# Patient Record
Sex: MALE | Race: BLACK OR AFRICAN AMERICAN | Employment: PART TIME | URBAN - METROPOLITAN AREA
[De-identification: names, ages, dates, MRNs, and addresses within clinical notes are randomized per-mention and may not be internally consistent; named-entity substitution may affect disease eponyms.]

---

## 2023-06-26 ENCOUNTER — OFFICE VISIT (OUTPATIENT)
Dept: FAMILY MEDICINE CLINIC | Age: 68
End: 2023-06-26
Payer: MEDICARE

## 2023-06-26 VITALS
DIASTOLIC BLOOD PRESSURE: 118 MMHG | TEMPERATURE: 97.6 F | WEIGHT: 183 LBS | SYSTOLIC BLOOD PRESSURE: 180 MMHG | HEIGHT: 70 IN | OXYGEN SATURATION: 97 % | BODY MASS INDEX: 26.2 KG/M2 | HEART RATE: 86 BPM

## 2023-06-26 DIAGNOSIS — R79.9 ABNORMAL FINDING OF BLOOD CHEMISTRY, UNSPECIFIED: ICD-10-CM

## 2023-06-26 DIAGNOSIS — N40.1 BENIGN PROSTATIC HYPERPLASIA WITH MIXED URINARY INCONTINENCE: Primary | ICD-10-CM

## 2023-06-26 DIAGNOSIS — Z12.11 COLON CANCER SCREENING: ICD-10-CM

## 2023-06-26 DIAGNOSIS — R60.0 BILATERAL LEG EDEMA: ICD-10-CM

## 2023-06-26 DIAGNOSIS — Z12.5 SCREENING PSA (PROSTATE SPECIFIC ANTIGEN): ICD-10-CM

## 2023-06-26 DIAGNOSIS — R03.0 ELEVATED BLOOD-PRESSURE READING, WITHOUT DIAGNOSIS OF HYPERTENSION: ICD-10-CM

## 2023-06-26 DIAGNOSIS — R91.1 LUNG NODULE SEEN ON IMAGING STUDY: Primary | ICD-10-CM

## 2023-06-26 DIAGNOSIS — D51.3 OTHER DIETARY VITAMIN B12 DEFICIENCY ANEMIA: Primary | ICD-10-CM

## 2023-06-26 DIAGNOSIS — D64.89 ANEMIA DUE TO OTHER CAUSE, NOT CLASSIFIED: ICD-10-CM

## 2023-06-26 DIAGNOSIS — R03.0 ELEVATED BLOOD-PRESSURE READING, WITHOUT DIAGNOSIS OF HYPERTENSION: Primary | ICD-10-CM

## 2023-06-26 DIAGNOSIS — N39.46 BENIGN PROSTATIC HYPERPLASIA WITH MIXED URINARY INCONTINENCE: Primary | ICD-10-CM

## 2023-06-26 DIAGNOSIS — N39.3 STRESS INCONTINENCE OF URINE: ICD-10-CM

## 2023-06-26 DIAGNOSIS — Z91.81 AT HIGH RISK FOR FALLS: ICD-10-CM

## 2023-06-26 LAB
ALBUMIN SERPL-MCNC: 4.4 G/DL (ref 3.5–4.6)
ALP SERPL-CCNC: 94 U/L (ref 35–104)
ALT SERPL-CCNC: 14 U/L (ref 0–41)
ANION GAP SERPL CALCULATED.3IONS-SCNC: 13 MEQ/L (ref 9–15)
AST SERPL-CCNC: 20 U/L (ref 0–40)
BACTERIA URNS QL MICRO: NEGATIVE /HPF
BASOPHILS # BLD: 0 K/UL (ref 0–0.2)
BASOPHILS NFR BLD: 0.3 %
BILIRUB SERPL-MCNC: 1 MG/DL (ref 0.2–0.7)
BILIRUB UR QL STRIP: NEGATIVE
BUN SERPL-MCNC: 18 MG/DL (ref 8–23)
CALCIUM SERPL-MCNC: 9.5 MG/DL (ref 8.5–9.9)
CHLORIDE SERPL-SCNC: 103 MEQ/L (ref 95–107)
CHOLEST SERPL-MCNC: 186 MG/DL (ref 0–199)
CLARITY UR: ABNORMAL
CO2 SERPL-SCNC: 27 MEQ/L (ref 20–31)
COLOR UR: YELLOW
CREAT SERPL-MCNC: 1.09 MG/DL (ref 0.7–1.2)
EOSINOPHIL # BLD: 0.1 K/UL (ref 0–0.7)
EOSINOPHIL NFR BLD: 1.3 %
EPI CELLS #/AREA URNS AUTO: NORMAL /HPF (ref 0–5)
ERYTHROCYTE [DISTWIDTH] IN BLOOD BY AUTOMATED COUNT: 13.1 % (ref 11.5–14.5)
GLOBULIN SER CALC-MCNC: 3.2 G/DL (ref 2.3–3.5)
GLUCOSE SERPL-MCNC: 90 MG/DL (ref 70–99)
GLUCOSE UR STRIP-MCNC: NEGATIVE MG/DL
HCT VFR BLD AUTO: 41.8 % (ref 42–52)
HDLC SERPL-MCNC: 64 MG/DL (ref 40–59)
HGB BLD-MCNC: 13.9 G/DL (ref 14–18)
HGB UR QL STRIP: NEGATIVE
HYALINE CASTS #/AREA URNS AUTO: NORMAL /HPF (ref 0–5)
KETONES UR STRIP-MCNC: NEGATIVE MG/DL
LDL CHOLESTEROL CALCULATED: 110 MG/DL (ref 0–129)
LEUKOCYTE ESTERASE UR QL STRIP: NEGATIVE
LYMPHOCYTES # BLD: 1 K/UL (ref 1–4.8)
LYMPHOCYTES NFR BLD: 19.4 %
MCH RBC QN AUTO: 31.5 PG (ref 27–31.3)
MCHC RBC AUTO-ENTMCNC: 33.2 % (ref 33–37)
MCV RBC AUTO: 94.6 FL (ref 79–92.2)
MONOCYTES # BLD: 0.4 K/UL (ref 0.2–0.8)
MONOCYTES NFR BLD: 7.4 %
NEUTROPHILS # BLD: 3.8 K/UL (ref 1.4–6.5)
NEUTS SEG NFR BLD: 71.6 %
NITRITE UR QL STRIP: NEGATIVE
PH UR STRIP: 8 [PH] (ref 5–9)
PLATELET # BLD AUTO: 198 K/UL (ref 130–400)
POTASSIUM SERPL-SCNC: 4.1 MEQ/L (ref 3.4–4.9)
PROT SERPL-MCNC: 7.6 G/DL (ref 6.3–8)
PROT UR STRIP-MCNC: 30 MG/DL
PSA SERPL-MCNC: 17.25 NG/ML (ref 0–4)
RBC # BLD AUTO: 4.41 M/UL (ref 4.7–6.1)
RBC #/AREA URNS AUTO: NORMAL /HPF (ref 0–5)
SODIUM SERPL-SCNC: 143 MEQ/L (ref 135–144)
SP GR UR STRIP: 1.02 (ref 1–1.03)
TRIGLYCERIDE, FASTING: 61 MG/DL (ref 0–150)
URINE REFLEX TO CULTURE: ABNORMAL
UROBILINOGEN UR STRIP-ACNC: 1 E.U./DL
WBC # BLD AUTO: 5.3 K/UL (ref 4.8–10.8)
WBC #/AREA URNS AUTO: NORMAL /HPF (ref 0–5)

## 2023-06-26 PROCEDURE — G8419 CALC BMI OUT NRM PARAM NOF/U: HCPCS | Performed by: PHYSICIAN ASSISTANT

## 2023-06-26 PROCEDURE — 1036F TOBACCO NON-USER: CPT | Performed by: PHYSICIAN ASSISTANT

## 2023-06-26 PROCEDURE — 3017F COLORECTAL CA SCREEN DOC REV: CPT | Performed by: PHYSICIAN ASSISTANT

## 2023-06-26 PROCEDURE — 1123F ACP DISCUSS/DSCN MKR DOCD: CPT | Performed by: PHYSICIAN ASSISTANT

## 2023-06-26 PROCEDURE — G8427 DOCREV CUR MEDS BY ELIG CLIN: HCPCS | Performed by: PHYSICIAN ASSISTANT

## 2023-06-26 PROCEDURE — 99203 OFFICE O/P NEW LOW 30 MIN: CPT | Performed by: PHYSICIAN ASSISTANT

## 2023-06-26 RX ORDER — FUROSEMIDE 20 MG/1
20 TABLET ORAL DAILY
Qty: 10 TABLET | Refills: 0 | Status: SHIPPED | OUTPATIENT
Start: 2023-06-26

## 2023-06-26 RX ORDER — POTASSIUM CHLORIDE 750 MG/1
10 TABLET, EXTENDED RELEASE ORAL DAILY
Qty: 10 TABLET | Refills: 0 | Status: SHIPPED | OUTPATIENT
Start: 2023-06-26

## 2023-06-26 SDOH — ECONOMIC STABILITY: FOOD INSECURITY: WITHIN THE PAST 12 MONTHS, THE FOOD YOU BOUGHT JUST DIDN'T LAST AND YOU DIDN'T HAVE MONEY TO GET MORE.: NEVER TRUE

## 2023-06-26 SDOH — ECONOMIC STABILITY: HOUSING INSECURITY
IN THE LAST 12 MONTHS, WAS THERE A TIME WHEN YOU DID NOT HAVE A STEADY PLACE TO SLEEP OR SLEPT IN A SHELTER (INCLUDING NOW)?: NO

## 2023-06-26 SDOH — ECONOMIC STABILITY: INCOME INSECURITY: HOW HARD IS IT FOR YOU TO PAY FOR THE VERY BASICS LIKE FOOD, HOUSING, MEDICAL CARE, AND HEATING?: NOT HARD AT ALL

## 2023-06-26 SDOH — ECONOMIC STABILITY: FOOD INSECURITY: WITHIN THE PAST 12 MONTHS, YOU WORRIED THAT YOUR FOOD WOULD RUN OUT BEFORE YOU GOT MONEY TO BUY MORE.: NEVER TRUE

## 2023-06-26 ASSESSMENT — PATIENT HEALTH QUESTIONNAIRE - PHQ9
2. FEELING DOWN, DEPRESSED OR HOPELESS: 0
SUM OF ALL RESPONSES TO PHQ QUESTIONS 1-9: 0
SUM OF ALL RESPONSES TO PHQ9 QUESTIONS 1 & 2: 0
SUM OF ALL RESPONSES TO PHQ QUESTIONS 1-9: 0
SUM OF ALL RESPONSES TO PHQ QUESTIONS 1-9: 0
1. LITTLE INTEREST OR PLEASURE IN DOING THINGS: 0
SUM OF ALL RESPONSES TO PHQ QUESTIONS 1-9: 0

## 2023-06-27 ENCOUNTER — PREP FOR PROCEDURE (OUTPATIENT)
Dept: GASTROENTEROLOGY | Age: 68
End: 2023-06-27

## 2023-06-27 PROBLEM — Z12.11 SPECIAL SCREENING FOR MALIGNANT NEOPLASMS, COLON: Status: ACTIVE | Noted: 2023-06-27

## 2023-06-28 DIAGNOSIS — R79.9 ABNORMAL FINDING OF BLOOD CHEMISTRY, UNSPECIFIED: ICD-10-CM

## 2023-06-28 DIAGNOSIS — D64.89 ANEMIA DUE TO OTHER CAUSE, NOT CLASSIFIED: ICD-10-CM

## 2023-06-29 DIAGNOSIS — R35.0 BENIGN PROSTATIC HYPERPLASIA WITH URINARY FREQUENCY: Primary | ICD-10-CM

## 2023-06-29 DIAGNOSIS — N40.1 BENIGN PROSTATIC HYPERPLASIA WITH URINARY FREQUENCY: Primary | ICD-10-CM

## 2023-06-29 LAB
FOLATE: 12.9 NG/ML
IRON SATURATION: 27 % (ref 20–55)
IRON: 85 UG/DL (ref 59–158)
TOTAL IRON BINDING CAPACITY: 319 UG/DL (ref 250–450)
UNSATURATED IRON BINDING CAPACITY: 234 UG/DL (ref 112–347)
VITAMIN B-12: 486 PG/ML (ref 232–1245)

## 2023-06-29 RX ORDER — TERAZOSIN 1 MG/1
1 CAPSULE ORAL NIGHTLY
Qty: 30 CAPSULE | Refills: 3 | Status: SHIPPED | OUTPATIENT
Start: 2023-06-29 | End: 2023-08-23 | Stop reason: SDUPTHER

## 2023-07-06 ENCOUNTER — HOSPITAL ENCOUNTER (OUTPATIENT)
Dept: CT IMAGING | Age: 68
Discharge: HOME | End: 2023-07-08
Payer: MEDICARE

## 2023-07-06 DIAGNOSIS — R91.1 LUNG NODULE SEEN ON IMAGING STUDY: ICD-10-CM

## 2023-07-06 PROCEDURE — 71250 CT THORAX DX C-: CPT

## 2023-07-19 ENCOUNTER — HOSPITAL ENCOUNTER (EMERGENCY)
Age: 68
Discharge: HOME | End: 2023-07-19
Attending: EMERGENCY MEDICINE
Payer: MEDICARE

## 2023-07-19 ENCOUNTER — OFFICE VISIT (OUTPATIENT)
Dept: FAMILY MEDICINE CLINIC | Age: 68
End: 2023-07-19
Payer: MEDICARE

## 2023-07-19 VITALS
SYSTOLIC BLOOD PRESSURE: 200 MMHG | WEIGHT: 177 LBS | HEIGHT: 70 IN | TEMPERATURE: 97.6 F | OXYGEN SATURATION: 96 % | DIASTOLIC BLOOD PRESSURE: 120 MMHG | BODY MASS INDEX: 25.34 KG/M2 | HEART RATE: 94 BPM

## 2023-07-19 VITALS
HEART RATE: 82 BPM | BODY MASS INDEX: 26.22 KG/M2 | TEMPERATURE: 98.4 F | OXYGEN SATURATION: 96 % | RESPIRATION RATE: 20 BRPM | HEIGHT: 69 IN | DIASTOLIC BLOOD PRESSURE: 96 MMHG | SYSTOLIC BLOOD PRESSURE: 170 MMHG | WEIGHT: 177 LBS

## 2023-07-19 DIAGNOSIS — I49.9 IRREGULAR HEART BEAT: ICD-10-CM

## 2023-07-19 DIAGNOSIS — R60.0 BILATERAL LEG EDEMA: ICD-10-CM

## 2023-07-19 DIAGNOSIS — I16.9 HYPERTENSIVE CRISIS, UNSPECIFIED: Primary | ICD-10-CM

## 2023-07-19 DIAGNOSIS — N39.42 BENIGN PROSTATIC HYPERPLASIA WITH URINARY INCONTINENCE WITHOUT SENSORY AWARENESS: ICD-10-CM

## 2023-07-19 DIAGNOSIS — R94.31 ABNORMAL EKG: ICD-10-CM

## 2023-07-19 DIAGNOSIS — N40.1 BENIGN PROSTATIC HYPERPLASIA WITH URINARY INCONTINENCE WITHOUT SENSORY AWARENESS: ICD-10-CM

## 2023-07-19 DIAGNOSIS — I10 PRIMARY HYPERTENSION: ICD-10-CM

## 2023-07-19 DIAGNOSIS — I16.0 HYPERTENSIVE URGENCY: Primary | ICD-10-CM

## 2023-07-19 DIAGNOSIS — Z00.00 WELCOME TO MEDICARE PREVENTIVE VISIT: Primary | ICD-10-CM

## 2023-07-19 LAB
ANION GAP SERPL CALCULATED.3IONS-SCNC: 10 MEQ/L (ref 9–15)
BASOPHILS # BLD: 0 K/UL (ref 0–0.2)
BASOPHILS NFR BLD: 0.3 %
BUN SERPL-MCNC: 20 MG/DL (ref 8–23)
CALCIUM SERPL-MCNC: 9.2 MG/DL (ref 8.5–9.9)
CHLORIDE SERPL-SCNC: 104 MEQ/L (ref 95–107)
CO2 SERPL-SCNC: 28 MEQ/L (ref 20–31)
CREAT SERPL-MCNC: 0.98 MG/DL (ref 0.7–1.2)
EOSINOPHIL # BLD: 0 K/UL (ref 0–0.7)
EOSINOPHIL NFR BLD: 0.5 %
ERYTHROCYTE [DISTWIDTH] IN BLOOD BY AUTOMATED COUNT: 13.3 % (ref 11.5–14.5)
GLUCOSE SERPL-MCNC: 90 MG/DL (ref 70–99)
HCT VFR BLD AUTO: 39.8 % (ref 42–52)
HGB BLD-MCNC: 13.3 G/DL (ref 14–18)
LYMPHOCYTES # BLD: 0.9 K/UL (ref 1–4.8)
LYMPHOCYTES NFR BLD: 15.5 %
MCH RBC QN AUTO: 31.8 PG (ref 27–31.3)
MCHC RBC AUTO-ENTMCNC: 33.5 % (ref 33–37)
MCV RBC AUTO: 95.1 FL (ref 79–92.2)
MONOCYTES # BLD: 0.5 K/UL (ref 0.2–0.8)
MONOCYTES NFR BLD: 7.8 %
NEUTROPHILS # BLD: 4.5 K/UL (ref 1.4–6.5)
NEUTS SEG NFR BLD: 75.9 %
PLATELET # BLD AUTO: 192 K/UL (ref 130–400)
POTASSIUM SERPL-SCNC: 3.8 MEQ/L (ref 3.4–4.9)
RBC # BLD AUTO: 4.19 M/UL (ref 4.7–6.1)
SODIUM SERPL-SCNC: 142 MEQ/L (ref 135–144)
WBC # BLD AUTO: 5.9 K/UL (ref 4.8–10.8)

## 2023-07-19 PROCEDURE — 3080F DIAST BP >= 90 MM HG: CPT | Performed by: PHYSICIAN ASSISTANT

## 2023-07-19 PROCEDURE — 3017F COLORECTAL CA SCREEN DOC REV: CPT | Performed by: PHYSICIAN ASSISTANT

## 2023-07-19 PROCEDURE — 1036F TOBACCO NON-USER: CPT | Performed by: PHYSICIAN ASSISTANT

## 2023-07-19 PROCEDURE — G0402 INITIAL PREVENTIVE EXAM: HCPCS | Performed by: PHYSICIAN ASSISTANT

## 2023-07-19 PROCEDURE — 3077F SYST BP >= 140 MM HG: CPT | Performed by: PHYSICIAN ASSISTANT

## 2023-07-19 PROCEDURE — 99215 OFFICE O/P EST HI 40 MIN: CPT | Performed by: PHYSICIAN ASSISTANT

## 2023-07-19 PROCEDURE — 1123F ACP DISCUSS/DSCN MKR DOCD: CPT | Performed by: PHYSICIAN ASSISTANT

## 2023-07-19 PROCEDURE — 36415 COLL VENOUS BLD VENIPUNCTURE: CPT

## 2023-07-19 PROCEDURE — G8427 DOCREV CUR MEDS BY ELIG CLIN: HCPCS | Performed by: PHYSICIAN ASSISTANT

## 2023-07-19 PROCEDURE — G8419 CALC BMI OUT NRM PARAM NOF/U: HCPCS | Performed by: PHYSICIAN ASSISTANT

## 2023-07-19 PROCEDURE — 80048 BASIC METABOLIC PNL TOTAL CA: CPT

## 2023-07-19 PROCEDURE — 85025 COMPLETE CBC W/AUTO DIFF WBC: CPT

## 2023-07-19 PROCEDURE — 6370000000 HC RX 637 (ALT 250 FOR IP): Performed by: EMERGENCY MEDICINE

## 2023-07-19 PROCEDURE — 93005 ELECTROCARDIOGRAM TRACING: CPT | Performed by: EMERGENCY MEDICINE

## 2023-07-19 PROCEDURE — 99284 EMERGENCY DEPT VISIT MOD MDM: CPT

## 2023-07-19 PROCEDURE — 93000 ELECTROCARDIOGRAM COMPLETE: CPT | Performed by: PHYSICIAN ASSISTANT

## 2023-07-19 RX ORDER — AMLODIPINE BESYLATE 10 MG/1
10 TABLET ORAL DAILY
Qty: 30 TABLET | Refills: 0 | Status: SHIPPED | OUTPATIENT
Start: 2023-07-19

## 2023-07-19 RX ORDER — TERAZOSIN 5 MG/1
5 CAPSULE ORAL NIGHTLY
Qty: 30 CAPSULE | Refills: 3 | Status: CANCELLED | OUTPATIENT
Start: 2023-07-19

## 2023-07-19 RX ORDER — AMLODIPINE BESYLATE 5 MG/1
10 TABLET ORAL ONCE
Status: COMPLETED | OUTPATIENT
Start: 2023-07-19 | End: 2023-07-19

## 2023-07-19 RX ORDER — CLONIDINE HYDROCHLORIDE 0.1 MG/1
0.1 TABLET ORAL ONCE
Status: COMPLETED | OUTPATIENT
Start: 2023-07-19 | End: 2023-07-19

## 2023-07-19 RX ADMIN — AMLODIPINE BESYLATE 10 MG: 5 TABLET ORAL at 12:49

## 2023-07-19 RX ADMIN — CLONIDINE HYDROCHLORIDE 0.1 MG: 0.1 TABLET ORAL at 12:49

## 2023-07-19 ASSESSMENT — ENCOUNTER SYMPTOMS
BACK PAIN: 0
ABDOMINAL PAIN: 0
VOMITING: 0
BLOOD IN STOOL: 0
CHEST TIGHTNESS: 0
DIARRHEA: 0
COUGH: 0
SORE THROAT: 0
SHORTNESS OF BREATH: 0

## 2023-07-19 ASSESSMENT — PATIENT HEALTH QUESTIONNAIRE - PHQ9
SUM OF ALL RESPONSES TO PHQ QUESTIONS 1-9: 0
SUM OF ALL RESPONSES TO PHQ QUESTIONS 1-9: 0
2. FEELING DOWN, DEPRESSED OR HOPELESS: 0
SUM OF ALL RESPONSES TO PHQ QUESTIONS 1-9: 0
SUM OF ALL RESPONSES TO PHQ QUESTIONS 1-9: 0
SUM OF ALL RESPONSES TO PHQ9 QUESTIONS 1 & 2: 0
1. LITTLE INTEREST OR PLEASURE IN DOING THINGS: 0

## 2023-07-19 ASSESSMENT — LIFESTYLE VARIABLES
HOW OFTEN DO YOU HAVE A DRINK CONTAINING ALCOHOL: MONTHLY OR LESS
HOW MANY STANDARD DRINKS CONTAINING ALCOHOL DO YOU HAVE ON A TYPICAL DAY: PATIENT DOES NOT DRINK
HOW MANY STANDARD DRINKS CONTAINING ALCOHOL DO YOU HAVE ON A TYPICAL DAY: 1 OR 2
HOW OFTEN DO YOU HAVE A DRINK CONTAINING ALCOHOL: NEVER

## 2023-07-19 ASSESSMENT — PAIN - FUNCTIONAL ASSESSMENT: PAIN_FUNCTIONAL_ASSESSMENT: NONE - DENIES PAIN

## 2023-07-19 NOTE — ED PROVIDER NOTES
[facility] ED  EMERGENCY DEPARTMENT ENCOUNTER      Pt Name: Julius Scott  MRN: [id number]  [address] [facility] [place] 1955  Date of evaluation: 7/19/2023  Provider: Sara Mckeon MD    CHIEF COMPLAINT       Chief Complaint   Patient presents with    Hypertension         HISTORY OF PRESENT ILLNESS   (Location/Symptom, Timing/Onset, Context/Setting, Quality, Duration, Modifying Factors, Severity)  Note limiting factors. Julius Scott is a 75 y.o. male who presents to the emergency department      Notes that he was seen by his primary care provider and told his blood pressure was high and he needed to come to the emergency department for further evaluation. The patient denies any strokelike symptoms or chest pain or difficulty breathing. He denies any signs of endorgan damage on review of systems. He notes that he has not been followed by a doctor regularly so is unsure of how long he is out of blood pressure but at least over the past few months he has noticed that is been high but does not been put on any medication for it. Nursing Notes were reviewed. REVIEW OF SYSTEMS    (2-9 systems for level 4, 10 or more for level 5)     Review of Systems   Constitutional:  Negative for chills and fever. HENT:  Negative for ear pain and sore throat. Respiratory:  Negative for cough, chest tightness and shortness of breath. Cardiovascular:  Negative for chest pain and leg swelling. Gastrointestinal:  Negative for abdominal pain, blood in stool, diarrhea and vomiting. Genitourinary:  Negative for flank pain. Musculoskeletal:  Negative for back pain. Neurological:  Negative for dizziness, syncope and numbness. Psychiatric/Behavioral:  Negative for self-injury and suicidal ideas. All other systems reviewed and are negative. Except as noted above the remainder of the review of systems was reviewed and negative. PAST MEDICAL HISTORY   History reviewed.  No pertinent past medical 82   Resp:       Temp:       TempSrc:       SpO2: 98% 99% (!) 57% 96%   Weight:       Height:               Medical Decision Making  There is no literature to support emergent lowering of the blood pressure with asymptomatic hypertension. I will however give him a dose of what I will prescribe for him Norvasc. I will also give him a dose of clonidine while I check his kidney function. He does have 1+ pitting edema bilaterally but denies any shortness of breath and I do not suspect it is any heart failure. He has no chest pain or shortness of breath to require a ischemic work-up. Amount and/or Complexity of Data Reviewed  Labs: ordered. ECG/medicine tests: ordered. Risk  Prescription drug management. REASSESSMENT      Significant proved. The patient remains asymptomatic. No indication for any further work-up at this time. Patient feels well and understands return precautions. We will follow-up closely with his primary care doctor. I will start him on Norvasc 10 mg daily. CONSULTS:  None    PROCEDURES:  Unless otherwise noted below, none     Procedures        FINAL IMPRESSION      1. Hypertensive urgency          DISPOSITION/PLAN   DISPOSITION Decision To Discharge 07/19/2023 01:34:56 PM      PATIENT REFERRED TO:  GABINO Briceño [id number]  [phone]            DISCHARGE MEDICATIONS:  New Prescriptions    AMLODIPINE (NORVASC) 10 MG TABLET    Take 1 tablet by mouth daily     Controlled Substances Monitoring:     No flowsheet data found.     (Please note that portions of this note were completed with a voice recognition program.  Efforts were made to edit the dictations but occasionally words are mis-transcribed.)    Marichuy Schuster MD (electronically signed)  Attending Emergency Physician           Marichuy Schuster MD  07/19/23 [id number]

## 2023-07-19 NOTE — PROGRESS NOTES
throughout  Abdominal:      General: Bowel sounds are normal. There is no distension. Palpations: Abdomen is soft. There is no mass. Tenderness: There is no abdominal tenderness. There is no guarding. Musculoskeletal:         General: Normal range of motion. Cervical back: Normal range of motion and neck supple. Right lower leg: Edema present. Left lower leg: Edema present. Comments: +3 pitting edema bilat le   Lymphadenopathy:      Cervical: No cervical adenopathy. Skin:     General: Skin is warm and dry. Coloration: Skin is not jaundiced or pale. Neurological:      Mental Status: He is alert and oriented to person, place, and time. Motor: No weakness. Coordination: Coordination normal.      Gait: Gait abnormal.   Psychiatric:         Mood and Affect: Mood normal.         Thought Content: Thought content normal.         Judgment: Judgment normal.            Assessment & Plan    Diagnosis Orders   1. Hypertensive crisis, unspecified      refer to ER for bp control - pt will be accompanied by his daughter -will fu after discharge      2. Primary hypertension        3. Irregular heart beat  EKG 12 lead      4. Bilateral leg edema        5. Benign prostatic hyperplasia with urinary incontinence without sensory awareness        6. Abnormal EKG      consider cardiology consult             Orders Placed This Encounter   Procedures    EKG 12 lead     Order Specific Question:   Reason for Exam?     Answer:   Irregular heart rate     No orders of the defined types were placed in this encounter. There are no discontinued medications. No follow-ups on file.   F/u after discharge  Tari Hampton PA-C

## 2023-07-19 NOTE — PROGRESS NOTES
Medicare Annual Wellness Visit    Doris Knapp is here for Medicare AW    Assessment & Plan     Recommendations for Preventive Services Due: see orders and patient instructions/AVS.  Recommended screening schedule for the next 5-10 years is provided to the patient in written form: see Patient Instructions/AVS.     No follow-ups on file. Subjective       Patient's complete Health Risk Assessment and screening values have been reviewed and are found in Flowsheets. The following problems were reviewed today and where indicated follow up appointments were made and/or referrals ordered. Positive Risk Factor Screenings with Interventions:    Fall Risk:        Interventions:    Patient declines any further evaluation or treatment    Cognitive: Interventions:  Patient advised to follow-up in this office for further evaluation and treatment           General HRA Questions:       Pain Interventions:  Patient advised to follow up in the office for further evaluation and treatment        Dentist Screen:       Intervention:  Advised to schedule with their dentist     Vision Screen:        No results found. Interventions:   Patient encouraged to make appointment with their eye specialist     ADL's:   Patient reports needing help with: Interventions:      Advanced Directives: Intervention:  has NO advanced directive - not interested in additional information                       Objective   There were no vitals filed for this visit. There is no height or weight on file to calculate BMI. No Known Allergies  Prior to Visit Medications    Medication Sig Taking?  Authorizing Provider   terazosin (HYTRIN) 1 MG capsule Take 1 capsule by mouth nightly  Tari Hampton PA-C   furosemide (LASIX) 20 MG tablet Take 1 tablet by mouth daily  Tari Hampton PA-C   potassium chloride (KLOR-CON M) 10 MEQ extended release tablet Take 1 tablet by mouth daily  Tari

## 2023-07-19 NOTE — ED TRIAGE NOTES
Pt was sent to er via PCP for HTN. Pt states that he has no complaints at this time. Per family pt has been having increased b/p readings over the last month.

## 2023-07-20 LAB
EKG ATRIAL RATE: 86 BPM
EKG P AXIS: 76 DEGREES
EKG P-R INTERVAL: 202 MS
EKG Q-T INTERVAL: 386 MS
EKG QRS DURATION: 92 MS
EKG QTC CALCULATION (BAZETT): 461 MS
EKG R AXIS: -21 DEGREES
EKG T AXIS: 62 DEGREES
EKG VENTRICULAR RATE: 86 BPM

## 2023-07-27 PROBLEM — Z12.11 SPECIAL SCREENING FOR MALIGNANT NEOPLASMS, COLON: Status: RESOLVED | Noted: 2023-06-27 | Resolved: 2023-07-27

## 2023-08-14 RX ORDER — SODIUM CHLORIDE 9 MG/ML
INJECTION, SOLUTION INTRAVENOUS CONTINUOUS
Status: CANCELLED | OUTPATIENT
Start: 2023-08-14

## 2023-08-14 RX ORDER — SODIUM CHLORIDE 9 MG/ML
INJECTION, SOLUTION INTRAVENOUS PRN
Status: CANCELLED | OUTPATIENT
Start: 2023-08-14

## 2023-08-14 RX ORDER — SODIUM CHLORIDE 0.9 % (FLUSH) 0.9 %
5-40 SYRINGE (ML) INJECTION EVERY 12 HOURS SCHEDULED
Status: CANCELLED | OUTPATIENT
Start: 2023-08-14

## 2023-08-21 DIAGNOSIS — R35.0 BENIGN PROSTATIC HYPERPLASIA WITH URINARY FREQUENCY: ICD-10-CM

## 2023-08-21 DIAGNOSIS — N40.1 BENIGN PROSTATIC HYPERPLASIA WITH URINARY FREQUENCY: ICD-10-CM

## 2023-08-21 NOTE — TELEPHONE ENCOUNTER
Patient requesting medication refill.  Please approve or deny this request.    Rx requested:  Requested Prescriptions     Pending Prescriptions Disp Refills    amLODIPine (NORVASC) 10 MG tablet 30 tablet 0     Sig: Take 1 tablet by mouth daily    terazosin (HYTRIN) 1 MG capsule 30 capsule 3     Sig: Take 1 capsule by mouth nightly    furosemide (LASIX) 20 MG tablet 10 tablet 0     Sig: Take 1 tablet by mouth daily    potassium chloride (KLOR-CON M) 10 MEQ extended release tablet 10 tablet 0     Sig: Take 1 tablet by mouth daily         Last Office Visit:   7/19/2023      Next Visit Date:  Future Appointments   Date Time Provider [address] [place] [address]   11/3/2023 10:30 AM DREA Ledbetter [address] [place] [address]

## 2023-08-23 RX ORDER — TERAZOSIN 1 MG/1
1 CAPSULE ORAL NIGHTLY
Qty: 30 CAPSULE | Refills: 5 | Status: SHIPPED | OUTPATIENT
Start: 2023-08-23 | End: 2023-10-16 | Stop reason: SDUPTHER

## 2023-08-23 RX ORDER — AMLODIPINE BESYLATE 10 MG/1
10 TABLET ORAL DAILY
Qty: 30 TABLET | Refills: 5 | Status: SHIPPED | OUTPATIENT
Start: 2023-08-23 | End: 2023-10-16 | Stop reason: SDUPTHER

## 2023-08-23 RX ORDER — FUROSEMIDE 20 MG/1
20 TABLET ORAL DAILY
Qty: 10 TABLET | Refills: 1 | Status: SHIPPED | OUTPATIENT
Start: 2023-08-23 | End: 2023-10-16 | Stop reason: SDUPTHER

## 2023-08-23 RX ORDER — POTASSIUM CHLORIDE 750 MG/1
10 TABLET, EXTENDED RELEASE ORAL DAILY
Qty: 10 TABLET | Refills: 1 | Status: SHIPPED | OUTPATIENT
Start: 2023-08-23 | End: 2023-10-16 | Stop reason: SDUPTHER

## 2023-10-16 ENCOUNTER — OFFICE VISIT (OUTPATIENT)
Dept: FAMILY MEDICINE CLINIC | Age: 68
End: 2023-10-16
Payer: MEDICARE

## 2023-10-16 VITALS
WEIGHT: 169 LBS | BODY MASS INDEX: 25.03 KG/M2 | OXYGEN SATURATION: 98 % | HEART RATE: 68 BPM | TEMPERATURE: 98 F | SYSTOLIC BLOOD PRESSURE: 176 MMHG | DIASTOLIC BLOOD PRESSURE: 92 MMHG | HEIGHT: 69 IN

## 2023-10-16 DIAGNOSIS — Z12.11 SCREENING FOR COLON CANCER: ICD-10-CM

## 2023-10-16 DIAGNOSIS — I10 PRIMARY HYPERTENSION: Primary | ICD-10-CM

## 2023-10-16 DIAGNOSIS — R35.0 BENIGN PROSTATIC HYPERPLASIA WITH URINARY FREQUENCY: ICD-10-CM

## 2023-10-16 DIAGNOSIS — N40.1 BENIGN PROSTATIC HYPERPLASIA WITH URINARY FREQUENCY: ICD-10-CM

## 2023-10-16 DIAGNOSIS — Z23 IMMUNIZATION DUE: ICD-10-CM

## 2023-10-16 DIAGNOSIS — S81.802A: ICD-10-CM

## 2023-10-16 PROCEDURE — 1036F TOBACCO NON-USER: CPT | Performed by: NURSE PRACTITIONER

## 2023-10-16 PROCEDURE — G8484 FLU IMMUNIZE NO ADMIN: HCPCS | Performed by: NURSE PRACTITIONER

## 2023-10-16 PROCEDURE — 3017F COLORECTAL CA SCREEN DOC REV: CPT | Performed by: NURSE PRACTITIONER

## 2023-10-16 PROCEDURE — 3077F SYST BP >= 140 MM HG: CPT | Performed by: NURSE PRACTITIONER

## 2023-10-16 PROCEDURE — G8427 DOCREV CUR MEDS BY ELIG CLIN: HCPCS | Performed by: NURSE PRACTITIONER

## 2023-10-16 PROCEDURE — G0008 ADMIN INFLUENZA VIRUS VAC: HCPCS | Performed by: NURSE PRACTITIONER

## 2023-10-16 PROCEDURE — 1123F ACP DISCUSS/DSCN MKR DOCD: CPT | Performed by: NURSE PRACTITIONER

## 2023-10-16 PROCEDURE — 3080F DIAST BP >= 90 MM HG: CPT | Performed by: NURSE PRACTITIONER

## 2023-10-16 PROCEDURE — G0009 ADMIN PNEUMOCOCCAL VACCINE: HCPCS | Performed by: NURSE PRACTITIONER

## 2023-10-16 PROCEDURE — 99214 OFFICE O/P EST MOD 30 MIN: CPT | Performed by: NURSE PRACTITIONER

## 2023-10-16 PROCEDURE — 90694 VACC AIIV4 NO PRSRV 0.5ML IM: CPT | Performed by: NURSE PRACTITIONER

## 2023-10-16 PROCEDURE — G8420 CALC BMI NORM PARAMETERS: HCPCS | Performed by: NURSE PRACTITIONER

## 2023-10-16 PROCEDURE — 90677 PCV20 VACCINE IM: CPT | Performed by: NURSE PRACTITIONER

## 2023-10-16 RX ORDER — POTASSIUM CHLORIDE 750 MG/1
10 TABLET, EXTENDED RELEASE ORAL DAILY
Qty: 30 TABLET | Refills: 2 | Status: SHIPPED | OUTPATIENT
Start: 2023-10-16

## 2023-10-16 RX ORDER — FUROSEMIDE 20 MG/1
20 TABLET ORAL DAILY
Qty: 30 TABLET | Refills: 2 | Status: SHIPPED | OUTPATIENT
Start: 2023-10-16

## 2023-10-16 RX ORDER — ADHESIVE BANDAGE 3/4"
1 BANDAGE TOPICAL DAILY
Qty: 1 EACH | Refills: 0 | Status: SHIPPED | OUTPATIENT
Start: 2023-10-16

## 2023-10-16 RX ORDER — TERAZOSIN 1 MG/1
1 CAPSULE ORAL NIGHTLY
Qty: 30 CAPSULE | Refills: 5 | Status: SHIPPED | OUTPATIENT
Start: 2023-10-16

## 2023-10-16 RX ORDER — AMLODIPINE BESYLATE 10 MG/1
10 TABLET ORAL DAILY
Qty: 30 TABLET | Refills: 2 | Status: SHIPPED | OUTPATIENT
Start: 2023-10-16

## 2023-10-16 ASSESSMENT — ENCOUNTER SYMPTOMS
SHORTNESS OF BREATH: 0
CHEST TIGHTNESS: 0

## 2023-10-16 NOTE — PROGRESS NOTES
Vaccine Information Sheet, \"Influenza - Inactivated\"  given to Huan Youssef, or parent/legal guardian of  Huan Youssef and verbalized understanding. Patient responses:    Have you ever had a reaction to a flu vaccine? No  Are you able to eat eggs without adverse effects? Yes  Do you have any current illness? No  Have you ever had Guillian [place] Syndrome? No    Flu vaccine given per order. Please see immunization tab.

## 2023-10-16 NOTE — PROGRESS NOTES
[address] [facility]  [address] [facility] [address]  Dept: [phone]  Dept Fax: [phone]: [phone]     YOHAN Mendoza (: 1955) is a 75 y.o. male, Established patient, here for evaluation of the following chief complaint(s):  Sore (On left leg. Denies any leg. Not seeping at the moment. Scabbing over.), Medication Check (Lasix and Potassium was sent in for 10 day supply ), and Hypertension (Does not check BP at home, does not have BP cup.)      PCP:  Tari Hampton PA-C      Skin Problem  This is a new problem. The affected locations include the left lower leg. Rash characteristics: scabing. Associated with: hot water from water heater. Pertinent negatives include no fatigue, fever or shortness of breath. Past treatments include nothing. Patient is here for follow up on hypertension. How often are you checking your blood pressure? -  What are your average readings?  -  Are you compliant with your medications? No-  has been out of medication  Do you have any of the following symptoms? Chest Pain? No  Palpitations? No  MICHAELS/SOB? No  Headache? No  Peripheral Edema? Yes- out of lasix  Light Headedness?  No    Last labs:  Lab Results   Component Value Date/Time     2023 12:44 PM    K 3.8 2023 12:44 PM     2023 12:44 PM    CO2 28 2023 12:44 PM    BUN 20 2023 12:44 PM    CREATININE 0.98 2023 12:44 PM    GLUCOSE 90 2023 12:44 PM    CALCIUM 9.2 2023 12:44 PM       No results found for: \"CHOL\"  No results found for: \"TRIG\"  Lab Results   Component Value Date    HDL 64 (H) 2023     Lab Results   Component Value Date    LDLCALC 110 2023     No results found for: \"LABVLDL\", \"VLDL\"  No results found for: \"CHOLHDLRATIO\"         Review of Systems   Constitutional:  Negative for activity change,

## 2023-10-30 ENCOUNTER — OFFICE VISIT (OUTPATIENT)
Dept: FAMILY MEDICINE CLINIC | Age: 68
End: 2023-10-30
Payer: MEDICARE

## 2023-10-30 VITALS
WEIGHT: 167.4 LBS | HEIGHT: 69 IN | SYSTOLIC BLOOD PRESSURE: 160 MMHG | OXYGEN SATURATION: 98 % | DIASTOLIC BLOOD PRESSURE: 82 MMHG | TEMPERATURE: 98 F | BODY MASS INDEX: 24.79 KG/M2 | HEART RATE: 98 BPM

## 2023-10-30 DIAGNOSIS — I1A.0 RESISTANT HYPERTENSION: Primary | ICD-10-CM

## 2023-10-30 DIAGNOSIS — S81.802A: ICD-10-CM

## 2023-10-30 PROCEDURE — 1123F ACP DISCUSS/DSCN MKR DOCD: CPT | Performed by: FAMILY MEDICINE

## 2023-10-30 PROCEDURE — 99214 OFFICE O/P EST MOD 30 MIN: CPT | Performed by: FAMILY MEDICINE

## 2023-10-30 PROCEDURE — 3079F DIAST BP 80-89 MM HG: CPT | Performed by: FAMILY MEDICINE

## 2023-10-30 PROCEDURE — 3077F SYST BP >= 140 MM HG: CPT | Performed by: FAMILY MEDICINE

## 2023-10-30 RX ORDER — LISINOPRIL AND HYDROCHLOROTHIAZIDE 12.5; 1 MG/1; MG/1
1 TABLET ORAL DAILY
Qty: 90 TABLET | Refills: 1 | Status: SHIPPED | OUTPATIENT
Start: 2023-10-30

## 2023-10-30 NOTE — PROGRESS NOTES
Concepción Knapp 1955 is a 75 y.o. male who presents today with:  Chief Complaint   Patient presents with    Hypertension     2 week follow up. He has not been monitoring his blood pressures at home. BP today in the office was 202/104. Denies chest pains, palpitations, SOB, dizziness, lightheadedness, headaches or edema. Reports he has not taken his medication yet today. Hypertension      I have reviewed HPI and agree with above. Review of Systems    History reviewed. No pertinent past medical history. History reviewed. No pertinent surgical history. Social History     Socioeconomic History    Marital status:      Spouse name: Not on file    Number of children: Not on file    Years of education: Not on file    Highest education level: Not on file   Occupational History    Not on file   Tobacco Use    Smoking status: Never     Passive exposure: Never    Smokeless tobacco: Never   Vaping Use    Vaping Use: Never used   Substance and Sexual Activity    Alcohol use: Yes     Alcohol/week: 5.0 standard drinks of alcohol     Types: 5 Cans of beer per week    Drug use: Never    Sexual activity: Not on file   Other Topics Concern    Not on file   Social History Narrative    Not on file     Social Determinants of Health     Financial Resource Strain: Low Risk  (6/26/2023)    Overall Financial Resource Strain (CARDIA)     Difficulty of Paying Living Expenses: Not hard at all   Food Insecurity: No Food Insecurity (6/26/2023)    Hunger Vital Sign     Worried About Running Out of Food in the Last Year: Never true     [address] in the Last Year: Never true   Transportation Needs: Unknown (6/26/2023)    PRAPARE - Transportation     Lack of Transportation (Medical): Not on file     Lack of Transportation (Non-Medical):  No   Physical Activity: Insufficiently Active (7/19/2023)    Exercise Vital Sign     Days of Exercise per Week: 1 day     Minutes of Exercise per Session: 10 min

## 2023-11-03 ENCOUNTER — OFFICE VISIT (OUTPATIENT)
Dept: UROLOGY | Age: 68
End: 2023-11-03

## 2023-11-03 VITALS
HEIGHT: 69 IN | WEIGHT: 167 LBS | BODY MASS INDEX: 24.73 KG/M2 | SYSTOLIC BLOOD PRESSURE: 138 MMHG | HEART RATE: 85 BPM | DIASTOLIC BLOOD PRESSURE: 82 MMHG

## 2023-11-03 DIAGNOSIS — R35.0 BENIGN PROSTATIC HYPERPLASIA WITH URINARY FREQUENCY: ICD-10-CM

## 2023-11-03 DIAGNOSIS — Z12.5 SCREENING PSA (PROSTATE SPECIFIC ANTIGEN): ICD-10-CM

## 2023-11-03 DIAGNOSIS — N40.1 BENIGN PROSTATIC HYPERPLASIA WITH URINARY FREQUENCY: ICD-10-CM

## 2023-11-03 DIAGNOSIS — R32 URINARY INCONTINENCE, UNSPECIFIED TYPE: Primary | ICD-10-CM

## 2023-11-03 LAB
BILIRUBIN, POC: NORMAL
BLOOD URINE, POC: NORMAL
CLARITY, POC: CLEAR
COLOR, POC: YELLOW
GLUCOSE URINE, POC: NORMAL
KETONES, POC: NORMAL
LEUKOCYTE EST, POC: NORMAL
NITRITE, POC: NORMAL
PH, POC: 7
PROTEIN, POC: NORMAL
SPECIFIC GRAVITY, POC: 1.02
UROBILINOGEN, POC: 1

## 2023-11-03 ASSESSMENT — ENCOUNTER SYMPTOMS: APNEA: 0

## 2023-11-03 NOTE — PROGRESS NOTES
Subjective:      Patient ID: Miriam Mcgovern is a 75 y.o. male    HPI 75year old male who presents with complaints of urinary frequency and urgency. He states that he get up at approximately 3-4 times. He denies gross hematuria and dysuria. The patient had a PSA level drawn on 6/26/23 which was 17.25. max flow 6 ml/s, average flow 3 ml/s, PVR of 38    History reviewed. No pertinent past medical history. History reviewed. No pertinent surgical history. Social History     Socioeconomic History    Marital status:      Spouse name: None    Number of children: None    Years of education: None    Highest education level: None   Tobacco Use    Smoking status: Never     Passive exposure: Never    Smokeless tobacco: Never   Vaping Use    Vaping Use: Never used   Substance and Sexual Activity    Alcohol use: Yes     Alcohol/week: 5.0 standard drinks of alcohol     Types: 5 Cans of beer per week    Drug use: Never     Social Determinants of Health     Financial Resource Strain: Low Risk  (6/26/2023)    Overall Financial Resource Strain (CARDIA)     Difficulty of Paying Living Expenses: Not hard at all   Food Insecurity: No Food Insecurity (6/26/2023)    Hunger Vital Sign     Worried About Running Out of Food in the Last Year: Never true     Ran Out of Food in the Last Year: Never true   Transportation Needs: Unknown (6/26/2023)    PRAPARE - Transportation     Lack of Transportation (Non-Medical): No   Physical Activity: Insufficiently Active (7/19/2023)    Exercise Vital Sign     Days of Exercise per Week: 1 day     Minutes of Exercise per Session: 10 min   Housing Stability: Unknown (6/26/2023)    Housing Stability Vital Sign     Unstable Housing in the Last Year: No     History reviewed. No pertinent family history.   Current Outpatient Medications   Medication Sig Dispense Refill    lisinopril-hydroCHLOROthiazide (PRINZIDE;ZESTORETIC) 10-12.5 MG per tablet Take 1 tablet by mouth daily 90 tablet 1    amLODIPine

## 2023-12-02 DIAGNOSIS — Z12.5 SCREENING PSA (PROSTATE SPECIFIC ANTIGEN): ICD-10-CM

## 2023-12-02 LAB — PSA SERPL-MCNC: 21.77 NG/ML (ref 0–4)

## 2023-12-12 ENCOUNTER — PREP FOR PROCEDURE (OUTPATIENT)
Dept: UROLOGY | Age: 68
End: 2023-12-12

## 2023-12-12 ENCOUNTER — OFFICE VISIT (OUTPATIENT)
Dept: UROLOGY | Age: 68
End: 2023-12-12
Payer: MEDICARE

## 2023-12-12 VITALS
SYSTOLIC BLOOD PRESSURE: 138 MMHG | DIASTOLIC BLOOD PRESSURE: 84 MMHG | HEIGHT: 69 IN | BODY MASS INDEX: 25.18 KG/M2 | HEART RATE: 93 BPM | WEIGHT: 170 LBS

## 2023-12-12 DIAGNOSIS — N13.8 BPH WITH OBSTRUCTION/LOWER URINARY TRACT SYMPTOMS: ICD-10-CM

## 2023-12-12 DIAGNOSIS — R32 URINARY INCONTINENCE, UNSPECIFIED TYPE: Primary | ICD-10-CM

## 2023-12-12 DIAGNOSIS — N40.1 BPH WITH OBSTRUCTION/LOWER URINARY TRACT SYMPTOMS: ICD-10-CM

## 2023-12-12 DIAGNOSIS — R97.20 ELEVATED PSA: ICD-10-CM

## 2023-12-12 LAB
BILIRUBIN, POC: ABNORMAL
BLOOD URINE, POC: ABNORMAL
CLARITY, POC: CLEAR
COLOR, POC: YELLOW
GLUCOSE URINE, POC: ABNORMAL
KETONES, POC: ABNORMAL
LEUKOCYTE EST, POC: ABNORMAL
NITRITE, POC: ABNORMAL
PH, POC: 6.5
PROTEIN, POC: ABNORMAL
SPECIFIC GRAVITY, POC: 1.02
UROBILINOGEN, POC: 1

## 2023-12-12 PROCEDURE — 1036F TOBACCO NON-USER: CPT | Performed by: UROLOGY

## 2023-12-12 PROCEDURE — 1123F ACP DISCUSS/DSCN MKR DOCD: CPT | Performed by: UROLOGY

## 2023-12-12 PROCEDURE — 99214 OFFICE O/P EST MOD 30 MIN: CPT | Performed by: UROLOGY

## 2023-12-12 PROCEDURE — 81003 URINALYSIS AUTO W/O SCOPE: CPT | Performed by: PHYSICIAN ASSISTANT

## 2023-12-12 PROCEDURE — G8427 DOCREV CUR MEDS BY ELIG CLIN: HCPCS | Performed by: UROLOGY

## 2023-12-12 PROCEDURE — 3075F SYST BP GE 130 - 139MM HG: CPT | Performed by: UROLOGY

## 2023-12-12 PROCEDURE — 3017F COLORECTAL CA SCREEN DOC REV: CPT | Performed by: UROLOGY

## 2023-12-12 PROCEDURE — G8419 CALC BMI OUT NRM PARAM NOF/U: HCPCS | Performed by: UROLOGY

## 2023-12-12 PROCEDURE — G8484 FLU IMMUNIZE NO ADMIN: HCPCS | Performed by: UROLOGY

## 2023-12-12 PROCEDURE — 3079F DIAST BP 80-89 MM HG: CPT | Performed by: UROLOGY

## 2023-12-12 PROCEDURE — 51798 US URINE CAPACITY MEASURE: CPT | Performed by: UROLOGY

## 2023-12-12 RX ORDER — SODIUM CHLORIDE 0.9 % (FLUSH) 0.9 %
5-40 SYRINGE (ML) INJECTION PRN
Status: CANCELLED | OUTPATIENT
Start: 2023-12-12

## 2023-12-12 RX ORDER — CIPROFLOXACIN 500 MG/1
500 TABLET, FILM COATED ORAL 2 TIMES DAILY
Qty: 4 TABLET | Refills: 0 | Status: SHIPPED | OUTPATIENT
Start: 2023-12-12

## 2023-12-12 RX ORDER — SODIUM CHLORIDE 9 MG/ML
INJECTION, SOLUTION INTRAVENOUS PRN
Status: CANCELLED | OUTPATIENT
Start: 2023-12-12

## 2023-12-12 RX ORDER — SODIUM CHLORIDE 0.9 % (FLUSH) 0.9 %
5-40 SYRINGE (ML) INJECTION EVERY 12 HOURS SCHEDULED
Status: CANCELLED | OUTPATIENT
Start: 2023-12-12

## 2023-12-12 RX ORDER — TAMSULOSIN HYDROCHLORIDE 0.4 MG/1
0.4 CAPSULE ORAL DAILY
Qty: 90 CAPSULE | Refills: 3 | Status: SHIPPED | OUTPATIENT
Start: 2023-12-12

## 2023-12-12 ASSESSMENT — ENCOUNTER SYMPTOMS
ABDOMINAL DISTENTION: 0
SHORTNESS OF BREATH: 0
ABDOMINAL PAIN: 0

## 2023-12-12 NOTE — PROGRESS NOTES
Subjective:      Patient ID: Heidi Gtz is a 75 y.o. male    HPI This is a 74 yo male with lower ext OA uses a cane, HTN and back for continued evaluation of frequency, urgency and UI and elevated PSA. SInce last seen by Destini Benito, he still has some urgency and frequency and wears a depends for about 1 yr. He is here with his daughter. He has no hematuria or dysuria or pain. He has no flank pain. He has a fair flow and some PVD. He is bothered by his voiding. I reviewed the interval PSA. He has no DM, COPD or CP. He zuniga snot smoke ciggs. His daughter reports many of his family have cancer. He has not taken any antibiotics in the last 6 months. No past medical history on file. No past surgical history on file. Social History     Socioeconomic History    Marital status:    Tobacco Use    Smoking status: Never     Passive exposure: Never    Smokeless tobacco: Never   Vaping Use    Vaping Use: Never used   Substance and Sexual Activity    Alcohol use: Yes     Alcohol/week: 5.0 standard drinks of alcohol     Types: 5 Cans of beer per week    Drug use: Never     Social Determinants of Health     Financial Resource Strain: Low Risk  (6/26/2023)    Overall Financial Resource Strain (CARDIA)     Difficulty of Paying Living Expenses: Not hard at all   Transportation Needs: Unknown (6/26/2023)    PRAPARE - Transportation     Lack of Transportation (Non-Medical): No   Physical Activity: Insufficiently Active (7/19/2023)    Exercise Vital Sign     Days of Exercise per Week: 1 day     Minutes of Exercise per Session: 10 min   Housing Stability: Unknown (6/26/2023)    Housing Stability Vital Sign     Unstable Housing in the Last Year: No     No family history on file.   Current Outpatient Medications   Medication Sig Dispense Refill    ciprofloxacin (CIPRO) 500 MG tablet Take 1 tablet by mouth 2 times daily Start day prior to planned procedure and resume day after procedure 4 tablet 0

## 2024-01-04 ENCOUNTER — HOSPITAL ENCOUNTER (OUTPATIENT)
Dept: PREADMISSION TESTING | Age: 69
Discharge: HOME | End: 2024-01-08
Payer: MEDICARE

## 2024-01-04 VITALS
RESPIRATION RATE: 14 BRPM | BODY MASS INDEX: 24.82 KG/M2 | TEMPERATURE: 98.8 F | DIASTOLIC BLOOD PRESSURE: 96 MMHG | HEART RATE: 97 BPM | HEIGHT: 69 IN | WEIGHT: 167.6 LBS | OXYGEN SATURATION: 98 % | SYSTOLIC BLOOD PRESSURE: 152 MMHG

## 2024-01-04 LAB
ERYTHROCYTE [DISTWIDTH] IN BLOOD BY AUTOMATED COUNT: 12 % (ref 11.5–14.5)
HCT VFR BLD AUTO: 40.4 % (ref 42–52)
HGB BLD-MCNC: 13.3 G/DL (ref 14–18)
MCH RBC QN AUTO: 31.3 PG (ref 27–31.3)
MCHC RBC AUTO-ENTMCNC: 32.9 % (ref 33–37)
MCV RBC AUTO: 95.1 FL (ref 79–92.2)
PLATELET # BLD AUTO: 166 K/UL (ref 130–400)
RBC # BLD AUTO: 4.25 M/UL (ref 4.7–6.1)
WBC # BLD AUTO: 6 K/UL (ref 4.8–10.8)

## 2024-01-04 PROCEDURE — 85027 COMPLETE CBC AUTOMATED: CPT

## 2024-01-04 ASSESSMENT — ENCOUNTER SYMPTOMS
SINUS PRESSURE: 0
TROUBLE SWALLOWING: 0
COUGH: 0
SINUS PAIN: 0
BACK PAIN: 0
SORE THROAT: 0
SHORTNESS OF BREATH: 0
VOMITING: 0
FACIAL SWELLING: 0
ABDOMINAL PAIN: 0
PHOTOPHOBIA: 0
EYE REDNESS: 0
CONSTIPATION: 0
DIARRHEA: 0
ABDOMINAL DISTENTION: 0
EYE ITCHING: 0
CHOKING: 0
EYE DISCHARGE: 0
CHEST TIGHTNESS: 0
APNEA: 0
WHEEZING: 0
RHINORRHEA: 0
EYE PAIN: 0
NAUSEA: 0

## 2024-01-04 NOTE — H&P
PRE ADMISSION TESTING    HISTORY AND PHYSICAL EXAM    PATIENT NAME:  Alfred Leblanc    YOB: 1955  MRN:  [id number]  SERVICE DATE:  1/4/2024     Primary Care Physician: Tari Hampton PA-C   Surgeon: Dr. Stephan Gould    SUBJECTIVE  CHIEF COMPLAINT:  Benign prostatic hyperplasia with urinary frequency     The patient is a 68 y.o. male with significant past medical history of resistant HTN who presents for a preoperative consultation at the request of surgeon, Dr. Stephan Gould, who plans on performing TRANSRECTAL ULTRASOUND GUIDED PROSTATE BIOPSY on 1/8/24 at [facility].     The current problem began in 6/2023 when he had an elevated PSA level.  He was referred to Dr. Gould for further management.  He saw DREA Sanchez on 11/3/23 for a consultation.  He reviewed his PSA level and ordered a repeat PSA and follow up in 1 month.  His PSA level patrick from 17.25 to 21.77 therefore it was recommended that he have a biopsy.  Patient reports some frequency and urgency but denies dysuria, pain or hematuria.  He has been wearing depends for approximately 1 year.      Patient recently diagnosed with resistant HTN after an ER visit for hypertension crisis and multiple follow up appointments.  He has had some BP medication changes to help with his BP.  His last several office visits report BP readings of 160-170/80-90.  His PCP gave him instructions on when to seek emergent care and to monitor his BP at home regularly.    Known Anesthesia Problems: PATIENT HAS NO PRIOR ANESTHESIA EXPOSURE  Patient Objection to Receiving Blood Products: No  Personal or FH of DVT/PE: No    Medical/Cardiac Clearance Needed: Not Required    ALLERGIES: Patient has no known allergies.    PAST MEDICAL HISTORY:    Past Medical History:   Diagnosis Date    Arthritis     Hypertension      PAST SURGICAL HISTORY:  History reviewed. No pertinent surgical history.  FAMILY HISTORY:    Family History   Problem Relation Age of

## 2024-01-09 ENCOUNTER — PREP FOR PROCEDURE (OUTPATIENT)
Dept: UROLOGY | Age: 69
End: 2024-01-09

## 2024-01-09 DIAGNOSIS — R35.0 URINARY FREQUENCY: ICD-10-CM

## 2024-01-09 RX ORDER — LEVOFLOXACIN 5 MG/ML
500 INJECTION, SOLUTION INTRAVENOUS
Status: CANCELLED | OUTPATIENT
Start: 2024-01-15 | End: 2024-01-15

## 2024-01-09 RX ORDER — SODIUM CHLORIDE 9 MG/ML
INJECTION, SOLUTION INTRAVENOUS PRN
Status: CANCELLED | OUTPATIENT
Start: 2024-01-15

## 2024-01-09 RX ORDER — SODIUM CHLORIDE 0.9 % (FLUSH) 0.9 %
5-40 SYRINGE (ML) INJECTION PRN
Status: CANCELLED | OUTPATIENT
Start: 2024-01-15

## 2024-01-09 RX ORDER — SODIUM CHLORIDE 0.9 % (FLUSH) 0.9 %
5-40 SYRINGE (ML) INJECTION EVERY 12 HOURS SCHEDULED
Status: CANCELLED | OUTPATIENT
Start: 2024-01-15

## 2024-01-14 ENCOUNTER — ANESTHESIA EVENT (OUTPATIENT)
Dept: OPERATING ROOM | Age: 69
End: 2024-01-14
Payer: MEDICARE

## 2024-01-14 NOTE — ANESTHESIA PRE PROCEDURE
Department of Anesthesiology  Preprocedure Note       Name:  Alfred Leblanc   Age:  68 y.o.  :  1955                                          MRN:  [id number]         Date:  1/15/2024      Surgeon: Surgeon(s):  Stephan Gould MD    Procedure: Procedure(s):  TRUS TRANSRECTAL ULTRASOUND PROSTATE BIOPSY / MAC / PAT DONE 24 / PT WILL TAKE CIPRO 500MG  THE DAY BEFORE PROCEDURE AND THE DAY AFTER THE PROCEDURE    Medications prior to admission:   Prior to Admission medications    Medication Sig Start Date End Date Taking? Authorizing Provider   ciprofloxacin (CIPRO) 500 MG tablet Take 1 tablet by mouth 2 times daily Start day prior to planned procedure and resume day after procedure 23   Stephan Gould MD   tamsulosin (FLOMAX) 0.4 MG capsule Take 1 capsule by mouth daily 23   Stephan Gould MD   lisinopril-hydroCHLOROthiazide (PRINZIDE;ZESTORETIC) 10-12.5 MG per tablet Take 1 tablet by mouth daily 10/30/23   Nadeem Kern,    amLODIPine (NORVASC) 10 MG tablet Take 1 tablet by mouth daily 10/16/23   Yen James APRN - CNP   potassium chloride (KLOR-CON M) 10 MEQ extended release tablet Take 1 tablet by mouth daily 10/16/23   Yen James APRN - CNP   terazosin (HYTRIN) 1 MG capsule Take 1 capsule by mouth nightly 10/16/23   Yen James APRN - CNP   Blood Pressure Monitoring (BLOOD PRESSURE CUFF) MISC 1 each by Does not apply route daily 10/16/23   Yen James APRN - CNP       Current medications:    Current Facility-Administered Medications   Medication Dose Route Frequency Provider Last Rate Last Admin   • sodium chloride flush 0.9 % injection 5-40 mL  5-40 mL IntraVENous 2 times per day Stephan Gould MD       • sodium chloride flush 0.9 % injection 5-40 mL  5-40 mL IntraVENous PRN Stephan Gould MD       • 0.9 % sodium chloride infusion   IntraVENous PRN Stephan Gould  mL/hr at 01/15/24 0734 New Bag at 01/15/24 0734   •

## 2024-01-15 ENCOUNTER — ANESTHESIA (OUTPATIENT)
Dept: OPERATING ROOM | Age: 69
End: 2024-01-15
Payer: MEDICARE

## 2024-01-15 ENCOUNTER — HOSPITAL ENCOUNTER (OUTPATIENT)
Age: 69
Setting detail: OUTPATIENT SURGERY
Discharge: HOME | End: 2024-01-15
Attending: UROLOGY | Admitting: UROLOGY
Payer: MEDICARE

## 2024-01-15 VITALS
RESPIRATION RATE: 16 BRPM | BODY MASS INDEX: 22.62 KG/M2 | TEMPERATURE: 98 F | HEIGHT: 72 IN | WEIGHT: 167 LBS | DIASTOLIC BLOOD PRESSURE: 98 MMHG | SYSTOLIC BLOOD PRESSURE: 162 MMHG | OXYGEN SATURATION: 99 % | HEART RATE: 90 BPM

## 2024-01-15 DIAGNOSIS — N40.1 BENIGN PROSTATIC HYPERPLASIA WITH URINARY FREQUENCY: ICD-10-CM

## 2024-01-15 DIAGNOSIS — R35.0 URINARY FREQUENCY: ICD-10-CM

## 2024-01-15 DIAGNOSIS — R35.0 BENIGN PROSTATIC HYPERPLASIA WITH URINARY FREQUENCY: ICD-10-CM

## 2024-01-15 PROBLEM — R97.20 ELEVATED PSA: Status: ACTIVE | Noted: 2024-01-15

## 2024-01-15 PROCEDURE — 3700000000 HC ANESTHESIA ATTENDED CARE: Performed by: UROLOGY

## 2024-01-15 PROCEDURE — 2709999900 HC NON-CHARGEABLE SUPPLY: Performed by: UROLOGY

## 2024-01-15 PROCEDURE — 7100000010 HC PHASE II RECOVERY - FIRST 15 MIN: Performed by: UROLOGY

## 2024-01-15 PROCEDURE — 88307 TISSUE EXAM BY PATHOLOGIST: CPT

## 2024-01-15 PROCEDURE — 7100000011 HC PHASE II RECOVERY - ADDTL 15 MIN: Performed by: UROLOGY

## 2024-01-15 PROCEDURE — 6360000002 HC RX W HCPCS: Performed by: NURSE ANESTHETIST, CERTIFIED REGISTERED

## 2024-01-15 PROCEDURE — 88342 IMHCHEM/IMCYTCHM 1ST ANTB: CPT

## 2024-01-15 PROCEDURE — 3700000001 HC ADD 15 MINUTES (ANESTHESIA): Performed by: UROLOGY

## 2024-01-15 PROCEDURE — 3600000012 HC SURGERY LEVEL 2 ADDTL 15MIN: Performed by: UROLOGY

## 2024-01-15 PROCEDURE — 2500000003 HC RX 250 WO HCPCS: Performed by: NURSE ANESTHETIST, CERTIFIED REGISTERED

## 2024-01-15 PROCEDURE — 2580000003 HC RX 258: Performed by: UROLOGY

## 2024-01-15 PROCEDURE — 2500000003 HC RX 250 WO HCPCS: Performed by: UROLOGY

## 2024-01-15 PROCEDURE — 6360000002 HC RX W HCPCS: Performed by: UROLOGY

## 2024-01-15 PROCEDURE — 3600000002 HC SURGERY LEVEL 2 BASE: Performed by: UROLOGY

## 2024-01-15 RX ORDER — MEPERIDINE HYDROCHLORIDE 25 MG/ML
12.5 INJECTION INTRAMUSCULAR; INTRAVENOUS; SUBCUTANEOUS
Status: DISCONTINUED | OUTPATIENT
Start: 2024-01-15 | End: 2024-01-15 | Stop reason: HOSPADM

## 2024-01-15 RX ORDER — SODIUM CHLORIDE 9 MG/ML
INJECTION, SOLUTION INTRAVENOUS PRN
Status: DISCONTINUED | OUTPATIENT
Start: 2024-01-15 | End: 2024-01-15 | Stop reason: HOSPADM

## 2024-01-15 RX ORDER — METOCLOPRAMIDE HYDROCHLORIDE 5 MG/ML
10 INJECTION INTRAMUSCULAR; INTRAVENOUS
Status: DISCONTINUED | OUTPATIENT
Start: 2024-01-15 | End: 2024-01-15 | Stop reason: HOSPADM

## 2024-01-15 RX ORDER — SODIUM CHLORIDE 0.9 % (FLUSH) 0.9 %
5-40 SYRINGE (ML) INJECTION EVERY 12 HOURS SCHEDULED
Status: DISCONTINUED | OUTPATIENT
Start: 2024-01-15 | End: 2024-01-15 | Stop reason: HOSPADM

## 2024-01-15 RX ORDER — LEVOFLOXACIN 5 MG/ML
500 INJECTION, SOLUTION INTRAVENOUS
Status: COMPLETED | OUTPATIENT
Start: 2024-01-15 | End: 2024-01-15

## 2024-01-15 RX ORDER — SODIUM CHLORIDE 0.9 % (FLUSH) 0.9 %
5-40 SYRINGE (ML) INJECTION PRN
Status: DISCONTINUED | OUTPATIENT
Start: 2024-01-15 | End: 2024-01-15 | Stop reason: HOSPADM

## 2024-01-15 RX ORDER — PROPOFOL 10 MG/ML
INJECTION, EMULSION INTRAVENOUS PRN
Status: DISCONTINUED | OUTPATIENT
Start: 2024-01-15 | End: 2024-01-15 | Stop reason: SDUPTHER

## 2024-01-15 RX ORDER — ONDANSETRON 2 MG/ML
4 INJECTION INTRAMUSCULAR; INTRAVENOUS
Status: DISCONTINUED | OUTPATIENT
Start: 2024-01-15 | End: 2024-01-15 | Stop reason: HOSPADM

## 2024-01-15 RX ORDER — DIPHENHYDRAMINE HYDROCHLORIDE 50 MG/ML
12.5 INJECTION INTRAMUSCULAR; INTRAVENOUS
Status: DISCONTINUED | OUTPATIENT
Start: 2024-01-15 | End: 2024-01-15 | Stop reason: HOSPADM

## 2024-01-15 RX ORDER — OXYCODONE HYDROCHLORIDE 5 MG/1
5 TABLET ORAL
Status: DISCONTINUED | OUTPATIENT
Start: 2024-01-15 | End: 2024-01-15 | Stop reason: HOSPADM

## 2024-01-15 RX ORDER — LIDOCAINE HYDROCHLORIDE 20 MG/ML
INJECTION, SOLUTION EPIDURAL; INFILTRATION; INTRACAUDAL; PERINEURAL PRN
Status: DISCONTINUED | OUTPATIENT
Start: 2024-01-15 | End: 2024-01-15 | Stop reason: ALTCHOICE

## 2024-01-15 RX ORDER — FENTANYL CITRATE 0.05 MG/ML
50 INJECTION, SOLUTION INTRAMUSCULAR; INTRAVENOUS EVERY 10 MIN PRN
Status: DISCONTINUED | OUTPATIENT
Start: 2024-01-15 | End: 2024-01-15 | Stop reason: HOSPADM

## 2024-01-15 RX ORDER — LIDOCAINE HYDROCHLORIDE 10 MG/ML
1 INJECTION, SOLUTION EPIDURAL; INFILTRATION; INTRACAUDAL; PERINEURAL
Status: DISCONTINUED | OUTPATIENT
Start: 2024-01-15 | End: 2024-01-15 | Stop reason: HOSPADM

## 2024-01-15 RX ORDER — LIDOCAINE HYDROCHLORIDE 10 MG/ML
INJECTION, SOLUTION EPIDURAL; INFILTRATION; INTRACAUDAL; PERINEURAL PRN
Status: DISCONTINUED | OUTPATIENT
Start: 2024-01-15 | End: 2024-01-15 | Stop reason: SDUPTHER

## 2024-01-15 RX ADMIN — PROPOFOL 50 MG: 10 INJECTION, EMULSION INTRAVENOUS at 08:13

## 2024-01-15 RX ADMIN — PROPOFOL 50 MG: 10 INJECTION, EMULSION INTRAVENOUS at 08:24

## 2024-01-15 RX ADMIN — PROPOFOL 100 MG: 10 INJECTION, EMULSION INTRAVENOUS at 08:09

## 2024-01-15 RX ADMIN — SODIUM CHLORIDE: 9 INJECTION, SOLUTION INTRAVENOUS at 07:34

## 2024-01-15 RX ADMIN — LEVOFLOXACIN 500 MG: 5 INJECTION, SOLUTION INTRAVENOUS at 08:06

## 2024-01-15 RX ADMIN — PROPOFOL 50 MG: 10 INJECTION, EMULSION INTRAVENOUS at 08:18

## 2024-01-15 RX ADMIN — LIDOCAINE HYDROCHLORIDE 25 MG: 10 INJECTION, SOLUTION EPIDURAL; INFILTRATION; INTRACAUDAL; PERINEURAL at 08:09

## 2024-01-15 RX ADMIN — PROPOFOL 50 MG: 10 INJECTION, EMULSION INTRAVENOUS at 08:11

## 2024-01-15 ASSESSMENT — PAIN SCALES - GENERAL: PAINLEVEL_OUTOF10: 0

## 2024-01-15 NOTE — OP NOTE
Seminal vesicles appeared unremarkable.  Prostatic borders appeared  smooth.  There was diffuse enlargement of the transition zone of the  prostate.  There are no hyper or hypoechoic areas identified in the  peripheral zones of the prostate.  The prostate volume measured 56.39 mL  for predicted PSA of 622 ng/mL.  At occlusion of ultrasound imaging, a  2% lidocaine periprostatic block was applied through a long spinal  needle, 10 mL were utilized.  After assuring adequate anesthesia, an  18-gauge spring-loaded biopsy gun was utilized to obtain biopsies from  the mid base and apex of the prostate.  Two biopsies from each area were  sent separately for pathologic diagnosis.  At occlusion of this, the  ultrasound probe was removed out of the rectal vault.  There was noted  to be no significant bleeding.  He was awoken from anesthesia and taken  to recovery room in stable condition.  He will follow up in my office  two weeks' time to review the pathologic diagnosis.        AJIT SALDANA MD    D: 01/15/2024 8:41:19       T: 01/15/2024 8:44:57     [id number]  Job#: [id number]     Doc#: [id number]    CC:

## 2024-01-15 NOTE — ANESTHESIA POSTPROCEDURE EVALUATION
Department of Anesthesiology  Postprocedure Note    Patient: Alfred Leblanc  MRN: [id number]  YOB: 1955  Date of evaluation: 1/15/2024    Procedure Summary       Date: 01/15/24 Room / Location: [address]    Anesthesia Start: 0806 Anesthesia Stop: 0831    Procedure: TRUS TRANSRECTAL ULTRASOUND PROSTATE BIOPSY Diagnosis:       Benign prostatic hyperplasia with urinary frequency      Urinary frequency      (Benign prostatic hyperplasia with urinary frequency [N40.1, R35.0])      (Urinary frequency [R35.0])    Surgeons: Stephan Gould MD Responsible Provider: Mikel Estevez DO    Anesthesia Type: MAC ASA Status: 1            Anesthesia Type: No value filed.    Chio Phase I: Chio Score: 10    Chio Phase II:      Anesthesia Post Evaluation    Patient location during evaluation: bedside  Patient participation: complete - patient participated  Level of consciousness: awake  Airway patency: patent  Nausea & Vomiting: no nausea and no vomiting  Cardiovascular status: blood pressure returned to baseline  Respiratory status: acceptable  Hydration status: euvolemic  Pain management: adequate        No notable events documented.

## 2024-01-15 NOTE — BRIEF OP NOTE
Brief Postoperative Note      Patient: Alfred Leblanc  YOB: 1955  MRN: [id number]    Date of Procedure: 1/15/2024    Pre-op Diagnosis: elevated PSA    Post-Op Diagnosis: Same       Procedure(s):  TRUS TRANSRECTAL ULTRASOUND PROSTATE BIOPSY / MAC / PAT DONE 1-4-24 / PT WILL TAKE CIPRO 500MG  THE DAY BEFORE PROCEDURE AND THE DAY AFTER THE PROCEDURE    Surgeon(s):  Stephan Gould MD    Anesthesia: Monitor Anesthesia Care/local    Estimated Blood Loss (mL): Minimal    Complications: None    Specimens:   ID Type Source Tests Collected by Time Destination   A : A. RT BASE PROSTATE Tissue Prostate SURGICAL PATHOLOGY Stephan Gould MD 1/15/2024 0721    B : B. RT LAT BASE PROSTATE Tissue Prostate SURGICAL PATHOLOGY Stephan Gould MD 1/15/2024 0721    C : C. RT MEDIAL PROSTATE Tissue Prostate SURGICAL PATHOLOGY Stephan Gould MD 1/15/2024 0721    D : D. RT LAT MID PROSTATE Tissue Prostate SURGICAL PATHOLOGY Stephan Gould MD 1/15/2024 0721    E : E. RT APEX PROSTATE Tissue Prostate SURGICAL PATHOLOGY Stephan Gould MD 1/15/2024 0721    F : F. RT APEX LAT PROSTATE Tissue Prostate SURGICAL PATHOLOGY Stephan Gould MD 1/15/2024 0721    G : G. LT BASE PROSTATE Tissue Prostate SURGICAL PATHOLOGY Stephan Gould MD 1/15/2024 0721    H : H. LT LAT BASE PROSTATE Tissue Prostate SURGICAL PATHOLOGY Stephan Gould MD 1/15/2024 0721    I : I. LT MEDIAL PROSTATE Tissue Prostate SURGICAL PATHOLOGY Stephan Gould MD 1/15/2024 0721    J : J. LT LAT MID PROSTATE Tissue Prostate SURGICAL PATHOLOGY Stephan Gould MD 1/15/2024 0721    K : K. LT APEX PROSTATE Tissue Prostate SURGICAL PATHOLOGY Stephan Gould MD 1/15/2024 0721    L : L. LT LAT APEX PROSTATE Tissue Prostate SURGICAL PATHOLOGY Stephan Gould MD 1/15/2024 0721        Implants: None      Drains: None    Findings: PV 56.9 cc and 12 biopsies obtained      Electronically signed by Stephan CHACON

## 2024-01-15 NOTE — DISCHARGE INSTRUCTIONS
DC to home per PACU protocol. Drink plenty of liquids and no heavy activity. No ASA, NSAIDs, Vits or herbals until F/U. F/U as scheduled to review pathology. Resume Cipro at home tomorrow as prescribe

## 2024-01-24 ENCOUNTER — OFFICE VISIT (OUTPATIENT)
Dept: UROLOGY | Age: 69
End: 2024-01-24
Payer: MEDICARE

## 2024-01-24 VITALS
WEIGHT: 167 LBS | OXYGEN SATURATION: 97 % | HEART RATE: 86 BPM | BODY MASS INDEX: 22.62 KG/M2 | SYSTOLIC BLOOD PRESSURE: 178 MMHG | HEIGHT: 72 IN | DIASTOLIC BLOOD PRESSURE: 98 MMHG

## 2024-01-24 DIAGNOSIS — C61 PROSTATE CANCER (HCC): Primary | ICD-10-CM

## 2024-01-24 PROCEDURE — 1036F TOBACCO NON-USER: CPT | Performed by: UROLOGY

## 2024-01-24 PROCEDURE — 3017F COLORECTAL CA SCREEN DOC REV: CPT | Performed by: UROLOGY

## 2024-01-24 PROCEDURE — 1123F ACP DISCUSS/DSCN MKR DOCD: CPT | Performed by: UROLOGY

## 2024-01-24 PROCEDURE — G8427 DOCREV CUR MEDS BY ELIG CLIN: HCPCS | Performed by: UROLOGY

## 2024-01-24 PROCEDURE — 3077F SYST BP >= 140 MM HG: CPT | Performed by: UROLOGY

## 2024-01-24 PROCEDURE — G8484 FLU IMMUNIZE NO ADMIN: HCPCS | Performed by: UROLOGY

## 2024-01-24 PROCEDURE — 99214 OFFICE O/P EST MOD 30 MIN: CPT | Performed by: UROLOGY

## 2024-01-24 PROCEDURE — G8420 CALC BMI NORM PARAMETERS: HCPCS | Performed by: UROLOGY

## 2024-01-24 PROCEDURE — 3080F DIAST BP >= 90 MM HG: CPT | Performed by: UROLOGY

## 2024-01-24 ASSESSMENT — ENCOUNTER SYMPTOMS: ABDOMINAL PAIN: 0

## 2024-01-24 NOTE — PROGRESS NOTES
Subjective:      Patient ID: Alfred Leblanc is a 68 y.o. male    HPI  This is a 68 yo male with lower ext OA uses a cane, HTN and back after prostate biopsy for elevated PSA. Since the procedure on 1/15/24, he has been doing well. He had some mild bleeding for 1-2 days and ha snow resolved. He feels the Flomax has helped his flow but is still getting incontinence. I reviewed the pathology results in detail with the patient and his daughter and there is 2/6 pos for [place] 6 adenocarcinoma of prostate on Rt and 3/6 positive for [place] 6 adenocarcinoma of prostate on Lt.       PV: 56.9 cc    Past Medical History:   Diagnosis Date    Arthritis     Hypertension      Past Surgical History:   Procedure Laterality Date    PROSTATE SURGERY N/A 1/15/2024    TRUS TRANSRECTAL ULTRASOUND PROSTATE BIOPSY performed by Stephan Gould MD at [facility] OR     Social History     Socioeconomic History    Marital status:    Tobacco Use    Smoking status: Never     Passive exposure: Never    Smokeless tobacco: Never   Vaping Use    Vaping Use: Never used   Substance and Sexual Activity    Alcohol use: Yes     Alcohol/week: 5.0 standard drinks of alcohol     Types: 5 Cans of beer per week    Drug use: Never     Social Determinants of Health     Financial Resource Strain: Low Risk  (6/26/2023)    Overall Financial Resource Strain (CARDIA)     Difficulty of Paying Living Expenses: Not hard at all   Transportation Needs: Unknown (6/26/2023)    PRAPARE - Transportation     Lack of Transportation (Non-Medical): No   Physical Activity: Insufficiently Active (7/19/2023)    Exercise Vital Sign     Days of Exercise per Week: 1 day     Minutes of Exercise per Session: 10 min   Housing Stability: Unknown (6/26/2023)    Housing Stability Vital Sign     Unstable Housing in the Last Year: No     Family History   Problem Relation Age of Onset    Unknown Mother     Cancer Father      Current Outpatient Medications   Medication Sig Dispense

## 2024-02-02 DIAGNOSIS — I10 PRIMARY HYPERTENSION: ICD-10-CM

## 2024-02-02 RX ORDER — POTASSIUM CHLORIDE 750 MG/1
10 TABLET, EXTENDED RELEASE ORAL DAILY
Qty: 30 TABLET | Refills: 3 | Status: SHIPPED | OUTPATIENT
Start: 2024-02-02

## 2024-02-02 NOTE — TELEPHONE ENCOUNTER
Future Appointments    Encounter Information   Provider Department Appt Notes   2/15/2024 [place] [facility] ROOM 2 [facility] CT Scan Epic.pt mook gil ofc  Epic.pt mook ram   2/15/2024 [place] NUC MED INJECTION ROOM 1 [facility] Nuclear Medicine [facility].pt mook ram   2/15/2024 [place] NUCLEAR MEDICINE ROOM 3 [facility] Nuclear Medicine [facility].pt mook ram   2/26/2024 Stephan Gould MD [facility] Urology CT and Bone Scan Results   4/16/2024 Tari Hampton PA-C [facility] 6 m

## 2024-02-15 ENCOUNTER — HOSPITAL ENCOUNTER (OUTPATIENT)
Dept: NUCLEAR MEDICINE | Age: 69
Discharge: HOME | End: 2024-02-17
Attending: UROLOGY
Payer: MEDICARE

## 2024-02-15 ENCOUNTER — HOSPITAL ENCOUNTER (OUTPATIENT)
Dept: CT IMAGING | Age: 69
Discharge: HOME | End: 2024-02-17
Attending: UROLOGY
Payer: MEDICARE

## 2024-02-15 DIAGNOSIS — C61 PROSTATE CANCER (HCC): ICD-10-CM

## 2024-02-15 LAB
PERFORMED ON: NORMAL
POC CREATININE: 1.1 MG/DL (ref 0.8–1.3)
POC SAMPLE TYPE: NORMAL

## 2024-02-15 PROCEDURE — A9503 TC99M MEDRONATE: HCPCS | Performed by: UROLOGY

## 2024-02-15 PROCEDURE — 6360000004 HC RX CONTRAST MEDICATION: Performed by: UROLOGY

## 2024-02-15 PROCEDURE — 74178 CT ABD&PLV WO CNTR FLWD CNTR: CPT | Performed by: UROLOGY

## 2024-02-15 PROCEDURE — 2580000003 HC RX 258: Performed by: UROLOGY

## 2024-02-15 PROCEDURE — 78306 BONE IMAGING WHOLE BODY: CPT | Performed by: UROLOGY

## 2024-02-15 PROCEDURE — 3430000000 HC RX DIAGNOSTIC RADIOPHARMACEUTICAL: Performed by: UROLOGY

## 2024-02-15 RX ORDER — SODIUM CHLORIDE 0.9 % (FLUSH) 0.9 %
10 SYRINGE (ML) INJECTION PRN
Status: COMPLETED | OUTPATIENT
Start: 2024-02-15 | End: 2024-02-15

## 2024-02-15 RX ORDER — TC 99M MEDRONATE 20 MG/10ML
25 INJECTION, POWDER, LYOPHILIZED, FOR SOLUTION INTRAVENOUS
Status: COMPLETED | OUTPATIENT
Start: 2024-02-15 | End: 2024-02-15

## 2024-02-15 RX ADMIN — TC 99M MEDRONATE 29.6 MILLICURIE: 20 INJECTION, POWDER, LYOPHILIZED, FOR SOLUTION INTRAVENOUS at 09:25

## 2024-02-15 RX ADMIN — IOPAMIDOL 75 ML: 755 INJECTION, SOLUTION INTRAVENOUS at 10:01

## 2024-02-15 RX ADMIN — Medication 10 ML: at 09:25

## 2024-02-26 ENCOUNTER — OFFICE VISIT (OUTPATIENT)
Dept: UROLOGY | Age: 69
End: 2024-02-26
Payer: MEDICARE

## 2024-02-26 VITALS
HEIGHT: 72 IN | WEIGHT: 170 LBS | BODY MASS INDEX: 23.03 KG/M2 | HEART RATE: 88 BPM | SYSTOLIC BLOOD PRESSURE: 162 MMHG | DIASTOLIC BLOOD PRESSURE: 94 MMHG

## 2024-02-26 DIAGNOSIS — N40.1 BPH WITH OBSTRUCTION/LOWER URINARY TRACT SYMPTOMS: ICD-10-CM

## 2024-02-26 DIAGNOSIS — C61 PROSTATE CANCER (HCC): Primary | ICD-10-CM

## 2024-02-26 DIAGNOSIS — N13.8 BPH WITH OBSTRUCTION/LOWER URINARY TRACT SYMPTOMS: ICD-10-CM

## 2024-02-26 PROCEDURE — 99214 OFFICE O/P EST MOD 30 MIN: CPT | Performed by: UROLOGY

## 2024-02-26 PROCEDURE — 3080F DIAST BP >= 90 MM HG: CPT | Performed by: UROLOGY

## 2024-02-26 PROCEDURE — 51741 ELECTRO-UROFLOWMETRY FIRST: CPT | Performed by: UROLOGY

## 2024-02-26 PROCEDURE — G8427 DOCREV CUR MEDS BY ELIG CLIN: HCPCS | Performed by: UROLOGY

## 2024-02-26 PROCEDURE — 3077F SYST BP >= 140 MM HG: CPT | Performed by: UROLOGY

## 2024-02-26 PROCEDURE — G8484 FLU IMMUNIZE NO ADMIN: HCPCS | Performed by: UROLOGY

## 2024-02-26 PROCEDURE — 51798 US URINE CAPACITY MEASURE: CPT | Performed by: UROLOGY

## 2024-02-26 PROCEDURE — G8420 CALC BMI NORM PARAMETERS: HCPCS | Performed by: UROLOGY

## 2024-02-26 PROCEDURE — 1123F ACP DISCUSS/DSCN MKR DOCD: CPT | Performed by: UROLOGY

## 2024-02-26 PROCEDURE — 1036F TOBACCO NON-USER: CPT | Performed by: UROLOGY

## 2024-02-26 PROCEDURE — 3017F COLORECTAL CA SCREEN DOC REV: CPT | Performed by: UROLOGY

## 2024-02-26 ASSESSMENT — ENCOUNTER SYMPTOMS: ABDOMINAL PAIN: 0

## 2024-02-26 NOTE — PROGRESS NOTES
be given to PET-CT imaging using PMSA in this patient.              Specimen Collected: 02/15/24 18:11 EST Last Resulted: 02/19/24 07:26 EST             CT Urogram [[id number]]  Status: Final result     PACS Images     Show images for CT Urogram  CT Urogram  Order: [id number]  Status: Final result       Visible to patient: No (not released)       Next appt: 04/16/2024 at 11:30 AM in Family Medicine (Tari Hampton PA-C)       Dx: Prostate cancer (HCC)    0 Result Notes  Details    Reading Physician Reading Date Result Priority   Chago Azevedo MD  [phone] 2/16/2024      Narrative & Impression  EXAMINATION:  CT UROGRAM 2/15/2024 9:57 am     TECHNIQUE:  CT of the abdomen and pelvis was performed with and without the  administration of intravenous contrast. Multiplanar reformatted images are  provided for review.  MIP urogram images were performed. Automated exposure  control, iterative reconstruction, and/or weight based adjustment of the  mA/kV was utilized to reduce the radiation dose to as low as reasonably  achievable.     Multiplanar sagittal, axial, coronal, and 3D reconstructions were created and  reviewed.     COMPARISON:  None     HISTORY:  ORDERING SYSTEM PROVIDED HISTORY: Prostate cancer (HCC)  TECHNOLOGIST PROVIDED HISTORY:  Additional Contrast?->None  STAT Creatinine as needed:->Yes  Reason for exam:->new diagnosis of [place] 6 prostate cancer, PSA 21  What reading provider will be dictating this exam?->CRC     FINDINGS:  Three phase study of the genitourinary system     1. Noncontrast Phase: No ureteral, renal, or bladder calculi are identified.  Multiple phleboliths are identified within the pelvis.     2. Parenchymal Nephrogram Phase: The kidneys are symmetric in size,.  There  is a mildly complex, nonenhancing posterior right renal cyst which measures  1.9 cm head 30 Hounsfield units on postcontrast imaging.     3. Pyelogram Phase (delayed): No obstructive uropathy or renal masses

## 2024-04-16 ENCOUNTER — OFFICE VISIT (OUTPATIENT)
Dept: FAMILY MEDICINE CLINIC | Age: 69
End: 2024-04-16
Payer: MEDICARE

## 2024-04-16 ENCOUNTER — OFFICE VISIT (OUTPATIENT)
Dept: UROLOGY | Facility: CLINIC | Age: 69
End: 2024-04-16
Payer: COMMERCIAL

## 2024-04-16 VITALS
BODY MASS INDEX: 23.78 KG/M2 | HEIGHT: 72 IN | WEIGHT: 175.6 LBS | HEART RATE: 90 BPM | OXYGEN SATURATION: 96 % | DIASTOLIC BLOOD PRESSURE: 86 MMHG | TEMPERATURE: 98.4 F | SYSTOLIC BLOOD PRESSURE: 152 MMHG

## 2024-04-16 VITALS — WEIGHT: 170 LBS | HEIGHT: 72 IN | BODY MASS INDEX: 23.03 KG/M2

## 2024-04-16 DIAGNOSIS — D64.9 ANEMIA, UNSPECIFIED TYPE: ICD-10-CM

## 2024-04-16 DIAGNOSIS — M16.0 PRIMARY OSTEOARTHRITIS OF BOTH HIPS: ICD-10-CM

## 2024-04-16 DIAGNOSIS — I10 PRIMARY HYPERTENSION: ICD-10-CM

## 2024-04-16 DIAGNOSIS — R26.81 GAIT INSTABILITY: ICD-10-CM

## 2024-04-16 DIAGNOSIS — C61 PROSTATE CANCER (HCC): Primary | ICD-10-CM

## 2024-04-16 DIAGNOSIS — K40.90 NON-RECURRENT UNILATERAL INGUINAL HERNIA WITHOUT OBSTRUCTION OR GANGRENE: ICD-10-CM

## 2024-04-16 LAB
ALBUMIN SERPL-MCNC: 4.3 G/DL (ref 3.5–4.6)
ALP SERPL-CCNC: 105 U/L (ref 35–104)
ALT SERPL-CCNC: 10 U/L (ref 0–41)
ANION GAP SERPL CALCULATED.3IONS-SCNC: 9 MEQ/L (ref 9–15)
AST SERPL-CCNC: 18 U/L (ref 0–40)
BASOPHILS # BLD: 0 K/UL (ref 0–0.2)
BASOPHILS NFR BLD: 0.3 %
BILIRUB SERPL-MCNC: 1 MG/DL (ref 0.2–0.7)
BUN SERPL-MCNC: 21 MG/DL (ref 8–23)
CALCIUM SERPL-MCNC: 9.6 MG/DL (ref 8.5–9.9)
CHLORIDE SERPL-SCNC: 106 MEQ/L (ref 95–107)
CHOLEST SERPL-MCNC: 189 MG/DL (ref 0–199)
CO2 SERPL-SCNC: 29 MEQ/L (ref 20–31)
CREAT SERPL-MCNC: 1.08 MG/DL (ref 0.7–1.2)
EOSINOPHIL # BLD: 0.1 K/UL (ref 0–0.7)
EOSINOPHIL NFR BLD: 1.4 %
ERYTHROCYTE [DISTWIDTH] IN BLOOD BY AUTOMATED COUNT: 12.2 % (ref 11.5–14.5)
GLOBULIN SER CALC-MCNC: 3.2 G/DL (ref 2.3–3.5)
GLUCOSE SERPL-MCNC: 89 MG/DL (ref 70–99)
HCT VFR BLD AUTO: 41.3 % (ref 42–52)
HDLC SERPL-MCNC: 72 MG/DL (ref 40–59)
HGB BLD-MCNC: 13.6 G/DL (ref 14–18)
LDL CHOLESTEROL CALCULATED: 105 MG/DL (ref 0–129)
LYMPHOCYTES # BLD: 1.2 K/UL (ref 1–4.8)
LYMPHOCYTES NFR BLD: 20.4 %
MCH RBC QN AUTO: 31.4 PG (ref 27–31.3)
MCHC RBC AUTO-ENTMCNC: 32.9 % (ref 33–37)
MCV RBC AUTO: 95.4 FL (ref 79–92.2)
MONOCYTES # BLD: 0.5 K/UL (ref 0.2–0.8)
MONOCYTES NFR BLD: 8.6 %
NEUTROPHILS # BLD: 4 K/UL (ref 1.4–6.5)
NEUTS SEG NFR BLD: 69.1 %
PLATELET # BLD AUTO: 224 K/UL (ref 130–400)
POTASSIUM SERPL-SCNC: 3.8 MEQ/L (ref 3.4–4.9)
PROT SERPL-MCNC: 7.5 G/DL (ref 6.3–8)
RBC # BLD AUTO: 4.33 M/UL (ref 4.7–6.1)
SODIUM SERPL-SCNC: 144 MEQ/L (ref 135–144)
TRIGLYCERIDE, FASTING: 58 MG/DL (ref 0–150)
WBC # BLD AUTO: 5.7 K/UL (ref 4.8–10.8)

## 2024-04-16 PROCEDURE — 3077F SYST BP >= 140 MM HG: CPT | Performed by: PHYSICIAN ASSISTANT

## 2024-04-16 PROCEDURE — 1036F TOBACCO NON-USER: CPT | Performed by: UROLOGY

## 2024-04-16 PROCEDURE — 99213 OFFICE O/P EST LOW 20 MIN: CPT | Performed by: PHYSICIAN ASSISTANT

## 2024-04-16 PROCEDURE — 99222 1ST HOSP IP/OBS MODERATE 55: CPT | Performed by: UROLOGY

## 2024-04-16 PROCEDURE — 1159F MED LIST DOCD IN RCRD: CPT | Performed by: UROLOGY

## 2024-04-16 PROCEDURE — 1123F ACP DISCUSS/DSCN MKR DOCD: CPT | Performed by: PHYSICIAN ASSISTANT

## 2024-04-16 PROCEDURE — 3079F DIAST BP 80-89 MM HG: CPT | Performed by: PHYSICIAN ASSISTANT

## 2024-04-16 RX ORDER — BLOOD PRESSURE TEST KIT-WRIST
KIT MISCELLANEOUS
Qty: 1 EACH | Refills: 0 | Status: SHIPPED | OUTPATIENT
Start: 2024-04-16

## 2024-04-16 RX ORDER — LOSARTAN POTASSIUM 100 MG/1
100 TABLET ORAL DAILY
Qty: 90 TABLET | Refills: 1 | Status: SHIPPED | OUTPATIENT
Start: 2024-04-16

## 2024-04-16 ASSESSMENT — PATIENT HEALTH QUESTIONNAIRE - PHQ9
SUM OF ALL RESPONSES TO PHQ QUESTIONS 1-9: 0
2. FEELING DOWN, DEPRESSED OR HOPELESS: NOT AT ALL
SUM OF ALL RESPONSES TO PHQ QUESTIONS 1-9: 0
SUM OF ALL RESPONSES TO PHQ QUESTIONS 1-9: 0
SUM OF ALL RESPONSES TO PHQ9 QUESTIONS 1 & 2: 0
SUM OF ALL RESPONSES TO PHQ QUESTIONS 1-9: 0
1. LITTLE INTEREST OR PLEASURE IN DOING THINGS: NOT AT ALL

## 2024-04-16 NOTE — PROGRESS NOTES
Subjective   Patient ID: Emanuel Lopez is a 68 y.o. male new patient kindly referred by Dr. Tremaine Whitley who presents for Elevated PSA.    HPI  The patient is accompanied by his daughter, Pao. Patient has elevated PSA and 2 inguinal hernias. Patient has had a biopsy and reviewed results with Dr. Whitley. He is interested in prostatectomy.   Patient relates that he sometimes has leakage but not all the time.     Patient's PMH includes HTN. Patient ambulates with a cane. He shuffles due to pain.   He denies prior abdominal surgeries.     Pathology Results  FINAL DIAGNOSIS:   Biopsy by Dr. Tremaine Whitley 1/15/24    A.  CORE BIOPSY RIGHT BASE PROSTATE:   PROSTATIC PARENCHYMA, NO EVIDENCE OF CARCINOMA   B.  CORE BIOPSY RIGHT LATERAL BASE PROSTATE:   PROSTATIC ADENOCARCINOMA, SHU SCORE 6 (3+3)   GRADE GROUP 1   ADENOCARCINOMA PRESENT IN 1 OF 1 TISSUE CORE   PERCENTAGE OF PROSTATE TISSUE INVOLVED BY CARCINOMA: LESS THAN 5%   C.  CORE BIOPSY RIGHT MEDIAL PROSTATE:   PROSTATIC PARENCHYMA, NO EVIDENCE OF CARCINOMA   D.  CORE BIOPSY RIGHT LATERAL MID PROSTATE:   PROSTATIC ADENOCARCINOMA, SHU SCORE 6 (3+3)   GRADE GROUP 1   ADENOCARCINOMA PRESENT IN 1 OF 1 TISSUE CORE   PERCENTAGE OF PROSTATE TISSUE INVOLVED BY CARCINOMA: 5%   E.  CORE BIOPSY RIGHT APEX PROSTATE:   PROSTATIC PARENCHYMA, NO EVIDENCE OF CARCINOMA   F.  CORE BIOPSY RIGHT APEX LATERAL PROSTATE:   HIGH-GRADE PROSTATIC INTRAEPITHELIAL NEOPLASIA   ATYPICAL GLANDS   G.  CORE BIOPSY LEFT BASE PROSTATE:   PROSTATIC ADENOCARCINOMA, SHU SCORE 6 (3+3)   GRADE GROUP 1   ADENOCARCINOMA PRESENT IN 1 OF 1 TISSUE CORE   PERCENTAGE OF PROSTATE TISSUE INVOLVED BY TUMOR: 10%   H.  CORE BIOPSY LEFT LATERAL BASE PROSTATE:   PROSTATIC PARENCHYMA, NO EVIDENCE OF CARCINOMA   I.  CORE BIOPSY LEFT MEDIAL PROSTATE:   PROSTATIC ADENOCARCINOMA, SHU SCORE 6 (3+3)   GRADE GROUP 1   ADENOCARCINOMA PRESENT IN 1 OF 1 TISSUE CORE   PERCENTAGE OF PROSTATE TISSUE INVOLVED BY  TUMOR: 50%     PSA Results  Component  Ref Range & Units 4 mo ago   PSA  0.00 - 4.00 ng/mL 21.77 High        Past Medical History  No past medical history on file.     Surgical History  No past surgical history on file.      Social History  He reports that he has never smoked. He has never used smokeless tobacco. He reports that he does not drink alcohol and does not use drugs.    Family History  No family history on file.    Medications  No current outpatient medications on file.     Allergies  Patient has no known allergies.     Review of Systems  A 12 system review was completed and is negative with the exception of those signs and symptoms noted in the history of present illness.    Objective   Physical Exam  General: in NAD, appears stated age  Head: normocephalic, atraumatic  Neck: supple; trachea is midline  Respiratory: normal effort, no use of accessory muscles  Cardiovascular: no peripheral edema  Abdomen: soft, nondistended, nontender, no rebound or guarding, no organomegaly, no CVA tenderness, no hernia  Lymphatic: no lymphadenopathy noted  Skin: normal turgor, no rashes  Neurologic: grossly intact, oriented to person/place/time  Psychiatric: mode and affect appropriate  : normal phallus, normal meatus, scrotum normal, testicles normal bilaterally, epididymis normal bilaterally. Very large right inguinal hernia.     Assessment/Plan   Problem List Items Addressed This Visit    None  Visit Diagnoses         Codes    Non-recurrent unilateral inguinal hernia without obstruction or gangrene     K40.90    Relevant Orders    Referral to General Surgery          Biopsy findings of prostatic adenocarcinoma with samples of [place] 6, GG 1, upto 50% of tissues samples were involved. Patient has discussed his treatment options with Dr. Whitley and has decided he would like to proceed with RALP.   We discussed the risks, benefits, and alternatives to the procedure. We also discussed postop expectations and recovery. I  explained that I do not perform hernia repairs as a surgeon and I do not like to combine the surgeries. It can lead to serious complications.     After PE, I think we should perform hernia surgery before the prostatectomy. I explained to the patient that his cancer is low-grade and the hernia has the potential to be emergent. I would like him to consult with Dr. Noe. Referral to Dr. Eleazar Noe. I will see the patient back after he has seen Dr. Noe to schedule RALP.     Meghan Attestation  By signing my name below, I, Meghan Rosas   attest that this documentation has been prepared under the direction and in the presence of Lyle Irby MD.

## 2024-04-16 NOTE — PROGRESS NOTES
Metabolic Panel    Lipid, Fasting    losartan (COZAAR) 100 MG tablet    Blood Pressure Monitoring (7 SERIES BP MONITOR/WRIST) EDIE    Stop lisinopril HCTZ and hytrin start losartan 100mg daily continue norvasc 10mg daily      3. Primary osteoarthritis of both hips  Handicap Placard MISC      4. Gait instability  Handicap Placard MISC      5. Anemia, unspecified type  CBC with Auto Differential            Orders Placed This Encounter   Procedures    Comprehensive Metabolic Panel     Standing Status:   Future     Number of Occurrences:   1     Standing Expiration Date:   4/16/2025    Lipid, Fasting     Standing Status:   Future     Number of Occurrences:   1     Standing Expiration Date:   4/16/2025    CBC with Auto Differential     Standing Status:   Future     Number of Occurrences:   1     Standing Expiration Date:   4/16/2025     Orders Placed This Encounter   Medications    losartan (COZAAR) 100 MG tablet     Sig: Take 1 tablet by mouth daily     Dispense:  90 tablet     Refill:  1    Handicap Placard MISC     Sig: by Does not apply route Duration 5 yrs     Dispense:  1 each     Refill:  0    Blood Pressure Monitoring (7 SERIES BP MONITOR/WRIST) EDIE     Sig: Dx hypertension     Dispense:  1 each     Refill:  0     Medications Discontinued During This Encounter   Medication Reason    lisinopril-hydroCHLOROthiazide (PRINZIDE;ZESTORETIC) 10-12.5 MG per tablet Alternate therapy    terazosin (HYTRIN) 1 MG capsule LIST CLEANUP     Return in about 2 months (around 6/16/2024).    Tari Hampton PA-C

## 2024-04-18 ENCOUNTER — OFFICE VISIT (OUTPATIENT)
Dept: SURGERY | Facility: CLINIC | Age: 69
End: 2024-04-18
Payer: COMMERCIAL

## 2024-04-18 VITALS
HEART RATE: 82 BPM | TEMPERATURE: 96.4 F | OXYGEN SATURATION: 98 % | RESPIRATION RATE: 16 BRPM | SYSTOLIC BLOOD PRESSURE: 160 MMHG | WEIGHT: 173.2 LBS | DIASTOLIC BLOOD PRESSURE: 99 MMHG | BODY MASS INDEX: 23.46 KG/M2 | HEIGHT: 72 IN

## 2024-04-18 DIAGNOSIS — K40.90 NON-RECURRENT UNILATERAL INGUINAL HERNIA WITHOUT OBSTRUCTION OR GANGRENE: ICD-10-CM

## 2024-04-18 DIAGNOSIS — K40.90 NON-RECURRENT UNILATERAL INGUINAL HERNIA WITHOUT OBSTRUCTION OR GANGRENE: Primary | ICD-10-CM

## 2024-04-18 PROCEDURE — 1159F MED LIST DOCD IN RCRD: CPT | Performed by: SURGERY

## 2024-04-18 PROCEDURE — 99204 OFFICE O/P NEW MOD 45 MIN: CPT | Performed by: SURGERY

## 2024-04-18 RX ORDER — LOSARTAN POTASSIUM 100 MG/1
1 TABLET ORAL DAILY
COMMUNITY
Start: 2024-04-16

## 2024-04-18 NOTE — PROGRESS NOTES
Emanuel Lopez   [id number]   1955         Chief Complaint/        Inguinal hernia          HPI/    68-year-old male seen for right scrotal hernia    Patient carries a recent diagnosis of prostate cancer.  Routine evaluation noted that he has a right scrotal hernia.  He also has a small hernia on the left side    Patient states he had the right-sided hernia for over 10 years.  Is gradually getting bigger.  Again it is all the way into the scrotum.  Patient says he can reduce some but not all of it.  Hernia is at least the size of the softball    Patient says he thinks he has a hernia on the left as well    He denies any intra-abdominal surgery    No past medical history on file.     Hypertension    Denies coronary disease, diabetes    Social History     Socioeconomic History    Marital status: Single     Spouse name: Not on file    Number of children: Not on file    Years of education: Not on file    Highest education level: Not on file   Occupational History    Not on file   Tobacco Use    Smoking status: Never    Smokeless tobacco: Never   Substance and Sexual Activity    Alcohol use: Never    Drug use: Never    Sexual activity: Not on file   Other Topics Concern    Not on file   Social History Narrative    Not on file     Social Determinants of Health     Financial Resource Strain: Low Risk  (6/26/2023)    Received from [facility]    Overall Financial Resource Strain (CARDIA)     Difficulty of Paying Living Expenses: Not hard at all   Food Insecurity: No Food Insecurity (6/26/2023)    Received from [facility]    Hunger Vital Sign     Worried About Running Out of Food in the Last Year: Never true     Ran Out of Food in the Last Year: Never true   Transportation Needs: Unknown (6/26/2023)    Received from [facility]    PRAPARE - Transportation     Lack of Transportation (Medical): Not on file     Lack of Transportation (Non-Medical): No  "  Physical Activity: Insufficiently Active (7/19/2023)    Received from [facility]    Exercise Vital Sign     Days of Exercise per Week: 1 day     Minutes of Exercise per Session: 10 min   Stress: Not on file   Social Connections: Not on file   Intimate Partner Violence: Not on file   Housing Stability: Unknown (6/26/2023)    Received from [facility]    Housing Stability Vital Sign     Unable to Pay for Housing in the Last Year: Not on file     Number of Places Lived in the Last Year: Not on file     Unstable Housing in the Last Year: No      Non-smoker    No family history on file.     Review of Systems   All other systems reviewed and are negative.         Current Outpatient Medications:     losartan (Cozaar) 100 mg tablet, Take 1 tablet (100 mg) by mouth once daily., Disp: , Rfl:        no      Xaralto  no      Eliquis  no      Coumadin  no      Plavix        No Known Allergies     No image results found.       I have reviewed the images and the reports      Laboratory data:   CBC:   No results found for: \"WBC\", \"RBC\", \"HGB\", \"HCT\", \"PLT\"]   [facility] Labs:   No results found for: \"GLUF\", \"NA\", \"K\", \"CL\", \"CO2\", \"BUN\", \"CREATININE\", \"CALCIUM\", \"PROT\", \"ALBUMIN\", \"BILITOT\", \"BILIDIR\", \"ALKPHOS\", \"AST\", \"ALT\"       I have reviewed the above laboratory studies      BP (!) 160/99 (BP Location: Right arm, Patient Position: Sitting, BP Cuff Size: Adult)   Pulse 82   Temp 35.8 °C (96.4 °F) (Temporal)   Resp 16   Ht 1.829 m (6')   Wt 78.6 kg (173 lb 3.2 oz)   SpO2 98%   BMI 23.49 kg/m²        Physical Exam  Constitutional:       Appearance: Normal appearance.   HENT:      Head: Normocephalic and atraumatic.   Cardiovascular:      Rate and Rhythm: Normal rate and regular rhythm.   Pulmonary:      Effort: Pulmonary effort is normal.      Breath sounds: Normal breath sounds.   Abdominal:      Comments: Right scrotal hernia, not reducible    Cannot appreciate testicle on the " right    Hernia on left noted   Musculoskeletal:         General: Normal range of motion.      Cervical back: Normal range of motion.   Skin:     General: Skin is warm.   Neurological:      General: No focal deficit present.      Mental Status: He is alert and oriented to person, place, and time.                  Assessment/    Right scrotal hernia    Left inguinal hernia    New diagnosis of prostate cancer    Baseline health issues to include hypertension            Plan/    Urology service request that the hernia be addressed before he undergo prostate cancer treatment.    Patient will need an open repair on the right side.    Extensive conversation carried out with the patient and his daughter.  Total of the hernia can be repaired.  Require mesh.  This to be an open repair under general anesthesia.    Given the very large size and the neglected state of the right-sided hernia, I have told the patient and family that the right testicle is at risk for loss.  Again the potential for cord damage leading to testicular atrophy or loss is reviewed.  The potential of performing a deliberate orchiectomy at the time of surgery is also mentioned.    With reference to the left side there is a hernia there as well.  If the right-sided repair goes well I will is repair of the left-sided hernia at the same setting.    Risk benefits indications for surgery reviewed with the patient and his family.  The potential bleeding infection MI PE death etc. reviewed.  Use of mesh and prosthetic materials reviewed.  The anticipated convalescence is reviewed.  Again the potential for right orchiectomy is reviewed.  All questions were answered and consent is obtained

## 2024-04-22 RX ORDER — ACETAMINOPHEN AND CODEINE PHOSPHATE 300; 30 MG/1; MG/1
1 TABLET ORAL EVERY 6 HOURS PRN
Qty: 15 TABLET | Refills: 0 | Status: SHIPPED | OUTPATIENT
Start: 2024-04-22 | End: 2024-05-13 | Stop reason: HOSPADM

## 2024-04-22 NOTE — H&P
Emanuel Lopez   [id number]   1955         Chief Complaint/          Right scrotal hernia        HPI/    68-year-old male presents for elective repair of a right scrotal hernia    Patient has a longstanding hernia on the right side.  He says had it for 10 to 15 years.  Is been getting bigger and bigger.  He has not been able to reduce it for a period of time.    Patient recently diagnosed with prostate cancer.  Preoperative evaluation noted the hernia.  Plan is to proceed with repair of the hernia prior to prostate cancer treatment    No past medical history on file.     Hypertension    Denies diabetes or coronary artery disease    New diagnosis of prostate cancer with no evident metastatic component    Social History     Socioeconomic History    Marital status: Single     Spouse name: Not on file    Number of children: Not on file    Years of education: Not on file    Highest education level: Not on file   Occupational History    Not on file   Tobacco Use    Smoking status: Never    Smokeless tobacco: Never   Substance and Sexual Activity    Alcohol use: Never    Drug use: Never    Sexual activity: Not on file   Other Topics Concern    Not on file   Social History Narrative    Not on file     Social Determinants of Health     Financial Resource Strain: Low Risk  (6/26/2023)    Received from [facility]    Overall Financial Resource Strain (CARDIA)     Difficulty of Paying Living Expenses: Not hard at all   Food Insecurity: No Food Insecurity (6/26/2023)    Received from [facility]    Hunger Vital Sign     Worried About Running Out of Food in the Last Year: Never true     Ran Out of Food in the Last Year: Never true   Transportation Needs: Unknown (6/26/2023)    Received from [facility]    PRAPARE - Transportation     Lack of Transportation (Medical): Not on file     Lack of Transportation (Non-Medical): No   Physical Activity: Insufficiently  "Active (7/19/2023)    Received from [facility] [email or web address] [facility]    Exercise Vital Sign     Days of Exercise per Week: 1 day     Minutes of Exercise per Session: 10 min   Stress: Not on file   Social Connections: Not on file   Intimate Partner Violence: Not on file   Housing Stability: Unknown (6/26/2023)    Received from [facility] [employer] [facility]    Housing Stability Vital Sign     Unable to Pay for Housing in the Last Year: Not on file     Number of Places Lived in the Last Year: Not on file     Unstable Housing in the Last Year: No      Non-smoker    No family history on file.     Review of Systems   All other systems reviewed and are negative.       No current facility-administered medications for this encounter.    Current Outpatient Medications:     losartan (Cozaar) 100 mg tablet, Take 1 tablet (100 mg) by mouth once daily., Disp: , Rfl:        no      Xaralto  no      Eliquis  no      Coumadin  no      Plavix        No Known Allergies     No image results found.       I have reviewed the images and the reports      Laboratory data:   CBC:   No results found for: \"WBC\", \"RBC\", \"HGB\", \"HCT\", \"PLT\"]   [facility] Labs:   No results found for: \"GLUF\", \"NA\", \"K\", \"CL\", \"CO2\", \"BUN\", \"CREATININE\", \"CALCIUM\", \"PROT\", \"ALBUMIN\", \"BILITOT\", \"BILIDIR\", \"ALKPHOS\", \"AST\", \"ALT\"       I have reviewed the above laboratory studies      There were no vitals taken for this visit.       Physical Exam  Constitutional:       Appearance: Normal appearance.   HENT:      Head: Normocephalic and atraumatic.   Cardiovascular:      Rate and Rhythm: Normal rate and regular rhythm.   Pulmonary:      Effort: Pulmonary effort is normal.      Breath sounds: Normal breath sounds.   Abdominal:      Comments: Right scrotal hernia, some of it can be reduced but the majority cannot    Cannot appreciate testicle on the right side    Hernia on left noted   Musculoskeletal:         General: Normal range of motion.      Cervical back: Normal " range of motion.   Skin:     General: Skin is warm.   Neurological:      General: No focal deficit present.      Mental Status: He is alert and oriented to person, place, and time.                  Assessment/    Right scrotal hernia    Left inguinal hernia    New diagnosis of prostate cancer    Baseline health issues to include hypertension        Plan/    Urology services requested that the large hernia be addressed before he undergo prostate cancer treatment    Will proceed to open repair on the right side.  If things go well the left-sided hernia will be addressed at the same setting    Given the very large size and the neglected state of the right-sided hernia I told the patient and his family the right testicle is at risk for loss.  Again the potential for cord damage leading to testicular atrophy or testicular loss is reviewed.  The potential of performing a deliberate and planned orchiectomy depending on the findings at the time of surgery is also reviewed.  Consent for orchiectomy is obtained.    Risk benefits indications for surgery reviewed with the patient.  The potential bleeding infection MI PE death etc. reviewed.  Use of mesh or prosthetic materials is reviewed.  The anticipated postoperative convalescence is reviewed.  The potential for right orchiectomy is reviewed.  All questions were answered and consent is obtained

## 2024-04-24 ASSESSMENT — ENCOUNTER SYMPTOMS
NEUROLOGICAL NEGATIVE: 1
CARDIOVASCULAR NEGATIVE: 1
EYES NEGATIVE: 1
RESPIRATORY NEGATIVE: 1
ENDOCRINE NEGATIVE: 1
CONSTITUTIONAL NEGATIVE: 1
NECK NEGATIVE: 1

## 2024-04-24 NOTE — CPM/PAT H&P
CPM/PAT Evaluation       Name: Emanuel Lopez)  /Age: 1955/68 y.o.     In-Person       Chief Complaint: scrotal hernia    HPI  This is a very pleasant 67 yo with Pmhx of BPH, HTN, prostate cancer, and right scrotal hernia.  Pt states the hernia has increased in size and is very uncomfortable.  He denies change in bowel habits.  No recent fever or chills.    Past Medical History:   Diagnosis Date    Anemia     BPH (benign prostatic hyperplasia)     Hypertension     Inguinal hernia of left side without obstruction or gangrene     Prostate cancer (Multi)        Past Surgical History:   Procedure Laterality Date    PROSTATE SURGERY      biopsy       Patient  has no history on file for sexual activity.    Family History   Problem Relation Name Age of Onset    Cancer Father         No Known Allergies    Prior to Admission medications    Medication Sig Start Date End Date Taking? Authorizing Provider   acetaminophen-codeine (Tylenol w/ Codeine #3) 300-30 mg tablet Take 1 tablet by mouth every 6 hours if needed for moderate pain (4 - 6). 24   Eleazar Noe MD   losartan (Cozaar) 100 mg tablet Take 1 tablet (100 mg) by mouth once daily. 24   Historical Provider, MD NOVA ROS:   Constitutional:   neg    Neuro/Psych:   neg    Eyes:   neg    Ears:   neg    Nose:   neg    Mouth:   neg    Throat:   neg    Neck:    No carotid bruit  neg    Cardio:   neg    Respiratory:   neg    Endocrine:   neg    GI:    See HPI  :    BPH going to have prostate surgery after recovers from hernia surgery  Musculoskeletal:    Pain in both hips   (Pt used to run in marathons)  Uses a cane to get around  Hematologic:   neg    Skin:  neg        Physical Exam  Constitutional:       Appearance: Normal appearance.   HENT:      Head: Normocephalic and atraumatic.      Nose: Nose normal.      Mouth/Throat:      Mouth: Mucous membranes are moist.   Eyes:      Pupils: Pupils are equal, round, and reactive to  light.   Neck:      Comments: No carotid bruit  Cardiovascular:      Rate and Rhythm: Normal rate and regular rhythm.   Pulmonary:      Effort: Pulmonary effort is normal.      Breath sounds: Normal breath sounds.   Abdominal:      General: Bowel sounds are normal.      Palpations: Abdomen is soft.   Musculoskeletal:      Comments: 1+ bilateral ankle edema   Skin:     General: Skin is warm and dry.   Neurological:      General: No focal deficit present.      Mental Status: He is alert and oriented to person, place, and time.   Psychiatric:         Mood and Affect: Mood normal.         Behavior: Behavior normal.      Airway:  neck full ROM  Anesthesia:  Patient denies any anesthesia complications.   Teeth:intact  ECG: NSR mod criteria for LVH    Visit Vitals  BP (!) 167/102   Pulse 74   Temp 36.4 °C (97.5 °F) (Temporal)   Resp 16   Contains abnormal data Comprehensive Metabolic Panel done 4/16 at [facility]  Order: [id number]  Component  Ref Range & Units 9 d ago   Sodium  135 - 144 mEq/L 144   Potassium  3.4 - 4.9 mEq/L 3.8   Chloride  95 - 107 mEq/L 106   CO2  20 - 31 mEq/L 29   Anion Gap  9 - 15 mEq/L 9   Glucose  70 - 99 mg/dL 89   BUN  8 - 23 mg/dL 21   Creatinine  0.70 - 1.20 mg/dL 1.08   Est, Glom Filt Rate  >60 74.1     Calcium  8.5 - 9.9 mg/dL 9.6   Total Protein  6.3 - 8.0 g/dL 7.5   Albumin  3.5 - 4.6 g/dL 4.3   Total Bilirubin  0.2 - 0.7 mg/dL 1.0 High    Alkaline Phosphatase  35 - 104 U/L 105 High    ALT  0 - 41 U/L 10   AST  0 - 40 U/L 18   Globulin  2.3 - 3.5 g/dL 3.2   Resulting Agency [facility]     Ref Range & Units 9 d ago   WBC  4.8 - 10.8 K/uL 5.7   RBC  4.70 - 6.10 M/uL 4.33 Low    Hemoglobin  14.0 - 18.0 g/dL 13.6 Low    Hematocrit  42.0 - 52.0 % 41.3 Low    MCV  79.0 - 92.2 fL 95.4 High    MCH  27.0 - 31.3 pg 31.4 High    MCHC  33.0 - 37.0 % 32.9 Low    RDW  11.5 - 14.5 % 12.2   Platelets  130 - 400 K/uL 224   Neutrophils %  % 69.1   Lymphocytes %  % 20.4   Monocytes %  % 8.6    Eosinophils %  % 1.4   Basophils %  % 0.3   Neutrophils Absolute  1.4 - 6.5 K/uL 4.0   Lymphocytes Absolute  1.0 - 4.8 K/uL 1.2   Monocytes Absolute  0.2 - 0.8 K/uL 0.5   Eosinophils Absolute  0.0 - 0.7 K/uL 0.1   Basophils Absolute  0.0 - 0.2 K/uL 0.0   Resulting Agency [facility]     Specimen Collected: 04/16/24 12:39    Performed by: [facility] Last Resulted: 04/16/24 14:54   Received From: [facility]  Result Received: 04/16/24 15:46     DASI Risk Score      Flowsheet Row Most Recent Value   DASI SCORE 16.2   METS Score (Will be calculated only when all the questions are answered) 4.7          Caprini DVT Assessment      Flowsheet Row Most Recent Value   DVT Score 4   Current Status Minor surgery planned   Age 60-75 years   BMI 30 or less          Modified Frailty Index      Flowsheet Row Most Recent Value   Modified Frailty Index Calculator .1818          CHADS2 Stroke Risk  Current as of 8 minutes ago        N/A 3 to 100%: High Risk   2 to < 3%: Medium Risk   0 to < 2%: Low Risk     Last Change: N/A          This score determines the patient's risk of having a stroke if the patient has atrial fibrillation.        This score is not applicable to this patient. Components are not calculated.          Revised Cardiac Risk Index    No data to display       Apfel Simplified Score    No data to display       Risk Analysis Index Results This Encounter         4/25/2024  0827             BRIGHT Cancer History: Patient indicates history of cancer    Total Risk Analysis Index Score Without Cancer: 24    Total Risk Analysis Index Score: 38          Stop Bang Score      Flowsheet Row Most Recent Value   Do you snore loudly? 0   Do you often feel tired or fatigued after your sleep? 0   Has anyone ever observed you stop breathing in your sleep? 0   Do you have or are you being treated for high blood pressure? 0   Recent BMI (Calculated) 23.5   Is BMI greater  than 35 kg/m2? 0=No   Age older than 50 years old? 1=Yes   Is your neck circumference greater than 17 inches (Male) or 16 inches (Female)? 0   Gender - Male 1=Yes   STOP-BANG Total Score 2          Assessment and Plan:   Plan for OR on 4/26 for   Procedure Laterality Anesthesia   Repair of right scrotal hernia, possible right orchiectomy, repair of left inguinal hernia [71414 (CPT®)]     Most recent labs enclosed  MRSA

## 2024-04-25 ENCOUNTER — PRE-ADMISSION TESTING (OUTPATIENT)
Dept: PREADMISSION TESTING | Facility: HOSPITAL | Age: 69
End: 2024-04-25
Payer: COMMERCIAL

## 2024-04-25 VITALS
DIASTOLIC BLOOD PRESSURE: 102 MMHG | BODY MASS INDEX: 23.77 KG/M2 | HEIGHT: 72 IN | WEIGHT: 175.49 LBS | HEART RATE: 74 BPM | SYSTOLIC BLOOD PRESSURE: 167 MMHG | OXYGEN SATURATION: 98 % | RESPIRATION RATE: 16 BRPM | TEMPERATURE: 97.5 F

## 2024-04-25 DIAGNOSIS — Z01.818 PRE-OP TESTING: Primary | ICD-10-CM

## 2024-04-25 DIAGNOSIS — K40.90 NON-RECURRENT UNILATERAL INGUINAL HERNIA WITHOUT OBSTRUCTION OR GANGRENE: ICD-10-CM

## 2024-04-25 PROCEDURE — 87081 CULTURE SCREEN ONLY: CPT | Mod: STJLAB

## 2024-04-25 PROCEDURE — 93005 ELECTROCARDIOGRAM TRACING: CPT

## 2024-04-25 PROCEDURE — 99203 OFFICE O/P NEW LOW 30 MIN: CPT | Performed by: NURSE PRACTITIONER

## 2024-04-25 RX ORDER — POTASSIUM CHLORIDE 750 MG/1
10 TABLET, FILM COATED, EXTENDED RELEASE ORAL DAILY
COMMUNITY

## 2024-04-25 RX ORDER — TAMSULOSIN HYDROCHLORIDE 0.4 MG/1
0.4 CAPSULE ORAL DAILY
COMMUNITY

## 2024-04-25 RX ORDER — CHLORHEXIDINE GLUCONATE ORAL RINSE 1.2 MG/ML
SOLUTION DENTAL
Qty: 473 ML | Refills: 0 | Status: SHIPPED | OUTPATIENT
Start: 2024-04-25 | End: 2024-05-13 | Stop reason: HOSPADM

## 2024-04-25 RX ORDER — AMLODIPINE BESYLATE 10 MG/1
10 TABLET ORAL DAILY
COMMUNITY

## 2024-04-25 ASSESSMENT — ENCOUNTER SYMPTOMS: ROS GI COMMENTS: SEE HPI

## 2024-04-25 ASSESSMENT — DUKE ACTIVITY SCORE INDEX (DASI)
CAN YOU WALK A BLOCK OR TWO ON LEVEL GROUND: NO
CAN YOU DO YARD WORK LIKE RAKING LEAVES, WEEDING OR PUSHING A MOWER: NO
CAN YOU RUN A SHORT DISTANCE: NO
DASI METS SCORE: 4.7
CAN YOU PARTICIPATE IN STRENOUS SPORTS LIKE SWIMMING, SINGLES TENNIS, FOOTBALL, BASKETBALL, OR SKIING: NO
CAN YOU CLIMB A FLIGHT OF STAIRS OR WALK UP A HILL: YES
CAN YOU HAVE SEXUAL RELATIONS: NO
CAN YOU WALK INDOORS, SUCH AS AROUND YOUR HOUSE: YES
CAN YOU DO HEAVY WORK AROUND THE HOUSE LIKE SCRUBBING FLOORS OR LIFTING AND MOVING HEAVY FURNITURE: NO
TOTAL_SCORE: 16.2
CAN YOU DO LIGHT WORK AROUND THE HOUSE LIKE DUSTING OR WASHING DISHES: YES
CAN YOU TAKE CARE OF YOURSELF (EAT, DRESS, BATHE, OR USE TOILET): YES
CAN YOU PARTICIPATE IN MODERATE RECREATIONAL ACTIVITIES LIKE GOLF, BOWLING, DANCING, DOUBLES TENNIS OR THROWING A BASEBALL OR FOOTBALL: NO
CAN YOU DO MODERATE WORK AROUND THE HOUSE LIKE VACUUMING, SWEEPING FLOORS OR CARRYING GROCERIES: YES

## 2024-04-25 ASSESSMENT — ACTIVITIES OF DAILY LIVING (ADL): ADL_SCORE: 1

## 2024-04-25 ASSESSMENT — LIFESTYLE VARIABLES: SMOKING_STATUS: NONSMOKER

## 2024-04-25 NOTE — PREPROCEDURE INSTRUCTIONS
Medication List            Accurate as of April 25, 2024  9:03 AM. Always use your most recent med list.                acetaminophen-codeine 300-30 mg tablet  Commonly known as: Tylenol w/ Codeine #3  Take 1 tablet by mouth every 6 hours if needed for moderate pain (4 - 6).  Medication Adjustments for Surgery: Take morning of surgery with sip of water, no other fluids     amLODIPine 10 mg tablet  Commonly known as: Norvasc  Medication Adjustments for Surgery: Take morning of surgery with sip of water, no other fluids     chlorhexidine 0.12 % solution  Commonly known as: Peridex  Sig: 15 mls swish and spit the night before surgery and 15mls swish and spit the morning of surgery do not swallow  Notes to patient: Take as prescribed     losartan 100 mg tablet  Commonly known as: Cozaar  Medication Adjustments for Surgery: Stop 1 day before surgery     potassium chloride CR 10 mEq ER tablet  Commonly known as: Klor-Con  Medication Adjustments for Surgery: Take morning of surgery with sip of water, no other fluids     tamsulosin 0.4 mg 24 hr capsule  Commonly known as: Flomax  Medication Adjustments for Surgery: Take morning of surgery with sip of water, no other fluids                     PRE-OPERATIVE INSTRUCTIONS    You will receive notification one business day prior to your procedure to confirm your arrival time. It is important that you answer your phone and/or check your messages during this time. If you do not hear from the surgery center by 5 pm. the day before your procedure, please call [phone].     Please enter the building through the Outpatient entrance and take the elevator off the lobby to the 2nd floor then check in at the Outpatient Surgery desk on the 2nd floor.    INSTRUCTIONS:  Talk to your surgeon for instructions if you should stop your aspirin, blood thinner, or diabetes medicines.  DO NOT take any multivitamins or over the counter supplements for 7-10 days before surgery.  If not being  admitted, you must have an adult immediately available to drive you home after surgery. We also highly recommend you have someone stay with you for the entire day and night of your surgery.  For children having surgery, a parent or legal guardian must accompany them to the surgery center. If this is not possible, please call [phone] to make additional arrangements.  For adults who are unable to consent or make medical decisions for themselves, a legal guardian or Power of  must accompany them to the surgery center. If this is not possible, please call [phone] to make additional arrangements.  Wear comfortable, loose fitting clothing.  All jewelry and piercings must be removed. If you are unable to remove an item or have a dermal piercing, please be sure to tell the nurse when you arrive for surgery.  Nail polish and make-up must be removed.  Avoid smoking or consuming alcohol for 24 hours before surgery.  To help prevent infection, please take a shower/bath and wash your hair the night before and/or morning of surgery (or follow other specific bathing instructions provided).    Preoperative Fasting Guidelines    Why must I stop eating and drinking near surgery time?  With sedation, food or liquid in your stomach can enter your lungs causing serious complications  Increases nausea and vomiting    When do I need to stop eating and drinking before my surgery?  Do not eat any solid food after midnight the night before your surgery/procedure unless otherwise instructed by your surgeon.   You may have up to 13.5 ounces of clear liquid until TWO hours before your instructed arrival time to the hospital.  This includes water, black tea/coffee, (no milk or cream) apple juice, and electrolyte drinks (Gatorade).   You may chew gum until TWO hours before your surgery/procedure      If applicable, notify your surgeons office immediately of any new skin changes that occur to the surgical limb.      If you have  any questions or concerns, please call Pre-Admission Testing at [phone].

## 2024-04-26 ENCOUNTER — ANESTHESIA (OUTPATIENT)
Dept: OPERATING ROOM | Facility: HOSPITAL | Age: 69
End: 2024-04-26
Payer: COMMERCIAL

## 2024-04-26 ENCOUNTER — HOSPITAL ENCOUNTER (OUTPATIENT)
Facility: HOSPITAL | Age: 69
Setting detail: OUTPATIENT SURGERY
Discharge: HOME | End: 2024-04-26
Attending: SURGERY | Admitting: SURGERY
Payer: COMMERCIAL

## 2024-04-26 ENCOUNTER — ANESTHESIA EVENT (OUTPATIENT)
Dept: OPERATING ROOM | Facility: HOSPITAL | Age: 69
End: 2024-04-26
Payer: COMMERCIAL

## 2024-04-26 ENCOUNTER — HOSPITAL ENCOUNTER (OUTPATIENT)
Facility: HOSPITAL | Age: 69
Setting detail: OUTPATIENT SURGERY
End: 2024-04-26
Attending: SURGERY | Admitting: SURGERY
Payer: COMMERCIAL

## 2024-04-26 VITALS
HEART RATE: 97 BPM | WEIGHT: 175 LBS | HEIGHT: 72 IN | OXYGEN SATURATION: 98 % | DIASTOLIC BLOOD PRESSURE: 91 MMHG | BODY MASS INDEX: 23.7 KG/M2 | RESPIRATION RATE: 18 BRPM | SYSTOLIC BLOOD PRESSURE: 169 MMHG | TEMPERATURE: 96.8 F

## 2024-04-26 DIAGNOSIS — K40.90 NON-RECURRENT UNILATERAL INGUINAL HERNIA WITHOUT OBSTRUCTION OR GANGRENE: Primary | ICD-10-CM

## 2024-04-26 DIAGNOSIS — K40.91 UNILATERAL RECURRENT INGUINAL HERNIA WITHOUT OBSTRUCTION OR GANGRENE: Primary | ICD-10-CM

## 2024-04-26 DIAGNOSIS — K40.90 UNILATERAL INGUINAL HERNIA WITHOUT OBSTRUCTION OR GANGRENE, RECURRENCE NOT SPECIFIED: ICD-10-CM

## 2024-04-26 PROCEDURE — 2500000004 HC RX 250 GENERAL PHARMACY W/ HCPCS (ALT 636 FOR OP/ED): Performed by: ANESTHESIOLOGIST ASSISTANT

## 2024-04-26 PROCEDURE — 2500000004 HC RX 250 GENERAL PHARMACY W/ HCPCS (ALT 636 FOR OP/ED): Mod: JZ | Performed by: SURGERY

## 2024-04-26 PROCEDURE — 3700000001 HC GENERAL ANESTHESIA TIME - INITIAL BASE CHARGE: Performed by: SURGERY

## 2024-04-26 PROCEDURE — 88300 SURGICAL PATH GROSS: CPT | Performed by: STUDENT IN AN ORGANIZED HEALTH CARE EDUCATION/TRAINING PROGRAM

## 2024-04-26 PROCEDURE — 96372 THER/PROPH/DIAG INJ SC/IM: CPT | Mod: 59 | Performed by: SURGERY

## 2024-04-26 PROCEDURE — 2500000005 HC RX 250 GENERAL PHARMACY W/O HCPCS: Performed by: ANESTHESIOLOGIST ASSISTANT

## 2024-04-26 PROCEDURE — 3600000003 HC OR TIME - INITIAL BASE CHARGE - PROCEDURE LEVEL THREE: Performed by: SURGERY

## 2024-04-26 PROCEDURE — 2780000003 HC OR 278 NO HCPCS: Performed by: SURGERY

## 2024-04-26 PROCEDURE — A49505 PR REPAIR ING HERNIA,5+Y/O,REDUCIBL: Performed by: ANESTHESIOLOGY

## 2024-04-26 PROCEDURE — 2500000004 HC RX 250 GENERAL PHARMACY W/ HCPCS (ALT 636 FOR OP/ED): Performed by: SURGERY

## 2024-04-26 PROCEDURE — 2500000004 HC RX 250 GENERAL PHARMACY W/ HCPCS (ALT 636 FOR OP/ED): Performed by: ANESTHESIOLOGY

## 2024-04-26 PROCEDURE — 7100000010 HC PHASE TWO TIME - EACH INCREMENTAL 1 MINUTE: Performed by: SURGERY

## 2024-04-26 PROCEDURE — 88300 SURGICAL PATH GROSS: CPT | Mod: TC,PARLAB,STJLAB | Performed by: SURGERY

## 2024-04-26 PROCEDURE — 2500000001 HC RX 250 WO HCPCS SELF ADMINISTERED DRUGS (ALT 637 FOR MEDICARE OP): Performed by: ANESTHESIOLOGY

## 2024-04-26 PROCEDURE — 7100000002 HC RECOVERY ROOM TIME - EACH INCREMENTAL 1 MINUTE: Performed by: SURGERY

## 2024-04-26 PROCEDURE — 49525 REPAIR ING HERNIA SLIDING: CPT | Performed by: SURGERY

## 2024-04-26 PROCEDURE — 3600000008 HC OR TIME - EACH INCREMENTAL 1 MINUTE - PROCEDURE LEVEL THREE: Performed by: SURGERY

## 2024-04-26 PROCEDURE — 87086 URINE CULTURE/COLONY COUNT: CPT | Mod: STJLAB | Performed by: SURGERY

## 2024-04-26 PROCEDURE — C1781 MESH (IMPLANTABLE): HCPCS | Performed by: SURGERY

## 2024-04-26 PROCEDURE — 2720000007 HC OR 272 NO HCPCS: Performed by: SURGERY

## 2024-04-26 PROCEDURE — 3700000002 HC GENERAL ANESTHESIA TIME - EACH INCREMENTAL 1 MINUTE: Performed by: SURGERY

## 2024-04-26 PROCEDURE — 7100000009 HC PHASE TWO TIME - INITIAL BASE CHARGE: Performed by: SURGERY

## 2024-04-26 PROCEDURE — A49505 PR REPAIR ING HERNIA,5+Y/O,REDUCIBL: Performed by: ANESTHESIOLOGIST ASSISTANT

## 2024-04-26 PROCEDURE — 7100000001 HC RECOVERY ROOM TIME - INITIAL BASE CHARGE: Performed by: SURGERY

## 2024-04-26 DEVICE — BARD MESH
Type: IMPLANTABLE DEVICE | Site: SCROTUM | Status: FUNCTIONAL
Brand: BARD MESH

## 2024-04-26 RX ORDER — HYDROMORPHONE HYDROCHLORIDE 1 MG/ML
1 INJECTION, SOLUTION INTRAMUSCULAR; INTRAVENOUS; SUBCUTANEOUS EVERY 5 MIN PRN
Status: DISCONTINUED | OUTPATIENT
Start: 2024-04-26 | End: 2024-04-26 | Stop reason: HOSPADM

## 2024-04-26 RX ORDER — HYDROCODONE BITARTRATE AND ACETAMINOPHEN 5; 325 MG/1; MG/1
1 TABLET ORAL EVERY 4 HOURS PRN
Status: DISCONTINUED | OUTPATIENT
Start: 2024-04-26 | End: 2024-04-26 | Stop reason: HOSPADM

## 2024-04-26 RX ORDER — LABETALOL HYDROCHLORIDE 5 MG/ML
INJECTION, SOLUTION INTRAVENOUS AS NEEDED
Status: DISCONTINUED | OUTPATIENT
Start: 2024-04-26 | End: 2024-04-26

## 2024-04-26 RX ORDER — SODIUM CHLORIDE, SODIUM LACTATE, POTASSIUM CHLORIDE, CALCIUM CHLORIDE 600; 310; 30; 20 MG/100ML; MG/100ML; MG/100ML; MG/100ML
100 INJECTION, SOLUTION INTRAVENOUS CONTINUOUS
Status: DISCONTINUED | OUTPATIENT
Start: 2024-04-26 | End: 2024-04-26 | Stop reason: HOSPADM

## 2024-04-26 RX ORDER — NITROGLYCERIN 20 MG/100ML
INJECTION INTRAVENOUS CONTINUOUS PRN
Status: DISCONTINUED | OUTPATIENT
Start: 2024-04-26 | End: 2024-04-26

## 2024-04-26 RX ORDER — LIDOCAINE HYDROCHLORIDE 20 MG/ML
INJECTION, SOLUTION EPIDURAL; INFILTRATION; INTRACAUDAL; PERINEURAL AS NEEDED
Status: DISCONTINUED | OUTPATIENT
Start: 2024-04-26 | End: 2024-04-26

## 2024-04-26 RX ORDER — NITROGLYCERIN 20 MG/100ML
INJECTION INTRAVENOUS AS NEEDED
Status: DISCONTINUED | OUTPATIENT
Start: 2024-04-26 | End: 2024-04-26

## 2024-04-26 RX ORDER — FENTANYL CITRATE 50 UG/ML
INJECTION, SOLUTION INTRAMUSCULAR; INTRAVENOUS AS NEEDED
Status: DISCONTINUED | OUTPATIENT
Start: 2024-04-26 | End: 2024-04-26

## 2024-04-26 RX ORDER — HYDRALAZINE HYDROCHLORIDE 20 MG/ML
5 INJECTION INTRAMUSCULAR; INTRAVENOUS EVERY 30 MIN PRN
Status: DISCONTINUED | OUTPATIENT
Start: 2024-04-26 | End: 2024-04-26 | Stop reason: HOSPADM

## 2024-04-26 RX ORDER — HYDROMORPHONE HYDROCHLORIDE 1 MG/ML
INJECTION, SOLUTION INTRAMUSCULAR; INTRAVENOUS; SUBCUTANEOUS AS NEEDED
Status: DISCONTINUED | OUTPATIENT
Start: 2024-04-26 | End: 2024-04-26

## 2024-04-26 RX ORDER — DIPHENHYDRAMINE HYDROCHLORIDE 50 MG/ML
12.5 INJECTION INTRAMUSCULAR; INTRAVENOUS ONCE AS NEEDED
Status: DISCONTINUED | OUTPATIENT
Start: 2024-04-26 | End: 2024-04-26 | Stop reason: HOSPADM

## 2024-04-26 RX ORDER — ROCURONIUM BROMIDE 50 MG/5 ML
SYRINGE (ML) INTRAVENOUS AS NEEDED
Status: DISCONTINUED | OUTPATIENT
Start: 2024-04-26 | End: 2024-04-26

## 2024-04-26 RX ORDER — PROPOFOL 10 MG/ML
INJECTION, EMULSION INTRAVENOUS AS NEEDED
Status: DISCONTINUED | OUTPATIENT
Start: 2024-04-26 | End: 2024-04-26

## 2024-04-26 RX ORDER — METOCLOPRAMIDE HYDROCHLORIDE 5 MG/ML
10 INJECTION INTRAMUSCULAR; INTRAVENOUS ONCE AS NEEDED
Status: DISCONTINUED | OUTPATIENT
Start: 2024-04-26 | End: 2024-04-26 | Stop reason: HOSPADM

## 2024-04-26 RX ORDER — MIDAZOLAM HYDROCHLORIDE 1 MG/ML
INJECTION, SOLUTION INTRAMUSCULAR; INTRAVENOUS AS NEEDED
Status: DISCONTINUED | OUTPATIENT
Start: 2024-04-26 | End: 2024-04-26

## 2024-04-26 RX ORDER — ALBUTEROL SULFATE 0.83 MG/ML
2.5 SOLUTION RESPIRATORY (INHALATION)
Status: DISCONTINUED | OUTPATIENT
Start: 2024-04-26 | End: 2024-04-26 | Stop reason: HOSPADM

## 2024-04-26 RX ORDER — SODIUM CHLORIDE, SODIUM LACTATE, POTASSIUM CHLORIDE, CALCIUM CHLORIDE 600; 310; 30; 20 MG/100ML; MG/100ML; MG/100ML; MG/100ML
INJECTION, SOLUTION INTRAVENOUS CONTINUOUS PRN
Status: DISCONTINUED | OUTPATIENT
Start: 2024-04-26 | End: 2024-04-26

## 2024-04-26 RX ORDER — HEPARIN SODIUM 5000 [USP'U]/ML
5000 INJECTION, SOLUTION INTRAVENOUS; SUBCUTANEOUS
Status: COMPLETED | OUTPATIENT
Start: 2024-04-26 | End: 2024-04-26

## 2024-04-26 RX ORDER — CEFAZOLIN SODIUM 2 G/100ML
2 INJECTION, SOLUTION INTRAVENOUS
Status: COMPLETED | OUTPATIENT
Start: 2024-04-26 | End: 2024-04-26

## 2024-04-26 RX ORDER — ONDANSETRON HYDROCHLORIDE 2 MG/ML
INJECTION, SOLUTION INTRAVENOUS AS NEEDED
Status: DISCONTINUED | OUTPATIENT
Start: 2024-04-26 | End: 2024-04-26

## 2024-04-26 RX ORDER — LABETALOL HYDROCHLORIDE 5 MG/ML
5 INJECTION, SOLUTION INTRAVENOUS
Status: DISCONTINUED | OUTPATIENT
Start: 2024-04-26 | End: 2024-04-26 | Stop reason: HOSPADM

## 2024-04-26 RX ADMIN — LIDOCAINE HYDROCHLORIDE 100 MG: 20 INJECTION, SOLUTION EPIDURAL; INFILTRATION; INTRACAUDAL; PERINEURAL at 13:51

## 2024-04-26 RX ADMIN — NITROGLYCERIN 200 MCG/MIN: 20 INJECTION INTRAVENOUS at 15:53

## 2024-04-26 RX ADMIN — Medication 10 MG: at 15:06

## 2024-04-26 RX ADMIN — SODIUM CHLORIDE, POTASSIUM CHLORIDE, SODIUM LACTATE AND CALCIUM CHLORIDE: 600; 310; 30; 20 INJECTION, SOLUTION INTRAVENOUS at 14:57

## 2024-04-26 RX ADMIN — PROPOFOL 20 MG: 10 INJECTION, EMULSION INTRAVENOUS at 15:05

## 2024-04-26 RX ADMIN — FENTANYL CITRATE 25 MCG: 50 INJECTION, SOLUTION INTRAMUSCULAR; INTRAVENOUS at 13:58

## 2024-04-26 RX ADMIN — SODIUM CHLORIDE, POTASSIUM CHLORIDE, SODIUM LACTATE AND CALCIUM CHLORIDE: 600; 310; 30; 20 INJECTION, SOLUTION INTRAVENOUS at 13:33

## 2024-04-26 RX ADMIN — Medication 50 MG: at 13:51

## 2024-04-26 RX ADMIN — FENTANYL CITRATE 25 MCG: 50 INJECTION, SOLUTION INTRAMUSCULAR; INTRAVENOUS at 13:55

## 2024-04-26 RX ADMIN — LABETALOL HYDROCHLORIDE 10 MG: 5 INJECTION, SOLUTION INTRAVENOUS at 15:05

## 2024-04-26 RX ADMIN — CEFAZOLIN SODIUM 2 G: 2 INJECTION, SOLUTION INTRAVENOUS at 14:01

## 2024-04-26 RX ADMIN — HYDROMORPHONE HYDROCHLORIDE 0.5 MG: 1 INJECTION, SOLUTION INTRAMUSCULAR; INTRAVENOUS; SUBCUTANEOUS at 14:22

## 2024-04-26 RX ADMIN — Medication 20 MG: at 14:13

## 2024-04-26 RX ADMIN — MIDAZOLAM 1 MG: 1 INJECTION INTRAMUSCULAR; INTRAVENOUS at 13:40

## 2024-04-26 RX ADMIN — HYDROCODONE BITARTRATE AND ACETAMINOPHEN 1 TABLET: 5; 325 TABLET ORAL at 18:13

## 2024-04-26 RX ADMIN — LABETALOL HYDROCHLORIDE 10 MG: 5 INJECTION, SOLUTION INTRAVENOUS at 15:50

## 2024-04-26 RX ADMIN — DEXAMETHASONE SODIUM PHOSPHATE 4 MG: 4 INJECTION, SOLUTION INTRAMUSCULAR; INTRAVENOUS at 14:04

## 2024-04-26 RX ADMIN — HYDROMORPHONE HYDROCHLORIDE 0.5 MG: 1 INJECTION, SOLUTION INTRAMUSCULAR; INTRAVENOUS; SUBCUTANEOUS at 14:13

## 2024-04-26 RX ADMIN — LABETALOL HYDROCHLORIDE 10 MG: 5 INJECTION, SOLUTION INTRAVENOUS at 15:01

## 2024-04-26 RX ADMIN — FENTANYL CITRATE 25 MCG: 50 INJECTION, SOLUTION INTRAMUSCULAR; INTRAVENOUS at 14:02

## 2024-04-26 RX ADMIN — ONDANSETRON 4 MG: 2 INJECTION INTRAMUSCULAR; INTRAVENOUS at 15:39

## 2024-04-26 RX ADMIN — HYDROMORPHONE HYDROCHLORIDE 0.5 MG: 1 INJECTION, SOLUTION INTRAMUSCULAR; INTRAVENOUS; SUBCUTANEOUS at 15:39

## 2024-04-26 RX ADMIN — SUGAMMADEX 200 MG: 100 INJECTION, SOLUTION INTRAVENOUS at 15:44

## 2024-04-26 RX ADMIN — HEPARIN SODIUM 5000 UNITS: 5000 INJECTION INTRAVENOUS; SUBCUTANEOUS at 13:22

## 2024-04-26 RX ADMIN — LABETALOL HYDROCHLORIDE 5 MG: 5 INJECTION, SOLUTION INTRAVENOUS at 16:20

## 2024-04-26 RX ADMIN — PROPOFOL 140 MG: 10 INJECTION, EMULSION INTRAVENOUS at 13:51

## 2024-04-26 RX ADMIN — HYDRALAZINE HYDROCHLORIDE 5 MG: 20 INJECTION INTRAMUSCULAR; INTRAVENOUS at 16:49

## 2024-04-26 RX ADMIN — FENTANYL CITRATE 25 MCG: 50 INJECTION, SOLUTION INTRAMUSCULAR; INTRAVENOUS at 13:51

## 2024-04-26 RX ADMIN — MIDAZOLAM 1 MG: 1 INJECTION INTRAMUSCULAR; INTRAVENOUS at 13:43

## 2024-04-26 RX ADMIN — Medication 20 MG: at 14:46

## 2024-04-26 SDOH — HEALTH STABILITY: MENTAL HEALTH: CURRENT SMOKER: 0

## 2024-04-26 ASSESSMENT — PAIN SCALES - GENERAL
PAINLEVEL_OUTOF10: 5 - MODERATE PAIN
PAINLEVEL_OUTOF10: 0 - NO PAIN
PAINLEVEL_OUTOF10: 3
PAINLEVEL_OUTOF10: 0 - NO PAIN
PAINLEVEL_OUTOF10: 2
PAINLEVEL_OUTOF10: 2
PAINLEVEL_OUTOF10: 3

## 2024-04-26 ASSESSMENT — PAIN - FUNCTIONAL ASSESSMENT
PAIN_FUNCTIONAL_ASSESSMENT: 0-10
PAIN_FUNCTIONAL_ASSESSMENT: WONG-BAKER FACES
PAIN_FUNCTIONAL_ASSESSMENT: 0-10

## 2024-04-26 ASSESSMENT — COLUMBIA-SUICIDE SEVERITY RATING SCALE - C-SSRS
6. HAVE YOU EVER DONE ANYTHING, STARTED TO DO ANYTHING, OR PREPARED TO DO ANYTHING TO END YOUR LIFE?: NO
1. IN THE PAST MONTH, HAVE YOU WISHED YOU WERE DEAD OR WISHED YOU COULD GO TO SLEEP AND NOT WAKE UP?: NO
2. HAVE YOU ACTUALLY HAD ANY THOUGHTS OF KILLING YOURSELF?: NO

## 2024-04-26 NOTE — OP NOTE
Repair of right scrotal hernia, possible right orchiectomy, repair of left inguinal hernia (B) Operative Note     Date: 2024  OR Location: [facility] OR    Name: Emanuel Lopez : 1955, Age: 68 y.o., MRN: [id number], Sex: male    Diagnosis  Pre-op Diagnosis     * Non-recurrent unilateral inguinal hernia without obstruction or gangrene [K40.90] Post-op Diagnosis     * Non-recurrent unilateral inguinal hernia without obstruction or gangrene [K40.90]     Procedures  Repair of right scrotal hernia, possible right orchiectomy, repair of left inguinal hernia  23145 - MO RPR 1ST INGUN HRNA AGE 5 YRS/> REDUCIBLE      Surgeons      * Eleazar Noe - Primary    Resident/Fellow/Other Assistant:  Surgeons and Role:  * No surgeons found with a matching role *    Procedure Summary  Anesthesia: General  ASA: II  Anesthesia Staff: Anesthesiologist: Alan Champagne MD; Rohan Hyatt MD  C-AA: ASHLIE Jimenez  Estimated Blood Loss: less than 50mL  Intra-op Medications:   Administrations occurring from 1325 to 1520 on 24:   Medication Name Total Dose   ceFAZolin in dextrose (iso-os) (Ancef) IVPB 2 g 2 g              Anesthesia Record               Intraprocedure I/O Totals          Intake    LR infusion 1000.00 mL    Total Intake 1000 mL       Output    Urine 300 mL    Est. Blood Loss 50 mL    Total Output 350 mL       Net    Net Volume 650 mL          Specimen:   ID Type Source Tests Collected by Time   1 : HERNIA SAC AND HERNIA TISSUE Tissue HERNIA SAC SURGICAL PATHOLOGY EXAM Eleazar Noe MD 2024 1454   A : URINE CULTURE, MERCADO PROTOCOL Fluid URINE CATHETERIZED URINE CULTURE Elaezar Noe MD 2024 1402        Staff:   Circulator: Arturo Kapoor RN  Relief Circulator: Jackie Driscoll RN; Chen Hermosillo RN  Relief Scrub: Saba Pollard  Scrub Person: Ashlie Wheeler         Drains and/or Catheters: * None in log *    Tourniquet Times:         Implants:  Implants       Type Name Action Serial No.       Surgical Mesh Sling Implant PATCH, MESH, MARLEX, 3 X 6 IN, POLYPROPYLENE - [id number] Implanted              68-year-old male with a right scrotal hernia and a small left inguinal hernia presents now for elective repair.    Risk benefits indications for this reviewed with him.  The potential bleeding infection MI PE death etc. reviewed.  The anticipated convalescence is reviewed.  Use of mesh and prosthetic materials is reviewed.  All questions were answered and consent is obtained    Note is made the patient has an extremely large hernia with the right scrotum being greater than 6 x 6 inches in circumference.  Patient's family were advised that the right testicle will be at risk for atrophy or loss.  In his to that a deliberate orchiectomy may need to be carried out depending on the operative findings.  Again consent for orchiectomy was also obtained    With reference to the left side, consent for left-sided repair was also obtained.    Patient was taken to the operating room placed table in supine position general esthesia was obtained.  Dalton catheter was placed per thighs, external genitalia and abdomen were now prepped and draped in a sterile fashion.  Timeout procedures were followed.  Antibiotics were given.  DVT prophylaxis obtained using subcutaneous heparin placement of external meta compression stockings    Generous right inguinal hernia incision was now made and dissection carried down to the skin, subcutaneous tissues and Parveen's fascia.  The external oblique was opened in direction of its fibers.  After this was accomplished a good deal of the herniated hollow viscera from the right scrotum can be reduced    Cord was mobilized controlled with a Penrose drain    Careful and tedious dissection was now carried out  extremely large indirect sac from the cord structures.  And the sac ran all the way into the scrotum    After complete freeing up all of the sac from the cord structures cord was  inspected.  Cord was intact.  There were pulses throughout.    Attention was now directed to the large sac.  The way relatively thin portion this was now opened and the sac explored there was no obvious incarcerated component sac was further opened.  At the base there was a sliding component of the right colon and cecum.  There is no lobe sliding bladder component    Using a running 2-0 Prolene stitch, pursestring was placed.  This placed along the origins of the sac and the edge of the sliding component.  Was now tied and secured.  The extra portions of the sac were now excised    After this was accomplished the indirect sac stump reduced nicely back and through the internal ring    Irrigation was performed counts reported as correct hemostasis obtained    3 x 6 inch piece of flat polypropylene mesh was now brought onto the field.  Was anchored to the pubic tubercle and Alexys's ligament using multiple interrupted 0 Prolene stitches.  Double-armed 2 oh mesh was further anchored to the lateral edge conjoined tendon using multiple interrupted 0 Prolene stitches.  Double-armed 2-0 Prolene was used to further anchor the mesh to the tubercle.  1 arm of the 2-0 Prolene was used to anchor the mesh in a running fashion to the ilio pubic tract and shelving edge of the ligament.  Second arm of the 2-0 Prolene was used to anchor the mesh in a running fashion to the lateral edge of the conjoined tendon the superior margin the transversalis arch.  Laterally a transverse slit was created the mesh so as to allow passage of the cord.  Defect in the mesh was tightened with several interrupted Prolene sutures    The repair was inspected hemostasis is good counts reported as correct    The external Bleich was now all closed with running 2-0 Vicryl suture    Testicle and was in the scrotum there appeared to be no torsion on the cord    A 10 mm flat Elmer-Eric drain was placed on top of the external Bleich and position of the  scrotum.  Subcutaneous tissues were closed over this using 3-0 Vicryl.  Skin was closed with staples    Patient tolerated the procedure well    I did not try to fix the left side as I thought this was extremely extensive repair on the right.  The left sided hernia will need to be dealt with at another setting        Eleazar Noe  Phone Number: [phone]

## 2024-04-26 NOTE — ANESTHESIA POSTPROCEDURE EVALUATION
Patient: Emanuel Lopez    Procedure Summary       Date: 04/26/24 Room / Location: [facility] OR 08 / Virtual [facility] OR    Anesthesia Start: 1340 Anesthesia Stop:     Procedure: Repair of right scrotal hernia (Bilateral) Diagnosis:       Non-recurrent unilateral inguinal hernia without obstruction or gangrene      (Non-recurrent unilateral inguinal hernia without obstruction or gangrene [K40.90])    Surgeons: Eleazar Noe MD Responsible Provider: ASHLIE Jimenez    Anesthesia Type: general ASA Status: 2            Anesthesia Type: general    Vitals Value Taken Time   /103 04/26/24 1605   Temp 36.4 04/26/24 1605   Pulse 81 04/26/24 1605   Resp 11 04/26/24 1605   SpO2 97 % 04/26/24 1603   Vitals shown include unfiled device data.    Anesthesia Post Evaluation    Patient location during evaluation: PACU  Patient participation: complete - patient participated  Level of consciousness: awake and alert  Pain management: satisfactory to patient  Multimodal analgesia pain management approach  Airway patency: patent  Two or more strategies used to mitigate risk of obstructive sleep apnea  Cardiovascular status: acceptable and hemodynamically stable  Respiratory status: acceptable, face mask, nonlabored ventilation and spontaneous ventilation  Hydration status: euvolemic  Postoperative Nausea and Vomiting: none        No notable events documented.

## 2024-04-26 NOTE — ANESTHESIA PREPROCEDURE EVALUATION
Patient: Emanuel Lopez    Procedure Information       Anesthesia Start Date/Time: 04/26/24 1340    Procedure: Repair of right scrotal hernia, possible right orchiectomy, repair of left inguinal hernia (Bilateral)    Location: [facility] OR 08 / [facility] OR    Surgeons: Eleazar Noe MD            Relevant Problems   No relevant active problems       Clinical information reviewed:   Tobacco  Allergies  Meds   Med Hx  Surg Hx   Fam Hx  Soc Hx        NPO Detail:  NPO/Void Status  Carbohydrate Drink Given Prior to Surgery? : N  Date of Last Liquid: 04/25/24  Time of Last Liquid: 1800  Date of Last Solid: 04/25/24  Time of Last Solid: 1800  Last Intake Type: Food  Time of Last Void: 1300         Physical Exam    Airway  Mallampati: III  TM distance: >3 FB  Neck ROM: full     Cardiovascular - normal exam     Dental - normal exam     Pulmonary - normal exam     Abdominal - normal exam           Vitals:    04/26/24 1316   BP: (!) 184/106   Pulse: 89   Resp: 20   Temp: 37.3 °C (99.1 °F)   SpO2: 98%       Past Surgical History:   Procedure Laterality Date    PROSTATE SURGERY      biopsy     Past Medical History:   Diagnosis Date    Anemia     BPH (benign prostatic hyperplasia)     Hypertension     Inguinal hernia of left side without obstruction or gangrene     Prostate cancer (Multi)      No current facility-administered medications for this encounter.    Facility-Administered Medications Ordered in Other Encounters:     dexAMETHasone (Decadron) injection, , intravenous, PRN, ASHLIE Jimenez, 4 mg at 04/26/24 1404    fentaNYL PF (Sublimaze) injection, , intravenous, PRN, ASHLIE Jimenez, 25 mcg at 04/26/24 1402    HYDROmorphone (Dilaudid) injection, , intravenous, PRN, ASHLIE Jimenez, 0.5 mg at 04/26/24 1413    lactated Ringer's infusion, , intravenous, Continuous PRN, ASHLIE Jimenez, New Bag at 04/26/24 1333    lidocaine PF (Xylocaine) 20 mg/mL (2 %) injection, , intravenous, PRN, Omer Walker  "CAA, 100 mg at 04/26/24 1351    midazolam (Versed) injection, , intravenous, PRN, ASHLIE Jimenez, 1 mg at 04/26/24 1343    propofol (Diprivan) injection, , intravenous, PRN, ASHLIE Jimenez, 140 mg at 04/26/24 1351    rocuronium (Zemuron) injection, , intravenous, PRN, ASHLIE Jimenez, 20 mg at 04/26/24 1413  Prior to Admission medications    Medication Sig Start Date End Date Taking? Authorizing Provider   amLODIPine (Norvasc) 10 mg tablet Take 1 tablet (10 mg) by mouth once daily.   Yes Historical Provider, MD   chlorhexidine (Peridex) 0.12 % solution Sig: 15 mls swish and spit the night before surgery and 15mls swish and spit the morning of surgery do not swallow 4/25/24  Yes Eleazar Noe MD   losartan (Cozaar) 100 mg tablet Take 1 tablet (100 mg) by mouth once daily. 4/16/24  Yes Historical Provider, MD   potassium chloride CR 10 mEq ER tablet Take 1 tablet (10 mEq) by mouth once daily.   Yes Historical Provider, MD   tamsulosin (Flomax) 0.4 mg 24 hr capsule Take 1 capsule (0.4 mg) by mouth once daily.   Yes Historical Provider, MD   acetaminophen-codeine (Tylenol w/ Codeine #3) 300-30 mg tablet Take 1 tablet by mouth every 6 hours if needed for moderate pain (4 - 6). 4/22/24   Eleazar Noe MD     No Known Allergies  Social History     Tobacco Use    Smoking status: Never    Smokeless tobacco: Never   Substance Use Topics    Alcohol use: Never         Chemistry    No results found for: \"NA\", \"K\", \"CL\", \"CO2\", \"BUN\", \"CREATININE\", \"GLU\" No results found for: \"CALCIUM\", \"ALKPHOS\", \"AST\", \"ALT\", \"BILITOT\"       No results found for: \"WBC\", \"HGB\", \"HCT\", \"PLT\"  No results found for: \"PROTIME\", \"PTT\", \"INR\"  Encounter Date: 04/25/24   ECG 12 lead (Ancillary Performed)   Result Value    Ventricular Rate 71    Atrial Rate 71    MD Interval 192    QRS Duration 92    QT Interval 404    QTC Calculation(Bazett) 439    P Axis 69    R Axis 14    T Axis 64    QRS Count 12    Q Onset 221    P Onset 125    " P Offset 184    T Offset 423    QTC Fredericia 427    Narrative    Normal sinus rhythm  Moderate voltage criteria for LVH, may be normal variant  Borderline ECG  No previous ECGs available        Anesthesia Plan    History of general anesthesia?: no  History of complications of general anesthesia?: no    ASA 2     general     The patient is not a current smoker.    intravenous induction   Anesthetic plan and risks discussed with patient.    Plan discussed with CAA.

## 2024-04-26 NOTE — ANESTHESIA PROCEDURE NOTES
Airway  Date/Time: 4/26/2024 1:54 PM  Urgency: elective    Airway not difficult    Staffing  Performed: ASHLIE   Authorized by: Rohan Hyatt MD    Performed by: ASHLIE Jimenez  Patient location during procedure: OR    Indications and Patient Condition  Indications for airway management: anesthesia  Spontaneous Ventilation: absent  Sedation level: deep  Preoxygenated: yes  Patient position: sniffing  MILS not maintained throughout  Mask difficulty assessment: 1 - vent by mask  Planned trial extubation    Final Airway Details  Final airway type: endotracheal airway      Successful airway: ETT  Cuffed: yes   Successful intubation technique: direct laryngoscopy  Facilitating devices/methods: intubating stylet  Endotracheal tube insertion site: oral  Blade: Carrie  Blade size: #3  ETT size (mm): 7.5  Cormack-Lehane Classification: grade I - full view of glottis  Placement verified by: chest auscultation and capnometry   Cuff volume (mL): 8  Measured from: lips  ETT to lips (cm): 23  Number of attempts at approach: 1  Number of other approaches attempted: 0    Additional Comments  Atraumatic x1 attempt

## 2024-04-26 NOTE — BRIEF OP NOTE
Date: 2024  OR Location: [facility] OR    Name: Emanuel Lopez, : 1955, Age: 68 y.o., MRN: [id number], Sex: male    Diagnosis  Pre-op Diagnosis     * Non-recurrent unilateral inguinal hernia without obstruction or gangrene [K40.90] Post-op Diagnosis     * Non-recurrent unilateral inguinal hernia without obstruction or gangrene [K40.90]     Procedures  Repair of right scrotal hernia, possible right orchiectomy, repair of left inguinal hernia  [address] - [place] [facility] 1ST INGUN HRNA AGE 5 YRS/> REDUCIBLE      Surgeons      * Eleazar Noe - Primary    Resident/Fellow/Other Assistant:  Surgeons and Role:  * No surgeons found with a matching role *    Procedure Summary  Anesthesia: General  ASA: II  Anesthesia Staff: Anesthesiologist: Alan Champagne MD; Rohan Hyatt MD  C-AA: ASHLIE Jimenez  Estimated Blood Loss: Less than 50 mL  Intra-op Medications:   Administrations occurring from 1325 to 1520 on 24:   Medication Name Total Dose   ceFAZolin in dextrose (iso-os) (Ancef) IVPB 2 g 2 g              Anesthesia Record               Intraprocedure I/O Totals          Intake    LR infusion 1000.00 mL    Total Intake 1000 mL       Output    Urine 300 mL    Est. Blood Loss 50 mL    Total Output 350 mL       Net    Net Volume 650 mL          Specimen:   ID Type Source Tests Collected by Time   1 : HERNIA SAC AND HERNIA TISSUE Tissue HERNIA SAC SURGICAL PATHOLOGY EXAM Eleazar Noe MD 2024 1454   A : URINE CULTURE, MERCADO PROTOCOL Fluid URINE CATHETERIZED URINE CULTURE Eleazar Noe MD 2024 1402        Staff:   Circulator: Arturo Kapoor RN  Relief Circulator: Jackie Driscoll RN; Chen Hermosillo RN  Relief Scrub: Saba Pollard  Scrub Person: Ashlie Liam          Findings: Large joint indirect sliding hernia extending into scrotum    Complications:  None; patient tolerated the procedure well.     Disposition: PACU - hemodynamically stable.  Condition: stable  Specimens Collected:   ID Type  Source Tests Collected by Time   1 : HERNIA SAC AND HERNIA TISSUE Tissue HERNIA SAC SURGICAL PATHOLOGY EXAM Eleazar Noe MD 4/26/2024 1154   A : URINE CULTURE, MERCADO PROTOCOL Fluid URINE CATHETERIZED URINE CULTURE Eleazar Noe MD 4/26/2024 1402     Attending Attestation: I performed the procedure.    Eleazar Noe  Phone Number: [phone]

## 2024-04-27 LAB — STAPHYLOCOCCUS SPEC CULT: NORMAL

## 2024-04-28 LAB — BACTERIA UR CULT: NO GROWTH

## 2024-04-30 ENCOUNTER — OFFICE VISIT (OUTPATIENT)
Dept: SURGERY | Facility: CLINIC | Age: 69
End: 2024-04-30
Payer: COMMERCIAL

## 2024-04-30 VITALS
OXYGEN SATURATION: 98 % | DIASTOLIC BLOOD PRESSURE: 110 MMHG | RESPIRATION RATE: 16 BRPM | SYSTOLIC BLOOD PRESSURE: 191 MMHG | TEMPERATURE: 98 F | HEART RATE: 99 BPM

## 2024-04-30 DIAGNOSIS — K40.90 NON-RECURRENT UNILATERAL INGUINAL HERNIA WITHOUT OBSTRUCTION OR GANGRENE: Primary | ICD-10-CM

## 2024-04-30 PROCEDURE — 99024 POSTOP FOLLOW-UP VISIT: CPT | Performed by: SURGERY

## 2024-04-30 PROCEDURE — 1159F MED LIST DOCD IN RCRD: CPT | Performed by: SURGERY

## 2024-04-30 NOTE — PROGRESS NOTES
Chief complaint/    Postop        HPI/    68-year-old male status post 4/26/2024 open repair of large right scrotal hernia.    Given complexity of the right-sided hernia the left was not repaired    Patient returns in follow-up.  Drain putting out less than 10 cc a day.  No problems caring with the Dalton    Patient is eating and had a bowel movement    Patient off pain pills and just on Motrin        Physical exam/    Right drain with serosanguineous output, minimal, the drain is removed    The Dalton is removed    The right groin wound is clean and dry with minimal swelling.  There is little bit of swelling in the scrotum        Assessment/    Doing well after repair of right scrotal hernia        Plan/    The drain and Dalton are removed    Patient will continue to continue to gradually increase his level activity    Will repair left-sided hernia under general anesthesia and open fashion next week

## 2024-05-07 ENCOUNTER — APPOINTMENT (OUTPATIENT)
Dept: SURGERY | Facility: CLINIC | Age: 69
End: 2024-05-07
Payer: MEDICARE

## 2024-05-07 RX ORDER — ACETAMINOPHEN AND CODEINE PHOSPHATE 300; 30 MG/1; MG/1
1 TABLET ORAL EVERY 6 HOURS PRN
Qty: 15 TABLET | Refills: 0 | Status: SHIPPED | OUTPATIENT
Start: 2024-05-07 | End: 2024-05-09 | Stop reason: HOSPADM

## 2024-05-07 RX ORDER — HEPARIN SODIUM 5000 [USP'U]/ML
5000 INJECTION, SOLUTION INTRAVENOUS; SUBCUTANEOUS
Status: CANCELLED | OUTPATIENT
Start: 2024-05-07

## 2024-05-07 RX ORDER — CEFAZOLIN SODIUM 2 G/100ML
2 INJECTION, SOLUTION INTRAVENOUS
Status: CANCELLED | OUTPATIENT
Start: 2024-05-07

## 2024-05-07 NOTE — H&P
Emanuel Lopez   [id number]   1955         Chief Complaint/        Left inguinal hernia          HPI/    68-year-old male presented with bilateral hernias.  Patient is approximately 2 and half weeks status post repair of the large right scrotal hernia.  He presents now for repair of the left inguinal hernia    Past Medical History:   Diagnosis Date    Anemia     BPH (benign prostatic hyperplasia)     Hypertension     Inguinal hernia of left side without obstruction or gangrene     Prostate cancer (Multi)         Prostate cancer, no evidence of metastatic disease, treatment pending    Hypertension    Status post 4/26/2024 repair of right scrotal hernia            Social History     Socioeconomic History    Marital status: Single     Spouse name: Not on file    Number of children: Not on file    Years of education: Not on file    Highest education level: Not on file   Occupational History    Not on file   Tobacco Use    Smoking status: Never    Smokeless tobacco: Never   Vaping Use    Vaping status: Never Used   Substance and Sexual Activity    Alcohol use: Never    Drug use: Never    Sexual activity: Defer   Other Topics Concern    Not on file   Social History Narrative    Not on file     Social Determinants of Health     Financial Resource Strain: Low Risk  (6/26/2023)    Received from [facility]    Overall Financial Resource Strain (CARDIA)     Difficulty of Paying Living Expenses: Not hard at all   Food Insecurity: No Food Insecurity (6/26/2023)    Received from [facility]    Hunger Vital Sign     Worried About Running Out of Food in the Last Year: Never true     Ran Out of Food in the Last Year: Never true   Transportation Needs: Unknown (6/26/2023)    Received from [facility]    PRAPARE - Transportation     Lack of Transportation (Medical): Not on file     Lack of Transportation (Non-Medical): No   Physical Activity: Insufficiently Active  (7/19/2023)    Received from [facility]    Exercise Vital Sign     Days of Exercise per Week: 1 day     Minutes of Exercise per Session: 10 min   Stress: Not on file   Social Connections: Not on file   Intimate Partner Violence: Not on file   Housing Stability: Unknown (6/26/2023)    Received from [facility]    Housing Stability Vital Sign     Unable to Pay for Housing in the Last Year: Not on file     Number of Places Lived in the Last Year: Not on file     Unstable Housing in the Last Year: No      Non-smoker    Family History   Problem Relation Name Age of Onset    Cancer Father          Review of Systems   All other systems reviewed and are negative.       No current facility-administered medications for this encounter.    Current Outpatient Medications:     acetaminophen-codeine (Tylenol w/ Codeine #3) 300-30 mg tablet, Take 1 tablet by mouth every 6 hours if needed for moderate pain (4 - 6)., Disp: 15 tablet, Rfl: 0    amLODIPine (Norvasc) 10 mg tablet, Take 1 tablet (10 mg) by mouth once daily., Disp: , Rfl:     chlorhexidine (Peridex) 0.12 % solution, Sig: 15 mls swish and spit the night before surgery and 15mls swish and spit the morning of surgery do not swallow, Disp: 473 mL, Rfl: 0    losartan (Cozaar) 100 mg tablet, Take 1 tablet (100 mg) by mouth once daily., Disp: , Rfl:     potassium chloride CR 10 mEq ER tablet, Take 1 tablet (10 mEq) by mouth once daily., Disp: , Rfl:     tamsulosin (Flomax) 0.4 mg 24 hr capsule, Take 1 capsule (0.4 mg) by mouth once daily., Disp: , Rfl:        no      Xaralto  no      Eliquis  no      Coumadin  no      Plavix        No Known Allergies     ECG 12 lead (Ancillary Performed)  Normal sinus rhythm  Moderate voltage criteria for LVH, may be normal variant  Borderline ECG  No previous ECGs available  Confirmed by Saeid Pinedo ([id number]) on 4/30/2024 12:51:22 PM       I have reviewed the images and the reports      Laboratory  "data:   CBC:   No results found for: \"WBC\", \"RBC\", \"HGB\", \"HCT\", \"PLT\"]   [facility] Labs:   No results found for: \"GLUF\", \"NA\", \"K\", \"CL\", \"CO2\", \"BUN\", \"CREATININE\", \"CALCIUM\", \"PROT\", \"ALBUMIN\", \"BILITOT\", \"BILIDIR\", \"ALKPHOS\", \"AST\", \"ALT\"       I have reviewed the above laboratory studies      There were no vitals taken for this visit.       Physical Exam  Constitutional:       Appearance: Normal appearance.   HENT:      Head: Normocephalic and atraumatic.   Cardiovascular:      Rate and Rhythm: Normal rate and regular rhythm.   Pulmonary:      Effort: Pulmonary effort is normal.      Breath sounds: Normal breath sounds.   Abdominal:      Comments: Right scrotal swelling noted, right groin incision clean and dry without redness or drainage    Left inguinal hernia noted   Musculoskeletal:      Cervical back: Normal range of motion.   Neurological:      Mental Status: He is alert.                  Assessment/    Left inguinal hernia    Doing well after repair of large right scrotal hernia    Prostate cancer    Baseline health issues to include hypertension        Plan/    Patient convalescing well from right-sided repair.  Will proceed to repair of left-sided hernia    Note made of prostate issues.  Patient will be at risk for urinary retention.  He understands this.    Open repair of the inguinal hernia is reviewed.    The potential of bleeding, infection, MI, PE/DVT, death, etc. reviewed.  The anticipated convalescence is reviewed.  Use of mesh and prosthetic materials is reviewed.  The plan for postoperative pain management is reviewed.  All questions were answered and consent was obtained    "

## 2024-05-09 ENCOUNTER — APPOINTMENT (OUTPATIENT)
Dept: SURGERY | Facility: CLINIC | Age: 69
End: 2024-05-09
Payer: MEDICARE

## 2024-05-10 ENCOUNTER — APPOINTMENT (OUTPATIENT)
Dept: RADIOLOGY | Facility: HOSPITAL | Age: 69
DRG: 948 | End: 2024-05-10
Payer: MEDICARE

## 2024-05-10 ENCOUNTER — APPOINTMENT (OUTPATIENT)
Dept: SURGERY | Facility: CLINIC | Age: 69
End: 2024-05-10
Payer: MEDICARE

## 2024-05-10 ENCOUNTER — TELEPHONE (OUTPATIENT)
Dept: SURGERY | Facility: CLINIC | Age: 69
End: 2024-05-10

## 2024-05-10 ENCOUNTER — HOSPITAL ENCOUNTER (INPATIENT)
Facility: HOSPITAL | Age: 69
LOS: 3 days | Discharge: NURSING, REHAB OR LONG-TERM CARE | DRG: 948 | End: 2024-05-13
Attending: EMERGENCY MEDICINE | Admitting: INTERNAL MEDICINE
Payer: MEDICARE

## 2024-05-10 ENCOUNTER — APPOINTMENT (OUTPATIENT)
Dept: CARDIOLOGY | Facility: HOSPITAL | Age: 69
DRG: 948 | End: 2024-05-10
Payer: MEDICARE

## 2024-05-10 DIAGNOSIS — R53.1 WEAKNESS: Primary | ICD-10-CM

## 2024-05-10 LAB
ALBUMIN SERPL BCP-MCNC: 3.9 G/DL (ref 3.4–5)
ALP SERPL-CCNC: 69 U/L (ref 33–136)
ALT SERPL W P-5'-P-CCNC: 12 U/L (ref 10–52)
ANION GAP SERPL CALC-SCNC: 14 MMOL/L (ref 10–20)
APPEARANCE UR: CLEAR
AST SERPL W P-5'-P-CCNC: 19 U/L (ref 9–39)
BASOPHILS # BLD AUTO: 0.02 X10*3/UL (ref 0–0.1)
BASOPHILS NFR BLD AUTO: 0.1 %
BILIRUB DIRECT SERPL-MCNC: 0.1 MG/DL (ref 0–0.3)
BILIRUB SERPL-MCNC: 1.9 MG/DL (ref 0–1.2)
BILIRUB UR STRIP.AUTO-MCNC: NEGATIVE MG/DL
BUN SERPL-MCNC: 22 MG/DL (ref 6–23)
CALCIUM SERPL-MCNC: 9.3 MG/DL (ref 8.6–10.3)
CARDIAC TROPONIN I PNL SERPL HS: 13 NG/L (ref 0–20)
CARDIAC TROPONIN I PNL SERPL HS: 14 NG/L (ref 0–20)
CHLORIDE SERPL-SCNC: 101 MMOL/L (ref 98–107)
CK SERPL-CCNC: 355 U/L (ref 0–325)
CO2 SERPL-SCNC: 29 MMOL/L (ref 21–32)
COLOR UR: ABNORMAL
CREAT SERPL-MCNC: 1.12 MG/DL (ref 0.5–1.3)
EGFRCR SERPLBLD CKD-EPI 2021: 71 ML/MIN/1.73M*2
EOSINOPHIL # BLD AUTO: 0.01 X10*3/UL (ref 0–0.7)
EOSINOPHIL NFR BLD AUTO: 0 %
ERYTHROCYTE [DISTWIDTH] IN BLOOD BY AUTOMATED COUNT: 11.9 % (ref 11.5–14.5)
FLUAV RNA RESP QL NAA+PROBE: NOT DETECTED
FLUBV RNA RESP QL NAA+PROBE: NOT DETECTED
GLUCOSE SERPL-MCNC: 92 MG/DL (ref 74–99)
GLUCOSE UR STRIP.AUTO-MCNC: NORMAL MG/DL
HCT VFR BLD AUTO: 35.5 % (ref 41–52)
HGB BLD-MCNC: 11.7 G/DL (ref 13.5–17.5)
IMM GRANULOCYTES # BLD AUTO: 0.15 X10*3/UL (ref 0–0.7)
IMM GRANULOCYTES NFR BLD AUTO: 0.7 % (ref 0–0.9)
KETONES UR STRIP.AUTO-MCNC: NEGATIVE MG/DL
LEUKOCYTE ESTERASE UR QL STRIP.AUTO: NEGATIVE
LYMPHOCYTES # BLD AUTO: 0.67 X10*3/UL (ref 1.2–4.8)
LYMPHOCYTES NFR BLD AUTO: 3.2 %
MAGNESIUM SERPL-MCNC: 1.67 MG/DL (ref 1.6–2.4)
MCH RBC QN AUTO: 31.3 PG (ref 26–34)
MCHC RBC AUTO-ENTMCNC: 33 G/DL (ref 32–36)
MCV RBC AUTO: 95 FL (ref 80–100)
MONOCYTES # BLD AUTO: 0.94 X10*3/UL (ref 0.1–1)
MONOCYTES NFR BLD AUTO: 4.5 %
MUCOUS THREADS #/AREA URNS AUTO: NORMAL /LPF
NEUTROPHILS # BLD AUTO: 19.21 X10*3/UL (ref 1.2–7.7)
NEUTROPHILS NFR BLD AUTO: 91.5 %
NITRITE UR QL STRIP.AUTO: NEGATIVE
NRBC BLD-RTO: 0 /100 WBCS (ref 0–0)
PH UR STRIP.AUTO: 6.5 [PH]
PLATELET # BLD AUTO: 317 X10*3/UL (ref 150–450)
POTASSIUM SERPL-SCNC: 3.7 MMOL/L (ref 3.5–5.3)
PROT SERPL-MCNC: 6.7 G/DL (ref 6.4–8.2)
PROT UR STRIP.AUTO-MCNC: ABNORMAL MG/DL
RBC # BLD AUTO: 3.74 X10*6/UL (ref 4.5–5.9)
RBC # UR STRIP.AUTO: ABNORMAL /UL
RBC #/AREA URNS AUTO: NORMAL /HPF
SARS-COV-2 RNA RESP QL NAA+PROBE: NOT DETECTED
SODIUM SERPL-SCNC: 140 MMOL/L (ref 136–145)
SP GR UR STRIP.AUTO: 1.02
UROBILINOGEN UR STRIP.AUTO-MCNC: ABNORMAL MG/DL
WBC # BLD AUTO: 21 X10*3/UL (ref 4.4–11.3)
WBC #/AREA URNS AUTO: NORMAL /HPF

## 2024-05-10 PROCEDURE — 2500000001 HC RX 250 WO HCPCS SELF ADMINISTERED DRUGS (ALT 637 FOR MEDICARE OP): Performed by: STUDENT IN AN ORGANIZED HEALTH CARE EDUCATION/TRAINING PROGRAM

## 2024-05-10 PROCEDURE — 71045 X-RAY EXAM CHEST 1 VIEW: CPT | Mod: FOREIGN READ | Performed by: RADIOLOGY

## 2024-05-10 PROCEDURE — 81001 URINALYSIS AUTO W/SCOPE: CPT | Performed by: EMERGENCY MEDICINE

## 2024-05-10 PROCEDURE — 93005 ELECTROCARDIOGRAM TRACING: CPT

## 2024-05-10 PROCEDURE — 93010 ELECTROCARDIOGRAM REPORT: CPT | Performed by: EMERGENCY MEDICINE

## 2024-05-10 PROCEDURE — 99285 EMERGENCY DEPT VISIT HI MDM: CPT | Mod: 25

## 2024-05-10 PROCEDURE — 71045 X-RAY EXAM CHEST 1 VIEW: CPT

## 2024-05-10 PROCEDURE — 84484 ASSAY OF TROPONIN QUANT: CPT | Performed by: EMERGENCY MEDICINE

## 2024-05-10 PROCEDURE — 72125 CT NECK SPINE W/O DYE: CPT | Performed by: RADIOLOGY

## 2024-05-10 PROCEDURE — 82248 BILIRUBIN DIRECT: CPT | Performed by: STUDENT IN AN ORGANIZED HEALTH CARE EDUCATION/TRAINING PROGRAM

## 2024-05-10 PROCEDURE — 99285 EMERGENCY DEPT VISIT HI MDM: CPT | Performed by: EMERGENCY MEDICINE

## 2024-05-10 PROCEDURE — 70450 CT HEAD/BRAIN W/O DYE: CPT

## 2024-05-10 PROCEDURE — 36415 COLL VENOUS BLD VENIPUNCTURE: CPT | Performed by: EMERGENCY MEDICINE

## 2024-05-10 PROCEDURE — 72125 CT NECK SPINE W/O DYE: CPT

## 2024-05-10 PROCEDURE — 1200000002 HC GENERAL ROOM WITH TELEMETRY DAILY

## 2024-05-10 PROCEDURE — 70450 CT HEAD/BRAIN W/O DYE: CPT | Performed by: RADIOLOGY

## 2024-05-10 PROCEDURE — 87636 SARSCOV2 & INF A&B AMP PRB: CPT | Performed by: STUDENT IN AN ORGANIZED HEALTH CARE EDUCATION/TRAINING PROGRAM

## 2024-05-10 PROCEDURE — 85025 COMPLETE CBC W/AUTO DIFF WBC: CPT | Performed by: EMERGENCY MEDICINE

## 2024-05-10 PROCEDURE — 82550 ASSAY OF CK (CPK): CPT | Performed by: STUDENT IN AN ORGANIZED HEALTH CARE EDUCATION/TRAINING PROGRAM

## 2024-05-10 PROCEDURE — 2500000006 HC RX 250 W HCPCS SELF ADMINISTERED DRUGS (ALT 637 FOR ALL PAYERS): Mod: MUE | Performed by: STUDENT IN AN ORGANIZED HEALTH CARE EDUCATION/TRAINING PROGRAM

## 2024-05-10 PROCEDURE — 80053 COMPREHEN METABOLIC PANEL: CPT | Performed by: EMERGENCY MEDICINE

## 2024-05-10 PROCEDURE — 2500000004 HC RX 250 GENERAL PHARMACY W/ HCPCS (ALT 636 FOR OP/ED): Performed by: STUDENT IN AN ORGANIZED HEALTH CARE EDUCATION/TRAINING PROGRAM

## 2024-05-10 PROCEDURE — 83735 ASSAY OF MAGNESIUM: CPT | Performed by: EMERGENCY MEDICINE

## 2024-05-10 RX ORDER — TAMSULOSIN HYDROCHLORIDE 0.4 MG/1
0.4 CAPSULE ORAL DAILY
Status: DISCONTINUED | OUTPATIENT
Start: 2024-05-10 | End: 2024-05-13 | Stop reason: HOSPADM

## 2024-05-10 RX ORDER — LOSARTAN POTASSIUM 100 MG/1
100 TABLET ORAL DAILY
Status: DISCONTINUED | OUTPATIENT
Start: 2024-05-10 | End: 2024-05-13 | Stop reason: HOSPADM

## 2024-05-10 RX ORDER — POTASSIUM CHLORIDE 750 MG/1
10 TABLET, FILM COATED, EXTENDED RELEASE ORAL DAILY
Status: DISCONTINUED | OUTPATIENT
Start: 2024-05-10 | End: 2024-05-11

## 2024-05-10 RX ORDER — ENOXAPARIN SODIUM 100 MG/ML
40 INJECTION SUBCUTANEOUS EVERY 24 HOURS
Status: DISCONTINUED | OUTPATIENT
Start: 2024-05-10 | End: 2024-05-13 | Stop reason: HOSPADM

## 2024-05-10 RX ORDER — AMLODIPINE BESYLATE 10 MG/1
10 TABLET ORAL DAILY
Status: DISCONTINUED | OUTPATIENT
Start: 2024-05-10 | End: 2024-05-13 | Stop reason: HOSPADM

## 2024-05-10 RX ADMIN — AMLODIPINE BESYLATE 10 MG: 10 TABLET ORAL at 23:23

## 2024-05-10 RX ADMIN — ENOXAPARIN SODIUM 40 MG: 40 INJECTION SUBCUTANEOUS at 23:23

## 2024-05-10 RX ADMIN — LOSARTAN POTASSIUM 100 MG: 100 TABLET, FILM COATED ORAL at 23:23

## 2024-05-10 RX ADMIN — POTASSIUM CHLORIDE 10 MEQ: 750 TABLET, EXTENDED RELEASE ORAL at 23:23

## 2024-05-10 RX ADMIN — TAMSULOSIN HYDROCHLORIDE 0.4 MG: 0.4 CAPSULE ORAL at 23:23

## 2024-05-10 SDOH — SOCIAL STABILITY: SOCIAL INSECURITY: HAS ANYONE EVER THREATENED TO HURT YOUR FAMILY OR YOUR PETS?: NO

## 2024-05-10 SDOH — SOCIAL STABILITY: SOCIAL INSECURITY: HAVE YOU HAD ANY THOUGHTS OF HARMING ANYONE ELSE?: NO

## 2024-05-10 SDOH — SOCIAL STABILITY: SOCIAL INSECURITY: ARE THERE ANY APPARENT SIGNS OF INJURIES/BEHAVIORS THAT COULD BE RELATED TO ABUSE/NEGLECT?: NO

## 2024-05-10 SDOH — SOCIAL STABILITY: SOCIAL INSECURITY: ARE YOU OR HAVE YOU BEEN THREATENED OR ABUSED PHYSICALLY, EMOTIONALLY, OR SEXUALLY BY ANYONE?: NO

## 2024-05-10 SDOH — SOCIAL STABILITY: SOCIAL INSECURITY: DOES ANYONE TRY TO KEEP YOU FROM HAVING/CONTACTING OTHER FRIENDS OR DOING THINGS OUTSIDE YOUR HOME?: NO

## 2024-05-10 SDOH — SOCIAL STABILITY: SOCIAL INSECURITY: DO YOU FEEL UNSAFE GOING BACK TO THE PLACE WHERE YOU ARE LIVING?: NO

## 2024-05-10 SDOH — SOCIAL STABILITY: SOCIAL INSECURITY: DO YOU FEEL ANYONE HAS EXPLOITED OR TAKEN ADVANTAGE OF YOU FINANCIALLY OR OF YOUR PERSONAL PROPERTY?: NO

## 2024-05-10 SDOH — SOCIAL STABILITY: SOCIAL INSECURITY: ABUSE: ADULT

## 2024-05-10 SDOH — SOCIAL STABILITY: SOCIAL INSECURITY: WERE YOU ABLE TO COMPLETE ALL THE BEHAVIORAL HEALTH SCREENINGS?: YES

## 2024-05-10 SDOH — SOCIAL STABILITY: SOCIAL INSECURITY: HAVE YOU HAD THOUGHTS OF HARMING ANYONE ELSE?: NO

## 2024-05-10 ASSESSMENT — COGNITIVE AND FUNCTIONAL STATUS - GENERAL
CLIMB 3 TO 5 STEPS WITH RAILING: TOTAL
WALKING IN HOSPITAL ROOM: A LOT
DAILY ACTIVITIY SCORE: 20
PATIENT BASELINE BEDBOUND: NO
CLIMB 3 TO 5 STEPS WITH RAILING: A LOT
HELP NEEDED FOR BATHING: A LITTLE
MOVING TO AND FROM BED TO CHAIR: A LOT
STANDING UP FROM CHAIR USING ARMS: A LOT
TOILETING: A LITTLE
MOVING FROM LYING ON BACK TO SITTING ON SIDE OF FLAT BED WITH BEDRAILS: A LITTLE
PERSONAL GROOMING: A LITTLE
DRESSING REGULAR LOWER BODY CLOTHING: A LITTLE
MOBILITY SCORE: 17
EATING MEALS: A LITTLE
MOBILITY SCORE: 12
MOVING TO AND FROM BED TO CHAIR: A LITTLE
DAILY ACTIVITIY SCORE: 18
DRESSING REGULAR UPPER BODY CLOTHING: A LITTLE
STANDING UP FROM CHAIR USING ARMS: A LOT
EATING MEALS: A LITTLE
DRESSING REGULAR UPPER BODY CLOTHING: A LITTLE
WALKING IN HOSPITAL ROOM: A LOT
PERSONAL GROOMING: A LITTLE
TOILETING: A LITTLE
TURNING FROM BACK TO SIDE WHILE IN FLAT BAD: A LOT

## 2024-05-10 ASSESSMENT — LIFESTYLE VARIABLES
AUDIT-C TOTAL SCORE: 0
HAVE YOU EVER FELT YOU SHOULD CUT DOWN ON YOUR DRINKING: NO
PRESCIPTION_ABUSE_PAST_12_MONTHS: NO
HOW MANY STANDARD DRINKS CONTAINING ALCOHOL DO YOU HAVE ON A TYPICAL DAY: PATIENT DOES NOT DRINK
TOTAL SCORE: 0
AUDIT-C TOTAL SCORE: 0
HOW OFTEN DO YOU HAVE A DRINK CONTAINING ALCOHOL: NEVER
HAVE PEOPLE ANNOYED YOU BY CRITICIZING YOUR DRINKING: NO
SUBSTANCE_ABUSE_PAST_12_MONTHS: NO
SKIP TO QUESTIONS 9-10: 1
HOW OFTEN DO YOU HAVE 6 OR MORE DRINKS ON ONE OCCASION: NEVER
EVER HAD A DRINK FIRST THING IN THE MORNING TO STEADY YOUR NERVES TO GET RID OF A HANGOVER: NO
EVER FELT BAD OR GUILTY ABOUT YOUR DRINKING: NO

## 2024-05-10 ASSESSMENT — PAIN - FUNCTIONAL ASSESSMENT
PAIN_FUNCTIONAL_ASSESSMENT: 0-10

## 2024-05-10 ASSESSMENT — ACTIVITIES OF DAILY LIVING (ADL)
LACK_OF_TRANSPORTATION: NO
DRESSING YOURSELF: INDEPENDENT
HEARING - LEFT EAR: FUNCTIONAL
WALKS IN HOME: INDEPENDENT
FEEDING YOURSELF: INDEPENDENT
ADEQUATE_TO_COMPLETE_ADL: YES
HEARING - RIGHT EAR: FUNCTIONAL
TOILETING: UNABLE TO ASSESS
PATIENT'S MEMORY ADEQUATE TO SAFELY COMPLETE DAILY ACTIVITIES?: YES
JUDGMENT_ADEQUATE_SAFELY_COMPLETE_DAILY_ACTIVITIES: YES
BATHING: INDEPENDENT
GROOMING: INDEPENDENT

## 2024-05-10 ASSESSMENT — COLUMBIA-SUICIDE SEVERITY RATING SCALE - C-SSRS
2. HAVE YOU ACTUALLY HAD ANY THOUGHTS OF KILLING YOURSELF?: NO
6. HAVE YOU EVER DONE ANYTHING, STARTED TO DO ANYTHING, OR PREPARED TO DO ANYTHING TO END YOUR LIFE?: NO
1. IN THE PAST MONTH, HAVE YOU WISHED YOU WERE DEAD OR WISHED YOU COULD GO TO SLEEP AND NOT WAKE UP?: NO

## 2024-05-10 ASSESSMENT — PATIENT HEALTH QUESTIONNAIRE - PHQ9
1. LITTLE INTEREST OR PLEASURE IN DOING THINGS: NOT AT ALL
SUM OF ALL RESPONSES TO PHQ9 QUESTIONS 1 & 2: 0
2. FEELING DOWN, DEPRESSED OR HOPELESS: NOT AT ALL

## 2024-05-10 ASSESSMENT — PAIN SCALES - GENERAL
PAINLEVEL_OUTOF10: 0 - NO PAIN

## 2024-05-10 ASSESSMENT — PAIN DESCRIPTION - PAIN TYPE: TYPE: ACUTE PAIN

## 2024-05-10 NOTE — ED PROVIDER NOTES
"HPI   Chief Complaint   Patient presents with    Fall     Reports fell this am and could not get off the floor.        HPI  Emanuel Lopez is a 68 year old male with a past medical history of HTN and prostate cancer who presents to the ED with a chief complaint of generalized weakness and recent fall. The patient states that while trying to get out of bed this morning, he felt weak and his legs \"gave out\" resulting in him falling back into the bed a few times before falling on ground. He denies hitting his head and did not LOC. Currently not on blood thinners. Per the patient's daughter who was bedside, the patient used a plastic container that was on the ground to slide himself from his bedroom to the living room where she found him when she woke up. She is unsure how long he had been down for. The patient did state that he is s/p hernia repair and was supposed to report to Dr. Noe (general surgery) for a follow up but was unable to make it. He denies any post op complications. No headaches, vision changes, chest pain, SOB, abdominal pain, NVD, numbness or tingling.                   Abhi Coma Scale Score: 15                     Patient History   Past Medical History:   Diagnosis Date    Anemia     BPH (benign prostatic hyperplasia)     Hypertension     Inguinal hernia of left side without obstruction or gangrene     Prostate cancer (Multi)      Past Surgical History:   Procedure Laterality Date    PROSTATE SURGERY      biopsy     Family History   Problem Relation Name Age of Onset    Cancer Father       Social History     Tobacco Use    Smoking status: Never    Smokeless tobacco: Never   Vaping Use    Vaping status: Never Used   Substance Use Topics    Alcohol use: Never    Drug use: Never       Physical Exam   ED Triage Vitals [05/10/24 1006]   Temperature Heart Rate Respirations BP   36.2 °C (97.2 °F) 90 18 174/86      Pulse Ox Temp Source Heart Rate Source Patient Position   98 % Temporal Monitor " Sitting      BP Location FiO2 (%)     Right arm --       Physical Exam  Constitutional:       General: He is not in acute distress.  HENT:      Head: Normocephalic and atraumatic.      Right Ear: Tympanic membrane, ear canal and external ear normal.      Left Ear: Tympanic membrane, ear canal and external ear normal.      Mouth/Throat:      Mouth: Mucous membranes are moist.   Eyes:      Extraocular Movements: Extraocular movements intact.      Conjunctiva/sclera: Conjunctivae normal.      Pupils: Pupils are equal, round, and reactive to light.   Cardiovascular:      Rate and Rhythm: Normal rate and regular rhythm.      Pulses: Normal pulses.      Heart sounds: Normal heart sounds.   Pulmonary:      Effort: Pulmonary effort is normal.      Breath sounds: Normal breath sounds.   Abdominal:      General: Bowel sounds are normal.      Palpations: Abdomen is soft.      Tenderness: There is no abdominal tenderness. There is no guarding or rebound.   Musculoskeletal:         General: No signs of injury.   Skin:     General: Skin is warm and dry.      Capillary Refill: Capillary refill takes less than 2 seconds.   Neurological:      Mental Status: He is alert.      Cranial Nerves: No cranial nerve deficit.      Sensory: No sensory deficit.      Motor: Weakness present.      Comments: Alert but more confused compared to his baseline         ED Course & MDM   Diagnoses as of 05/10/24 1644   Weakness       Medical Decision Making  =================Attending note===============    The patient was seen by the resident/fellow.  I have personally performed a substantive portion of the encounter.  I have seen and examined the patient; agree with the workup, evaluation, MDM,   management and diagnosis.  The care plan has been discussed with the resident; I have reviewed the resident's note and agree with the documented findings.      This is a 68 y.o. male who presents to ER with generalized weakness and fall.  Patient states he  was try to get out of bed this morning any was too weak and he cannot get his legs to work like they normally would.  States that he fell back into bed a few times.  I did take an extended conversation to find out how he got to the floor.  His daughter was present for this conversation.  It sounds like he did fall to the floor and he was trying to get out of bed.  He then used a plastic container they had in their room to use to help slide himself.  His daughter found him in the living room on the ground laying on the floor in front of a loveseat.  They cannot get him up off the ground because of his weakness.  Patient did just have a hernia repair on April 26 with Dr. Noe.  He was getting up this morning to go get his staples removed.  He is not on any blood thinners.  He does have a history of hypertension and prostate cancer.    Heart is regular.  Lungs are clear.  Abdomen is soft and nontender.  Moving extremities x 4 without difficulty.  No pain in his upper or lower extremities.  No pain in his hips.  Denies neck pain.  Head is atraumatic.    EKG: Normal sinus rhythm with a rate of 98.  .  QTc 469.  No ST elevations.  Repeat EKG: Normal sinus rhythm with a ventricular rate of 97.  .  QTc 454.  No ST changes.    Since is unclear the exact mechanism of his fall and the confusion we will check a head and C-spine CT.  Will also check cardiac lab work and urine.    Troponin negative x 2.  No definitive UTI.  COVID and flu are negative.  CK was only minimally elevated.  Chemistry is unremarkable except for mildly elevated bilirubin.  White count is elevated.  There is no significant anemia.  CT of the head had no acute findings.  There is degenerative findings of the neck.    We did attempt to ambulate the patient and he cannot get up.  Therefore patient is admitted for further evaluation.  He will need further evaluated for this elevated leukocytosis.  No recent labs for  comparison.        ==========================================        Emanuel Lopez is a 68 year old male with a past medical history of HTN and prostate cancer who presents to the ED with a chief complaint of generalized weakness and recent fall. Upon arrival to the emergency department, the patient was hemodynamically stable. Given the unclear mechanism of his fall and his confusion, we did proceed with a workup including a CBC, CMP, magnesium, serial troponins, EKG, urinalysis, CT head and cervical spine, and chest x-ray.    On blood work, he was noted to have leukocytosis 21 as well as mild anemia with a hemoglobin at 11.7.  Otherwise no other concerns.  Checks x-ray was nonconcerning for any acute cardiopulmonary pathology.  CT head and cervical spine did not show any abnormalities or fractures.  On reevaluation, patient was unable to ambulate.  Given his concerns of weakness and inability to ambulate, protowas appropriate to admit the patient to the medicine team.  Medicine was consulted and agreed with this plan.  At transfer, patient was hemodynamically stable.  Procedure  Procedures     Kathy Gillespie MD  Resident  05/10/24 1646       Lyle Lui, DO  05/10/24 2200       Lyle Lui, DO  05/12/24 2213

## 2024-05-10 NOTE — TELEPHONE ENCOUNTER
Pt's daughter Pao called to cancel appointment for today stating she woke up and found Emanuel on the ground,  admits that he was on the ground unable to get up for a couple of hours.  Pt states he slipped causing him to fall and was scooting attempting to get up but could not.  Pt denies dizziness, chest pain or sob,  denies LOC or head injury.  Pao and her  were able to assist him up from the ground.  No obvious injury noted per pt's daughter.  Pt moving all extremities without difficulty or adverse reaction per daughter.  Advised that pt needs to be evaluated in the emergency room.  Dr. Hasmukh richards.

## 2024-05-10 NOTE — H&P
History Of Present Illness  Emanuel Lopez is a 68 y.o. male presenting with sudden onset weakness.  He has significant history of recent right inguinal hernia repair with Dr. Noe on 4/20/2024, which was uncomplicated and had subsequent drain removal on 4/30.  He was of his usual health since his procedure, however today he was to follow-up with Dr. Noe for subsequent staple removal.  When he awoke at around 5 AM today he felt extremely weak.  He attempted to stand out of bed, however cannot do so and slowly let himself down to the ground.  He then felt too weak to pick himself off the ground, thus was able to grab a plastic laundry basket and slid himself to the living room.  He estimates that this lasted about several hours before being found by his daughter in the living room.  He was then brought to the ED for further evaluation.  Denies any recent new medications or recent illness.  Denies any issues with his surgical site, no drainage or significant abdominal pain.  Denies any palpitations or chest pain or shortness of breath.  Denies focal weakness.  He lives at home with his daughter and son-in-law, ambulates with a cane.    ED course: Initial vitals 36.2 °C, 90 bpm, 174/86, saturating 98% on room air.  Initial EKG 98 bpm, normal sinus rhythm, isolated ST elevation in V3, however this is seen in prior EKG on 4/25/2024.  Lab work demonstrating new leukocytosis of 21, and anemia hemoglobin 11.7.  CT head and C-spine were obtained negative for acute pathology.  CXR demonstrated small nodular opacities in right midlung, but otherwise no acute process.  Patient failed to ambulate with assistance in the ED, thus was admitted for further evaluation.    PMHx: Prostate adenocarcinoma Quin 6 low-grade pending prostatectomy with Dr. Irby, HTN, chronic normocytic anemia  SurgHx: Right inguinal hernia repair 4/20/2024 with Dr. Noe with subsequent drain removal 4/30, prostate biopsy  FamHx:  Reviewed  Social Hx: Denies tobacco use, recreational drug use, alcohol use  Allergies: NKDA    CODE STATUS: DNR-DNI     Past Medical History  Past Medical History:   Diagnosis Date    Anemia     BPH (benign prostatic hyperplasia)     Hypertension     Inguinal hernia of left side without obstruction or gangrene     Prostate cancer (Multi)        Surgical History  Past Surgical History:   Procedure Laterality Date    HERNIA REPAIR      PROSTATE SURGERY      biopsy         Social History   reports that he has never smoked. He has never used smokeless tobacco. He reports that he does not drink alcohol and does not use drugs.    Family History  Family History   Problem Relation Name Age of Onset    Cancer Father          Allergies  Patient has no known allergies.    Review of Systems   Review of Systems (bold = positive):     Constitutional: Fever, Chills  Eyes: Blurry Vision, Vision Loss/ Change  ENMT: Nasal Discharge, Nasal Congestion  Respiratory: Dry Cough, Productive Cough, Wheezing, Shortness of Breath  Cardiac: Chest Pain, Syncope, Palpitations  Gastrointestinal: Nausea, Vomiting, Diarrhea, Constipation, Abdominal Pain  Genitourinary: Discharge, Dysuria, Flank Pain  Musculoskeletal: Pain, Swelling, Weakness  Neurological:  Dizziness, Confusion, Headache, Syncope  Skin: Pain, Rash, Ulcer  Endocrine: Sweat, Polyuria  Hematologic/Lymph:  Night Sweats, Petechiae  Allergic/Immunologic: Anaphylaxis    Physical Exam   Constitutional: Well developed, awake/alert/oriented x4, no distress, alert and cooperative  Skin: Warm and dry, no lesions, no rashes.  Right inguinal surgical site appears clean, no drainage, staples intact  Eyes: Anicteric, clear sclera  ENMT: mucous membranes moist, no apparent injury, no lesions seen  Head/Neck: Atraumatic  Respiratory: Lungs clear to auscultation bilaterally with no wheezes, rhonci or crackles  CV: Regular rate and rhythm, no murmurs or gallops auscultated  GI: Non-tender to deep  palpation, no guarding.  No pain on palpation of inguinal repair site  MSK: no joint swelling  Neuro: NIH 0, no focal deficits or loss of sensation  Extremities: normal extremities, no cyanosis edema, contusions or wounds, no clubbing  Psych: Appropriate mood and behavior    Last Recorded Vitals  /89   Pulse 93   Temp 36.2 °C (97.2 °F) (Temporal)   Resp 17   Wt 77.1 kg (170 lb)   SpO2 97%     Results  Results for orders placed or performed during the hospital encounter of 05/10/24 (from the past 24 hour(s))   ECG 12 lead   Result Value Ref Range    Ventricular Rate 98 BPM    Atrial Rate 98 BPM    GA Interval 174 ms    QRS Duration 96 ms    QT Interval 368 ms    QTC Calculation(Bazett) 469 ms    P Axis 68 degrees    R Axis -9 degrees    T Axis 55 degrees    QRS Count 16 beats    Q Onset 219 ms    P Onset 132 ms    P Offset 187 ms    T Offset 403 ms    QTC Fredericia 433 ms   CBC and Auto Differential   Result Value Ref Range    WBC 21.0 (H) 4.4 - 11.3 x10*3/uL    nRBC 0.0 0.0 - 0.0 /100 WBCs    RBC 3.74 (L) 4.50 - 5.90 x10*6/uL    Hemoglobin 11.7 (L) 13.5 - 17.5 g/dL    Hematocrit 35.5 (L) 41.0 - 52.0 %    MCV 95 80 - 100 fL    MCH 31.3 26.0 - 34.0 pg    MCHC 33.0 32.0 - 36.0 g/dL    RDW 11.9 11.5 - 14.5 %    Platelets 317 150 - 450 x10*3/uL    Neutrophils % 91.5 40.0 - 80.0 %    Immature Granulocytes %, Automated 0.7 0.0 - 0.9 %    Lymphocytes % 3.2 13.0 - 44.0 %    Monocytes % 4.5 2.0 - 10.0 %    Eosinophils % 0.0 0.0 - 6.0 %    Basophils % 0.1 0.0 - 2.0 %    Neutrophils Absolute 19.21 (H) 1.20 - 7.70 x10*3/uL    Immature Granulocytes Absolute, Automated 0.15 0.00 - 0.70 x10*3/uL    Lymphocytes Absolute 0.67 (L) 1.20 - 4.80 x10*3/uL    Monocytes Absolute 0.94 0.10 - 1.00 x10*3/uL    Eosinophils Absolute 0.01 0.00 - 0.70 x10*3/uL    Basophils Absolute 0.02 0.00 - 0.10 x10*3/uL   Comprehensive Metabolic Panel   Result Value Ref Range    Glucose 92 74 - 99 mg/dL    Sodium 140 136 - 145 mmol/L    Potassium  3.7 3.5 - 5.3 mmol/L    Chloride 101 98 - 107 mmol/L    Bicarbonate 29 21 - 32 mmol/L    Anion Gap 14 10 - 20 mmol/L    Urea Nitrogen 22 6 - 23 mg/dL    Creatinine 1.12 0.50 - 1.30 mg/dL    eGFR 71 >60 mL/min/1.73m*2    Calcium 9.3 8.6 - 10.3 mg/dL    Albumin 3.9 3.4 - 5.0 g/dL    Alkaline Phosphatase 69 33 - 136 U/L    Total Protein 6.7 6.4 - 8.2 g/dL    AST 19 9 - 39 U/L    Bilirubin, Total 1.9 (H) 0.0 - 1.2 mg/dL    ALT 12 10 - 52 U/L   Magnesium   Result Value Ref Range    Magnesium 1.67 1.60 - 2.40 mg/dL   Troponin I, High Sensitivity, Initial   Result Value Ref Range    Troponin I, High Sensitivity 13 0 - 20 ng/L   ECG 12 lead   Result Value Ref Range    Ventricular Rate 97 BPM    Atrial Rate 97 BPM    AR Interval 180 ms    QRS Duration 92 ms    QT Interval 358 ms    QTC Calculation(Bazett) 454 ms    P Axis 32 degrees    R Axis 11 degrees    T Axis 51 degrees    QRS Count 15 beats    Q Onset 220 ms    P Onset 130 ms    P Offset 183 ms    T Offset 399 ms    QTC Fredericia 420 ms   Troponin, High Sensitivity, 1 Hour   Result Value Ref Range    Troponin I, High Sensitivity 14 0 - 20 ng/L   Bilirubin, Direct   Result Value Ref Range    Bilirubin, Direct 0.1 0.0 - 0.3 mg/dL   Creatine Kinase   Result Value Ref Range    Creatine Kinase 355 (H) 0 - 325 U/L   Urinalysis with Reflex Culture and Microscopic   Result Value Ref Range    Color, Urine Light-Yellow Light-Yellow, Yellow, Dark-Yellow    Appearance, Urine Clear Clear    Specific Gravity, Urine 1.017 1.005 - 1.035    pH, Urine 6.5 5.0, 5.5, 6.0, 6.5, 7.0, 7.5, 8.0    Protein, Urine 20 (TRACE) NEGATIVE, 10 (TRACE), 20 (TRACE) mg/dL    Glucose, Urine Normal Normal mg/dL    Blood, Urine 0.1 (1+) (A) NEGATIVE    Ketones, Urine NEGATIVE NEGATIVE mg/dL    Bilirubin, Urine NEGATIVE NEGATIVE    Urobilinogen, Urine 2 (1+) (A) Normal mg/dL    Nitrite, Urine NEGATIVE NEGATIVE    Leukocyte Esterase, Urine NEGATIVE NEGATIVE   Urinalysis Microscopic   Result Value Ref Range     WBC, Urine NONE 1-5, NONE /HPF    RBC, Urine 1-2 NONE, 1-2, 3-5 /HPF    Mucus, Urine FEW Reference range not established. /LPF   Sars-CoV-2 PCR   Result Value Ref Range    Coronavirus 2019, PCR Not Detected Not Detected   Influenza A, and B PCR   Result Value Ref Range    Flu A Result Not Detected Not Detected    Flu B Result Not Detected Not Detected       Imaging  ECG 12 lead  Normal sinus rhythm  Minimal voltage criteria for LVH, may be normal variant  Borderline ECG  When compared with ECG of 10-MAY-2024 11:26, (unconfirmed)  No significant change was found  ECG 12 lead  Normal sinus rhythm  Minimal voltage criteria for LVH, may be normal variant  Borderline ECG  When compared with ECG of 25-APR-2024 08:42,  No significant change was found  CT head wo IV contrast, CT cervical spine wo IV contrast  Narrative: Interpreted By:  Corey Huggins,   STUDY:  CT HEAD WO IV CONTRAST; CT CERVICAL SPINE WO IV CONTRAST;  5/10/2024  11:58 am      INDICATION:  Signs/Symptoms:fall, confusion; Signs/Symptoms:fall, weakness.      COMPARISON:  None.      ACCESSION NUMBER(S):  [id number]; [id number]      ORDERING CLINICIAN:  DARIEN MCDONOUGH      TECHNIQUE:  Noncontrast axial CT scan of head and cervical spine was performed.  Angled reformats in brain and bone windows were generated. The images  were reviewed in bone, brain, blood and soft tissue windows.      FINDINGS:  Head:      CSF Spaces: The ventricles, sulci and basal cisterns are within  normal limits. There is no extraaxial fluid collection.      Parenchyma:  The grey-white differentiation is intact. There is no  mass effect or midline shift.  There is no intracranial hemorrhage.  Tiny basal ganglia hypodensities potentially remote lacunar infarcts.      Calvarium: No evident fracture. Indolent appearing focal lucency  superior to the right frontal bone measuring 9 mm likely incidental  although attention to this region recommended on clinical assessment  coronal  series 202, image 9.      Paranasal sinuses and mastoids: Minimal mucosal thickening paranasal  sinuses. Chronic appearing bony defect medial wall left maxillary  sinus. The mastoid air cells are well aerated. Probably benign bony  excrescence for of the right frontal sinus coronal series 202, image  13.      Cervical spine:      Alignment: 1-2 mm C2 retrolisthesis, 1-2 mm C7, T1 and T2  anterolisthesis.      Cranial cervical junction: No fracture or subluxation.      Fracture: No acute fracture.      Vertebra and intervertebral disc spaces: Multilevel moderate to  advanced spondylosis and degenerative disc changes with multilevel  canal and foraminal narrowing including prominent canal narrowing at  C2-3 estimated at 8 mm including evident disc herniation canal is  partially obscured due to artifacts especially lower cervical and  upper thoracic region.      Paravertebral soft tissues: No apparent hematoma. Mildly extensive  arterial vascular calcifications.      Impression: No evidence of intracranial hemorrhage or fracture.      Mild multilevel cervical subluxations likely related to degenerative  change with moderate advanced multilevel degenerative changes  contributing to canal and foraminal narrowing.      Atrophy, nonspecific low-density white matter changes and potential  tiny remote basal ganglia lacunar infarcts.      Minimal mucosal thickening paranasal sinuses.      If there is persistent concern for acute cerebral or cervical spine  insult, MRI is recommended.          MACRO:  None          Signed by: Corey Huggins 5/10/2024 12:40 PM  Dictation workstation:   [id number]  XR chest 1 view  Narrative: STUDY:  Chest Radiograph;  5/10/24 at 11:29 AM  INDICATION:  Chest pain.  COMPARISON:  None available.  ACCESSION NUMBER(S):  [id number]  ORDERING CLINICIAN:  DARIEN MCDONOUGH  TECHNIQUE:  Frontal chest was obtained at 1129 hours.  FINDINGS:  CARDIOMEDIASTINAL SILHOUETTE:  Cardiomediastinal silhouette  is normal in size and configuration.     LUNGS:  Small nodular opacities in the right midlung zone measuring 8 to 9 mm.     ABDOMEN:  No remarkable upper abdominal findings.     BONES:  No acute osseous changes.  Impression: 1. No acute process.  2. Small nodular opacities in the right midlung zone measuring 8 to 9  mm. Recommend further evaluation with CT.  Signed by Jose Alberto Taylor MD       Assessment/Plan     68-year-old male initially presenting for sudden onset weakness and associated fall.  Significant history of recent right inguinal hernia repair on 4/20/2024 with Dr. Noe, prostate adenocarcinoma, HTN.  New leukocytosis, with infectious workup unremarkable, surgical site appears clean.  To monitor on telemetry, trending WBC, and pending PT/OT evaluation.    # Generalized weakness  -Sudden onset weakness with no clear etiology at this time.  Infectious workup remains negative, and there are no focal deficits suggestive of intracranial pathology.  -Continue to monitor on telemetry for possible arrhythmia etiology  -Orthostatic vitals  -PT/OT    # Leukocytosis  -Infectious workup negative, afebrile, normotensive.  Suspect reactive in setting of recent surgery and fall  -Continue to trend    # Normocytic anemia  -Review of [facility] labs preop 4/16/2024 Hg 13.6. Initial hemoglobin here 11.7  -Do not suspect acute hemorrhage etiology given normotension. Likely this drop in hemoglobin is from his recent surgery, Will continue to monitor, and if there are concerns for blood loss anemia, will obtain CT imaging    # Elevated bilirubin  -Initial elevated total bilirubin of 1.9, with direct unremarkable.  His preoperative labs on 4/16/2024 had a T. bili of 1.0.  Possibly represents degree of hypovolemia, low suspicion for acute intraabdominal pathology    # Bilateral inguinal hernias s/p repair of right 4/26/2024  -Surgical site appears clean, staples intact, no concern for infection at this time.  Will defer  imaging given low suspicion for deeper wound infection or abscess.    # HTN  -Continue home amlodipine 10mg and losartan 100mg    DVT prophylaxis: Lovenox  Follow-up: Regular  Consults: None    Dispo: Continue to trend leukocytosis for improvement, pending PT/OT evaluation.  Anticipate 1-2 further minutes of hospitalization.    To be staffed with attending,  Lyle Osborn, DO  Internal Medicine PGY-3

## 2024-05-11 ENCOUNTER — APPOINTMENT (OUTPATIENT)
Dept: RADIOLOGY | Facility: HOSPITAL | Age: 69
DRG: 948 | End: 2024-05-11
Payer: MEDICARE

## 2024-05-11 LAB
ALBUMIN SERPL BCP-MCNC: 3.3 G/DL (ref 3.4–5)
ALP SERPL-CCNC: 58 U/L (ref 33–136)
ALT SERPL W P-5'-P-CCNC: 12 U/L (ref 10–52)
ANION GAP SERPL CALC-SCNC: 10 MMOL/L (ref 10–20)
AST SERPL W P-5'-P-CCNC: 17 U/L (ref 9–39)
BASOPHILS # BLD AUTO: 0.02 X10*3/UL (ref 0–0.1)
BASOPHILS NFR BLD AUTO: 0.1 %
BILIRUB DIRECT SERPL-MCNC: 0.1 MG/DL (ref 0–0.3)
BILIRUB SERPL-MCNC: 1.2 MG/DL (ref 0–1.2)
BUN SERPL-MCNC: 21 MG/DL (ref 6–23)
CALCIUM SERPL-MCNC: 8.5 MG/DL (ref 8.6–10.3)
CHLORIDE SERPL-SCNC: 103 MMOL/L (ref 98–107)
CO2 SERPL-SCNC: 29 MMOL/L (ref 21–32)
CREAT SERPL-MCNC: 1.17 MG/DL (ref 0.5–1.3)
CRP SERPL-MCNC: 18.25 MG/DL
EGFRCR SERPLBLD CKD-EPI 2021: 67 ML/MIN/1.73M*2
EOSINOPHIL # BLD AUTO: 0.13 X10*3/UL (ref 0–0.7)
EOSINOPHIL NFR BLD AUTO: 0.9 %
ERYTHROCYTE [DISTWIDTH] IN BLOOD BY AUTOMATED COUNT: 11.9 % (ref 11.5–14.5)
ERYTHROCYTE [DISTWIDTH] IN BLOOD BY AUTOMATED COUNT: 11.9 % (ref 11.5–14.5)
ERYTHROCYTE [SEDIMENTATION RATE] IN BLOOD BY WESTERGREN METHOD: 47 MM/H (ref 0–20)
GLUCOSE SERPL-MCNC: 106 MG/DL (ref 74–99)
HCT VFR BLD AUTO: 31.4 % (ref 41–52)
HCT VFR BLD AUTO: 31.4 % (ref 41–52)
HGB BLD-MCNC: 10.5 G/DL (ref 13.5–17.5)
HGB BLD-MCNC: 10.5 G/DL (ref 13.5–17.5)
HOLD SPECIMEN: NORMAL
IMM GRANULOCYTES # BLD AUTO: 0.08 X10*3/UL (ref 0–0.7)
IMM GRANULOCYTES NFR BLD AUTO: 0.5 % (ref 0–0.9)
LYMPHOCYTES # BLD AUTO: 0.66 X10*3/UL (ref 1.2–4.8)
LYMPHOCYTES NFR BLD AUTO: 4.4 %
MAGNESIUM SERPL-MCNC: 1.83 MG/DL (ref 1.6–2.4)
MCH RBC QN AUTO: 32.1 PG (ref 26–34)
MCH RBC QN AUTO: 32.1 PG (ref 26–34)
MCHC RBC AUTO-ENTMCNC: 33.4 G/DL (ref 32–36)
MCHC RBC AUTO-ENTMCNC: 33.4 G/DL (ref 32–36)
MCV RBC AUTO: 96 FL (ref 80–100)
MCV RBC AUTO: 96 FL (ref 80–100)
MONOCYTES # BLD AUTO: 0.92 X10*3/UL (ref 0.1–1)
MONOCYTES NFR BLD AUTO: 6.1 %
NEUTROPHILS # BLD AUTO: 13.23 X10*3/UL (ref 1.2–7.7)
NEUTROPHILS NFR BLD AUTO: 88 %
NRBC BLD-RTO: 0 /100 WBCS (ref 0–0)
NRBC BLD-RTO: 0 /100 WBCS (ref 0–0)
PHOSPHATE SERPL-MCNC: 2.6 MG/DL (ref 2.5–4.9)
PLATELET # BLD AUTO: 286 X10*3/UL (ref 150–450)
PLATELET # BLD AUTO: 286 X10*3/UL (ref 150–450)
POTASSIUM SERPL-SCNC: 3.4 MMOL/L (ref 3.5–5.3)
PROT SERPL-MCNC: 6.1 G/DL (ref 6.4–8.2)
RBC # BLD AUTO: 3.27 X10*6/UL (ref 4.5–5.9)
RBC # BLD AUTO: 3.27 X10*6/UL (ref 4.5–5.9)
SODIUM SERPL-SCNC: 139 MMOL/L (ref 136–145)
WBC # BLD AUTO: 14.9 X10*3/UL (ref 4.4–11.3)
WBC # BLD AUTO: 14.9 X10*3/UL (ref 4.4–11.3)

## 2024-05-11 PROCEDURE — 2500000001 HC RX 250 WO HCPCS SELF ADMINISTERED DRUGS (ALT 637 FOR MEDICARE OP): Performed by: STUDENT IN AN ORGANIZED HEALTH CARE EDUCATION/TRAINING PROGRAM

## 2024-05-11 PROCEDURE — 85652 RBC SED RATE AUTOMATED: CPT | Performed by: STUDENT IN AN ORGANIZED HEALTH CARE EDUCATION/TRAINING PROGRAM

## 2024-05-11 PROCEDURE — 71250 CT THORAX DX C-: CPT

## 2024-05-11 PROCEDURE — 83735 ASSAY OF MAGNESIUM: CPT | Performed by: STUDENT IN AN ORGANIZED HEALTH CARE EDUCATION/TRAINING PROGRAM

## 2024-05-11 PROCEDURE — 97161 PT EVAL LOW COMPLEX 20 MIN: CPT | Mod: GP

## 2024-05-11 PROCEDURE — 97116 GAIT TRAINING THERAPY: CPT | Mod: GP

## 2024-05-11 PROCEDURE — 84100 ASSAY OF PHOSPHORUS: CPT | Performed by: STUDENT IN AN ORGANIZED HEALTH CARE EDUCATION/TRAINING PROGRAM

## 2024-05-11 PROCEDURE — 99223 1ST HOSP IP/OBS HIGH 75: CPT | Performed by: STUDENT IN AN ORGANIZED HEALTH CARE EDUCATION/TRAINING PROGRAM

## 2024-05-11 PROCEDURE — 2500000006 HC RX 250 W HCPCS SELF ADMINISTERED DRUGS (ALT 637 FOR ALL PAYERS): Mod: MUE | Performed by: STUDENT IN AN ORGANIZED HEALTH CARE EDUCATION/TRAINING PROGRAM

## 2024-05-11 PROCEDURE — 1200000002 HC GENERAL ROOM WITH TELEMETRY DAILY

## 2024-05-11 PROCEDURE — 71250 CT THORAX DX C-: CPT | Performed by: RADIOLOGY

## 2024-05-11 PROCEDURE — 36415 COLL VENOUS BLD VENIPUNCTURE: CPT | Performed by: STUDENT IN AN ORGANIZED HEALTH CARE EDUCATION/TRAINING PROGRAM

## 2024-05-11 PROCEDURE — 85025 COMPLETE CBC W/AUTO DIFF WBC: CPT | Performed by: STUDENT IN AN ORGANIZED HEALTH CARE EDUCATION/TRAINING PROGRAM

## 2024-05-11 PROCEDURE — 2500000004 HC RX 250 GENERAL PHARMACY W/ HCPCS (ALT 636 FOR OP/ED): Performed by: STUDENT IN AN ORGANIZED HEALTH CARE EDUCATION/TRAINING PROGRAM

## 2024-05-11 PROCEDURE — 84075 ASSAY ALKALINE PHOSPHATASE: CPT | Performed by: STUDENT IN AN ORGANIZED HEALTH CARE EDUCATION/TRAINING PROGRAM

## 2024-05-11 PROCEDURE — 86140 C-REACTIVE PROTEIN: CPT | Performed by: STUDENT IN AN ORGANIZED HEALTH CARE EDUCATION/TRAINING PROGRAM

## 2024-05-11 RX ORDER — POTASSIUM CHLORIDE 1.5 G/1.58G
40 POWDER, FOR SOLUTION ORAL ONCE
Status: COMPLETED | OUTPATIENT
Start: 2024-05-11 | End: 2024-05-11

## 2024-05-11 RX ADMIN — AMLODIPINE BESYLATE 10 MG: 10 TABLET ORAL at 21:11

## 2024-05-11 RX ADMIN — LOSARTAN POTASSIUM 100 MG: 100 TABLET, FILM COATED ORAL at 21:11

## 2024-05-11 RX ADMIN — TAMSULOSIN HYDROCHLORIDE 0.4 MG: 0.4 CAPSULE ORAL at 21:10

## 2024-05-11 RX ADMIN — POTASSIUM CHLORIDE 40 MEQ: 1.5 POWDER, FOR SOLUTION ORAL at 10:14

## 2024-05-11 RX ADMIN — ENOXAPARIN SODIUM 40 MG: 40 INJECTION SUBCUTANEOUS at 18:16

## 2024-05-11 ASSESSMENT — PAIN SCALES - GENERAL
PAINLEVEL_OUTOF10: 0 - NO PAIN

## 2024-05-11 ASSESSMENT — COGNITIVE AND FUNCTIONAL STATUS - GENERAL
MOVING FROM LYING ON BACK TO SITTING ON SIDE OF FLAT BED WITH BEDRAILS: A LITTLE
WALKING IN HOSPITAL ROOM: A LITTLE
MOBILITY SCORE: 17
DAILY ACTIVITIY SCORE: 18
CLIMB 3 TO 5 STEPS WITH RAILING: A LOT
HELP NEEDED FOR BATHING: A LITTLE
TURNING FROM BACK TO SIDE WHILE IN FLAT BAD: A LITTLE
MOBILITY SCORE: 17
MOVING FROM LYING ON BACK TO SITTING ON SIDE OF FLAT BED WITH BEDRAILS: A LITTLE
EATING MEALS: A LITTLE
TURNING FROM BACK TO SIDE WHILE IN FLAT BAD: A LITTLE
DRESSING REGULAR UPPER BODY CLOTHING: A LITTLE
WALKING IN HOSPITAL ROOM: A LITTLE
TURNING FROM BACK TO SIDE WHILE IN FLAT BAD: A LITTLE
TOILETING: A LITTLE
MOVING TO AND FROM BED TO CHAIR: A LITTLE
CLIMB 3 TO 5 STEPS WITH RAILING: A LOT
STANDING UP FROM CHAIR USING ARMS: A LITTLE
DRESSING REGULAR LOWER BODY CLOTHING: A LITTLE
STANDING UP FROM CHAIR USING ARMS: A LITTLE
WALKING IN HOSPITAL ROOM: A LITTLE
PERSONAL GROOMING: A LITTLE
MOVING FROM LYING ON BACK TO SITTING ON SIDE OF FLAT BED WITH BEDRAILS: A LITTLE
MOVING TO AND FROM BED TO CHAIR: A LITTLE
MOBILITY SCORE: 17
STANDING UP FROM CHAIR USING ARMS: A LITTLE
MOVING TO AND FROM BED TO CHAIR: A LITTLE
CLIMB 3 TO 5 STEPS WITH RAILING: A LOT

## 2024-05-11 ASSESSMENT — PAIN - FUNCTIONAL ASSESSMENT: PAIN_FUNCTIONAL_ASSESSMENT: 0-10

## 2024-05-11 NOTE — NURSING NOTE
SHIFT NOTE  Uneventful night. VS were stable. External catheter was used. Pt was too weak to stand, so orthostatic BP's were not completed yet. Heart rhythm was NSR on tele. Pt swallowed pills easily. Pt was able to move all extremities. Safety maintained.

## 2024-05-11 NOTE — PROGRESS NOTES
Emanuel Lopez is a 68 y.o. male on day 1 of admission presenting with Weakness.      Subjective   No acute events overnight.  Has remained afebrile and HDS.  Exam at bedside this morning, continues to endorse generalized weakness.  No focal or specifically proximal weakness.  Denies abdominal tenderness or pain.  States he has been eating well and drinking water at home.  Agreeable with rehab placement if necessary.       Objective     Last Recorded Vitals  /87 (BP Location: Left arm, Patient Position: Lying)   Pulse 87   Temp 36.6 °C (97.9 °F) (Temporal)   Resp 16   Wt 77.1 kg (170 lb)   SpO2 99%   Intake/Output last 3 Shifts:    Intake/Output Summary (Last 24 hours) at 5/11/2024 1011  Last data filed at 5/11/2024 0400  Gross per 24 hour   Intake --   Output 550 ml   Net -550 ml       Admission Weight  Weight: 77.1 kg (170 lb) (05/10/24 1006)    Daily Weight  05/10/24 : 77.1 kg (170 lb)      Scheduled medications  amLODIPine, 10 mg, oral, Daily  enoxaparin, 40 mg, subcutaneous, q24h  losartan, 100 mg, oral, Daily  potassium chloride, 40 mEq, oral, Once  tamsulosin, 0.4 mg, oral, Daily      Continuous medications     PRN medications       Physical Exam  Constitutional: Well developed, awake/alert/oriented x4, no distress, alert and cooperative  Skin: Warm and dry, no lesions, no rashes.  Right inguinal surgical site appears clean, no drainage, staples intact  Eyes: Anicteric, clear sclera  ENMT: mucous membranes moist, no apparent injury, no lesions seen  Head/Neck: Atraumatic  Respiratory: Lungs clear to auscultation bilaterally with no wheezes, rhonci or crackles  CV: Regular rate and rhythm, no murmurs or gallops auscultated  GI: Non-tender to deep palpation, no guarding.  No pain on palpation of inguinal repair site  MSK: no joint swelling  Neuro: NIH 0, no focal deficits or loss of sensation  Extremities: normal extremities, no cyanosis edema, contusions or wounds, no clubbing  Psych: Appropriate  mood and behavior    Relevant Results  Results for orders placed or performed during the hospital encounter of 05/10/24 (from the past 24 hour(s))   ECG 12 lead   Result Value Ref Range    Ventricular Rate 98 BPM    Atrial Rate 98 BPM    TN Interval 174 ms    QRS Duration 96 ms    QT Interval 368 ms    QTC Calculation(Bazett) 469 ms    P Axis 68 degrees    R Axis -9 degrees    T Axis 55 degrees    QRS Count 16 beats    Q Onset 219 ms    P Onset 132 ms    P Offset 187 ms    T Offset 403 ms    QTC Fredericia 433 ms   CBC and Auto Differential   Result Value Ref Range    WBC 21.0 (H) 4.4 - 11.3 x10*3/uL    nRBC 0.0 0.0 - 0.0 /100 WBCs    RBC 3.74 (L) 4.50 - 5.90 x10*6/uL    Hemoglobin 11.7 (L) 13.5 - 17.5 g/dL    Hematocrit 35.5 (L) 41.0 - 52.0 %    MCV 95 80 - 100 fL    MCH 31.3 26.0 - 34.0 pg    MCHC 33.0 32.0 - 36.0 g/dL    RDW 11.9 11.5 - 14.5 %    Platelets 317 150 - 450 x10*3/uL    Neutrophils % 91.5 40.0 - 80.0 %    Immature Granulocytes %, Automated 0.7 0.0 - 0.9 %    Lymphocytes % 3.2 13.0 - 44.0 %    Monocytes % 4.5 2.0 - 10.0 %    Eosinophils % 0.0 0.0 - 6.0 %    Basophils % 0.1 0.0 - 2.0 %    Neutrophils Absolute 19.21 (H) 1.20 - 7.70 x10*3/uL    Immature Granulocytes Absolute, Automated 0.15 0.00 - 0.70 x10*3/uL    Lymphocytes Absolute 0.67 (L) 1.20 - 4.80 x10*3/uL    Monocytes Absolute 0.94 0.10 - 1.00 x10*3/uL    Eosinophils Absolute 0.01 0.00 - 0.70 x10*3/uL    Basophils Absolute 0.02 0.00 - 0.10 x10*3/uL   Comprehensive Metabolic Panel   Result Value Ref Range    Glucose 92 74 - 99 mg/dL    Sodium 140 136 - 145 mmol/L    Potassium 3.7 3.5 - 5.3 mmol/L    Chloride 101 98 - 107 mmol/L    Bicarbonate 29 21 - 32 mmol/L    Anion Gap 14 10 - 20 mmol/L    Urea Nitrogen 22 6 - 23 mg/dL    Creatinine 1.12 0.50 - 1.30 mg/dL    eGFR 71 >60 mL/min/1.73m*2    Calcium 9.3 8.6 - 10.3 mg/dL    Albumin 3.9 3.4 - 5.0 g/dL    Alkaline Phosphatase 69 33 - 136 U/L    Total Protein 6.7 6.4 - 8.2 g/dL    AST 19 9 - 39 U/L     Bilirubin, Total 1.9 (H) 0.0 - 1.2 mg/dL    ALT 12 10 - 52 U/L   Magnesium   Result Value Ref Range    Magnesium 1.67 1.60 - 2.40 mg/dL   Troponin I, High Sensitivity, Initial   Result Value Ref Range    Troponin I, High Sensitivity 13 0 - 20 ng/L   ECG 12 lead   Result Value Ref Range    Ventricular Rate 97 BPM    Atrial Rate 97 BPM    IN Interval 180 ms    QRS Duration 92 ms    QT Interval 358 ms    QTC Calculation(Bazett) 454 ms    P Axis 32 degrees    R Axis 11 degrees    T Axis 51 degrees    QRS Count 15 beats    Q Onset 220 ms    P Onset 130 ms    P Offset 183 ms    T Offset 399 ms    QTC Fredericia 420 ms   Troponin, High Sensitivity, 1 Hour   Result Value Ref Range    Troponin I, High Sensitivity 14 0 - 20 ng/L   Bilirubin, Direct   Result Value Ref Range    Bilirubin, Direct 0.1 0.0 - 0.3 mg/dL   Creatine Kinase   Result Value Ref Range    Creatine Kinase 355 (H) 0 - 325 U/L   Urinalysis with Reflex Culture and Microscopic   Result Value Ref Range    Color, Urine Light-Yellow Light-Yellow, Yellow, Dark-Yellow    Appearance, Urine Clear Clear    Specific Gravity, Urine 1.017 1.005 - 1.035    pH, Urine 6.5 5.0, 5.5, 6.0, 6.5, 7.0, 7.5, 8.0    Protein, Urine 20 (TRACE) NEGATIVE, 10 (TRACE), 20 (TRACE) mg/dL    Glucose, Urine Normal Normal mg/dL    Blood, Urine 0.1 (1+) (A) NEGATIVE    Ketones, Urine NEGATIVE NEGATIVE mg/dL    Bilirubin, Urine NEGATIVE NEGATIVE    Urobilinogen, Urine 2 (1+) (A) Normal mg/dL    Nitrite, Urine NEGATIVE NEGATIVE    Leukocyte Esterase, Urine NEGATIVE NEGATIVE   Extra Urine Gray Tube   Result Value Ref Range    Extra Tube Hold for add-ons.    Urinalysis Microscopic   Result Value Ref Range    WBC, Urine NONE 1-5, NONE /HPF    RBC, Urine 1-2 NONE, 1-2, 3-5 /HPF    Mucus, Urine FEW Reference range not established. /LPF   Sars-CoV-2 PCR   Result Value Ref Range    Coronavirus 2019, PCR Not Detected Not Detected   Influenza A, and B PCR   Result Value Ref Range    Flu A Result Not  Detected Not Detected    Flu B Result Not Detected Not Detected   CBC   Result Value Ref Range    WBC 14.9 (H) 4.4 - 11.3 x10*3/uL    nRBC 0.0 0.0 - 0.0 /100 WBCs    RBC 3.27 (L) 4.50 - 5.90 x10*6/uL    Hemoglobin 10.5 (L) 13.5 - 17.5 g/dL    Hematocrit 31.4 (L) 41.0 - 52.0 %    MCV 96 80 - 100 fL    MCH 32.1 26.0 - 34.0 pg    MCHC 33.4 32.0 - 36.0 g/dL    RDW 11.9 11.5 - 14.5 %    Platelets 286 150 - 450 x10*3/uL   C-reactive protein   Result Value Ref Range    C-Reactive Protein 18.25 (H) <1.00 mg/dL   Sedimentation rate, automated   Result Value Ref Range    Sedimentation Rate 47 (H) 0 - 20 mm/h   Hepatic function panel   Result Value Ref Range    Albumin 3.3 (L) 3.4 - 5.0 g/dL    Bilirubin, Total 1.2 0.0 - 1.2 mg/dL    Bilirubin, Direct 0.1 0.0 - 0.3 mg/dL    Alkaline Phosphatase 58 33 - 136 U/L    ALT 12 10 - 52 U/L    AST 17 9 - 39 U/L    Total Protein 6.1 (L) 6.4 - 8.2 g/dL   Basic Metabolic Panel   Result Value Ref Range    Glucose 106 (H) 74 - 99 mg/dL    Sodium 139 136 - 145 mmol/L    Potassium 3.4 (L) 3.5 - 5.3 mmol/L    Chloride 103 98 - 107 mmol/L    Bicarbonate 29 21 - 32 mmol/L    Anion Gap 10 10 - 20 mmol/L    Urea Nitrogen 21 6 - 23 mg/dL    Creatinine 1.17 0.50 - 1.30 mg/dL    eGFR 67 >60 mL/min/1.73m*2    Calcium 8.5 (L) 8.6 - 10.3 mg/dL   Phosphorus   Result Value Ref Range    Phosphorus 2.6 2.5 - 4.9 mg/dL   CBC and Auto Differential   Result Value Ref Range    WBC 14.9 (H) 4.4 - 11.3 x10*3/uL    nRBC 0.0 0.0 - 0.0 /100 WBCs    RBC 3.27 (L) 4.50 - 5.90 x10*6/uL    Hemoglobin 10.5 (L) 13.5 - 17.5 g/dL    Hematocrit 31.4 (L) 41.0 - 52.0 %    MCV 96 80 - 100 fL    MCH 32.1 26.0 - 34.0 pg    MCHC 33.4 32.0 - 36.0 g/dL    RDW 11.9 11.5 - 14.5 %    Platelets 286 150 - 450 x10*3/uL    Neutrophils % 88.0 40.0 - 80.0 %    Immature Granulocytes %, Automated 0.5 0.0 - 0.9 %    Lymphocytes % 4.4 13.0 - 44.0 %    Monocytes % 6.1 2.0 - 10.0 %    Eosinophils % 0.9 0.0 - 6.0 %    Basophils % 0.1 0.0 - 2.0 %     Neutrophils Absolute 13.23 (H) 1.20 - 7.70 x10*3/uL    Immature Granulocytes Absolute, Automated 0.08 0.00 - 0.70 x10*3/uL    Lymphocytes Absolute 0.66 (L) 1.20 - 4.80 x10*3/uL    Monocytes Absolute 0.92 0.10 - 1.00 x10*3/uL    Eosinophils Absolute 0.13 0.00 - 0.70 x10*3/uL    Basophils Absolute 0.02 0.00 - 0.10 x10*3/uL        Image Results  ECG 12 lead  Normal sinus rhythm  Minimal voltage criteria for LVH, may be normal variant  Borderline ECG  When compared with ECG of 10-MAY-2024 11:26, (unconfirmed)  No significant change was found  ECG 12 lead  Normal sinus rhythm  Minimal voltage criteria for LVH, may be normal variant  Borderline ECG  When compared with ECG of 25-APR-2024 08:42,  No significant change was found  CT head wo IV contrast, CT cervical spine wo IV contrast  Narrative: Interpreted By:  Corey Huggins,   STUDY:  CT HEAD WO IV CONTRAST; CT CERVICAL SPINE WO IV CONTRAST;  5/10/2024  11:58 am      INDICATION:  Signs/Symptoms:fall, confusion; Signs/Symptoms:fall, weakness.      COMPARISON:  None.      ACCESSION NUMBER(S):  [id number]; [id number]      ORDERING CLINICIAN:  DARIEN MCDONOUGH      TECHNIQUE:  Noncontrast axial CT scan of head and cervical spine was performed.  Angled reformats in brain and bone windows were generated. The images  were reviewed in bone, brain, blood and soft tissue windows.      FINDINGS:  Head:      CSF Spaces: The ventricles, sulci and basal cisterns are within  normal limits. There is no extraaxial fluid collection.      Parenchyma:  The grey-white differentiation is intact. There is no  mass effect or midline shift.  There is no intracranial hemorrhage.  Tiny basal ganglia hypodensities potentially remote lacunar infarcts.      Calvarium: No evident fracture. Indolent appearing focal lucency  superior to the right frontal bone measuring 9 mm likely incidental  although attention to this region recommended on clinical assessment  coronal series 202, image 9.       Paranasal sinuses and mastoids: Minimal mucosal thickening paranasal  sinuses. Chronic appearing bony defect medial wall left maxillary  sinus. The mastoid air cells are well aerated. Probably benign bony  excrescence for of the right frontal sinus coronal series 202, image  13.      Cervical spine:      Alignment: 1-2 mm C2 retrolisthesis, 1-2 mm C7, T1 and T2  anterolisthesis.      Cranial cervical junction: No fracture or subluxation.      Fracture: No acute fracture.      Vertebra and intervertebral disc spaces: Multilevel moderate to  advanced spondylosis and degenerative disc changes with multilevel  canal and foraminal narrowing including prominent canal narrowing at  C2-3 estimated at 8 mm including evident disc herniation canal is  partially obscured due to artifacts especially lower cervical and  upper thoracic region.      Paravertebral soft tissues: No apparent hematoma. Mildly extensive  arterial vascular calcifications.      Impression: No evidence of intracranial hemorrhage or fracture.      Mild multilevel cervical subluxations likely related to degenerative  change with moderate advanced multilevel degenerative changes  contributing to canal and foraminal narrowing.      Atrophy, nonspecific low-density white matter changes and potential  tiny remote basal ganglia lacunar infarcts.      Minimal mucosal thickening paranasal sinuses.      If there is persistent concern for acute cerebral or cervical spine  insult, MRI is recommended.          MACRO:  None          Signed by: Corey Huggins 5/10/2024 12:40 PM  Dictation workstation:   [id number]  XR chest 1 view  Narrative: STUDY:  Chest Radiograph;  5/10/24 at 11:29 AM  INDICATION:  Chest pain.  COMPARISON:  None available.  ACCESSION NUMBER(S):  [id number]  ORDERING CLINICIAN:  DARIEN MCDONOUGH  TECHNIQUE:  Frontal chest was obtained at 1129 hours.  FINDINGS:  CARDIOMEDIASTINAL SILHOUETTE:  Cardiomediastinal silhouette is normal in size and  configuration.     LUNGS:  Small nodular opacities in the right midlung zone measuring 8 to 9 mm.     ABDOMEN:  No remarkable upper abdominal findings.     BONES:  No acute osseous changes.  Impression: 1. No acute process.  2. Small nodular opacities in the right midlung zone measuring 8 to 9  mm. Recommend further evaluation with CT.  Signed by Jose Alberto Taylor MD           Assessment/Plan   68-year-old male initially presenting for sudden onset weakness and associated fall.  Significant history of recent right inguinal hernia repair on 4/20/2024 with Dr. Noe, prostate adenocarcinoma, HTN.  New leukocytosis, with infectious workup unremarkable thus far, surgical site appears clean.  To monitor on telemetry, trending WBC, and pending PT/OT evaluation.     # Generalized weakness  -Sudden onset weakness with no clear etiology at this time.  Infectious workup remains negative, and there are no focal deficits suggestive of intracranial pathology.  -Continue to monitor on telemetry for possible arrhythmia etiology  -Orthostatic vitals  -PT/OT     # Leukocytosis -improving  -Infectious workup negative, afebrile, normotensive.  Suspect reactive in setting of recent surgery and fall. There is no concern for sepsis at this time.  -Continue to trend, currently downtrending  -CRP elevated at 18 today, although suspect this is reactive to his fall. Given the right mid-lung opacities on CXR, will obtain CT chest non-contrast for further investigation of possible pneumonia although patient has no clinical symptoms suggestive of this to require empiric coverage. Low suspicion for autoimmune etiology such as dermatomyositis given his relatively low CK     # Normocytic anemia  -Review of [facility] labs preop 4/16/2024 Hg 13.6. Initial hemoglobin here 11.7, now trending to 10.5  -Do not suspect acute hemorrhage etiology given normotension. Likely this drop in hemoglobin is from his recent surgery.     # Elevated bilirubin,  resolved  -Initial elevated total bilirubin now normalized today, likely dehydration     # Bilateral inguinal hernias s/p repair of right 4/26/2024  -Surgical site appears clean, staples intact, no concern for infection at this time.  Will defer abdominal imaging given low suspicion for deeper wound infection or abscess.     # HTN  -Continue home amlodipine 10mg and losartan 100mg     DVT prophylaxis: Lovenox  Follow-up: Regular  Consults: None     Dispo: Leukocytosis improving, although patient continues to be weak.  Pending PT/OT evaluation, anticipate he will require SNF or acute rehab placement.  Anticipate 2+ further midnights of hospitalization.         To be staffed with attending,  Lyle Osborn, DO  Internal Medicine PGY-3

## 2024-05-11 NOTE — PROGRESS NOTES
Physical Therapy    Physical Therapy Evaluation    Patient Name: Emanuel Lopez  MRN: [id number]  Today's Date: 5/11/2024   Time Calculation  Start Time: 0936  Stop Time: 1006  Time Calculation (min): 30 min    Assessment/Plan   PT Assessment  PT Assessment Results: Decreased mobility, Decreased strength  Rehab Prognosis: Good  Evaluation/Treatment Tolerance: Patient tolerated treatment well  Medical Staff Made Aware: Yes  Strengths: Ability to acquire knowledge, Attitude of self  End of Session Communication: Bedside nurse  Assessment Comment: expect gradual progress toward strengthening and gait.  End of Session Patient Position: Up in chair, Alarm on  IP OR SWING BED PT PLAN  Inpatient or Swing Bed: Inpatient  PT Plan  Treatment/Interventions: Bed mobility, Transfer training, Gait training, Stair training, Strengthening, Endurance training, Therapeutic exercise, Therapeutic activity, Home exercise program (Issued a handout on 3 le exercises, a/p, qs, and gs.)  PT Frequency: 4 times per week  PT Discharge Recommendations: Low intensity level of continued care  Equipment Recommended upon Discharge: Wheeled walker, Straight cane  PT Recommended Transfer Status: Assist x1  PT - OK to Discharge: Yes (When medically allowed.)    Subjective     Current Problem:  Patient Active Problem List   Diagnosis    Non-recurrent unilateral inguinal hernia without obstruction or gangrene    Unilateral inguinal hernia without obstruction or gangrene    Weakness       General Visit Information:  General  Reason for Referral: Weakness / Impaired mobility, gait.  Referred By: NETO Stephens MD  Past Medical History Relevant to Rehab: s/p hernia repair 4/20/24; drain removed 4/30/24.  Prostae ca, HTN, anemia, prostate biopsy.  Family/Caregiver Present: No  Prior to Session Communication: Bedside nurse  Patient Position Received: Bed, 3 rail up, Alarm on  Preferred Learning Style: verbal, visual, written  General Comment: CT of head  and c-spine was neg..  elevated WBC's.  CXR saw R mid-lung nodular opacities. (has a pure-wick.)    Home Living:  Home Living  Type of Home: House  Lives With: Adult children (w/ dtr. and S-I-L.)  Home Adaptive Equipment: Cane, Walker rolling or standard  Home Layout: Two level  Home Access: Stairs to enter with rails  Entrance Stairs-Number of Steps: 5  Bathroom Shower/Tub: Walk-in shower  Bathroom Equipment: Grab bars in shower  Home Living Comments: BED/BATH are up 13 stairs with a railing.    Prior Level of Function:  Prior Function Per Pt/Caregiver Report  Level of [place]: Independent with homemaking with ambulation  Ambulatory Assistance: Independent  Vocational: Retired  Prior Function Comments: Cane ambs. at home.    Precautions:  Precautions  LE Weight Bearing Status: Weight Bearing as Tolerated  Medical Precautions: Fall precautions    Vital Signs:     Objective     Pain:  Pain Assessment  Pain Score: 0 - No pain    Cognition:  Cognition  Overall Cognitive Status: Within Functional Limits  Orientation Level: Oriented X4    General Assessments:  General Observation  General Observation: pleasant, cooperative.  Encouraged to use his walker at home.   Activity Tolerance  Endurance: Endurance does not limit participation in activity  Sensation  Light Touch: No apparent deficits  Strength  Strength Comments: 5/5= b/l Quads. and a. tibs..     Coordination  Movements are Fluid and Coordinated: Yes  Postural Control  Postural Control: Within Functional Limits  Posture Comment: mildly flexed over the walker.  will need walker adjusted up 1-2 notches for his 6'00'' ht..          Functional Assessments:     Bed Mobility  Bed Mobility: Yes  Bed Mobility 1  Bed Mobility 1: Supine to sitting  Level of Assistance 1: Contact guard  Transfers  Transfer: Yes  Transfer 1  Transfer From 1: Sit to, Stand to  Transfer Device 1: Walker  Transfer Level of Assistance 1: Minimum assistance (x 1.)  Ambulation/Gait  Training  Ambulation/Gait Training Performed: Yes  Ambulation/Gait Training 1  Surface 1: Level tile  Device 1: Rolling walker  Gait Support Devices: Gait belt  Assistance 1: Contact guard  Quality of Gait 1: Shuffling gait  Comments/Distance (ft) 1: x 50 ft.  Stairs  Stairs: No       Extremity/Trunk Assessments:        RLE   RLE : Within Functional Limits  LLE   LLE : Within Functional Limits    Outcome Measures:  [facility] Basic Mobility  Turning from your back to your side while in a flat bed without using bedrails: A little  Moving from lying on your back to sitting on the side of a flat bed without using bedrails: A little  Moving to and from bed to chair (including a wheelchair): A little  Standing up from a chair using your arms (e.g. wheelchair or bedside chair): A little  To walk in hospital room: A little  Climbing 3-5 steps with railing: A lot  Basic Mobility - Total Score: 17                            Goals:  Encounter Problems       Encounter Problems (Active)       Mobility       STG - Patient will ambulate x 100 ft. or more with w/w, SBA.   (Progressing)       Start:  05/11/24    Expected End:  05/25/24            STG - Patient will ascend and descend four to six stairs WITH RAIL AND A CANE, cga. (Progressing)       Start:  05/11/24    Expected End:  05/25/24               PT Transfers       STG - Patient to transfer to and from sit to supine WITH Modif. Indep..   (Progressing)       Start:  05/11/24    Expected End:  05/25/24            STG - Patient will transfer sit to and from stand into a w/w with Modif. Indep..   (Progressing)       Start:  05/11/24    Expected End:  05/25/24               Pain - Adult          Safety       STG - Patient uses gait belt during all transfers AND AMB. TRNG..   (Progressing)       Start:  05/11/24    Expected End:  05/25/24                 Education Documentation  Handouts, taught by Kevin Manning, PT at 5/11/2024  5:02 PM.  Learner: Patient  Readiness:  Acceptance  Method: Demonstration  Response: Demonstrated Understanding, Needs Reinforcement    Body Mechanics, taught by Kevin Manning, PT at 5/11/2024  5:02 PM.  Learner: Patient  Readiness: Acceptance  Method: Demonstration  Response: Demonstrated Understanding, Needs Reinforcement    Home Exercise Program, taught by Kevin Manning, PT at 5/11/2024  5:02 PM.  Learner: Patient  Readiness: Acceptance  Method: Demonstration  Response: Demonstrated Understanding, Needs Reinforcement    Mobility Training, taught by Kevin Manning, PT at 5/11/2024  5:02 PM.  Learner: Patient  Readiness: Acceptance  Method: Demonstration  Response: Demonstrated Understanding, Needs Reinforcement    Education Comments  No comments found.

## 2024-05-11 NOTE — NURSING NOTE
Patient was up to the chair with assist x1 walker.  He denies dizziness when up.  Staples to hernia incision will remain in place and follow up with surgery as outpatient for removal.  Staples are ANUSHKA to right groin.  CT scan of chest completed this evening.

## 2024-05-11 NOTE — CARE PLAN
The patient's goals for the shift include      The clinical goals for the shift include get aclamated to the room      Problem: Pain - Adult  Goal: Verbalizes/displays adequate comfort level or baseline comfort level  5/11/2024 0136 by Pat Landis RN  Outcome: Progressing  5/11/2024 0135 by Pat Landis RN  Outcome: Progressing     Problem: Safety - Adult  Goal: Free from fall injury  5/11/2024 0136 by Pat Landis RN  Outcome: Progressing  5/11/2024 0135 by Pat Landis RN  Outcome: Progressing     Problem: Discharge Planning  Goal: Discharge to home or other facility with appropriate resources  5/11/2024 0136 by Pat Landis RN  Outcome: Progressing  5/11/2024 0135 by Pat Landis RN  Outcome: Progressing     Problem: Chronic Conditions and Co-morbidities  Goal: Patient's chronic conditions and co-morbidity symptoms are monitored and maintained or improved  5/11/2024 0136 by Pat Landis RN  Outcome: Progressing  5/11/2024 0135 by Pat Landis RN  Outcome: Progressing     Problem: Skin  Goal: Prevent/manage excess moisture  Outcome: Progressing  Flowsheets (Taken 5/11/2024 0136)  Prevent/manage excess moisture: Monitor for/manage infection if present  Goal: Prevent/minimize sheer/friction injuries  Outcome: Progressing  Flowsheets (Taken 5/11/2024 0136)  Prevent/minimize sheer/friction injuries:   HOB 30 degrees or less   Complete micro-shifts as needed if patient unable. Adjust patient position to relieve pressure points, not a full turn  Goal: Promote/optimize nutrition  Outcome: Progressing  Goal: Promote skin healing  Outcome: Progressing  Flowsheets (Taken 5/11/2024 0136)  Promote skin healing:   Turn/reposition every 2 hours/use positioning/transfer devices   Assess skin/pad under line(s)/device(s)   Protective dressings over bony prominences

## 2024-05-11 NOTE — PROGRESS NOTES
Nutrition Initial Assessment:   Nutrition Assessment    Reason for Assessment: Admission nursing screening    Patient is a 68 y.o. male presenting from home with weakness and fall. PMH includes prostate adenocarcinoma, HTN. Of note pt had recent right inguinal hernia repair on 4/20/24.    Past Medical History:   Diagnosis Date    Anemia     BPH (benign prostatic hyperplasia)     Hypertension     Inguinal hernia of left side without obstruction or gangrene     Prostate cancer (Multi)           Nutrition History:  Energy Intake: Good > 75 %  Food and Nutrient History: Pt reports he lives with his daughter who typically prepares his meals. Reports his usual appetite is good. Consumes 3 meals/day. States he does drink chocolate Boost at home when it is available. States he did not eat much for breakfast. Lunch tray on bedisde table during assessment, consumed 100% meatloaf, noodles, vegetables, and pie.  Food Allergies/Intolerances:  None  GI Symptoms: None  Oral Problems: None       Anthropometrics:  Height: 182.9 cm (6')   Weight: 77.1 kg (170 lb)   BMI (Calculated): 23.05  IBW/kg (Dietitian Calculated): 80.74 kg  Percent of IBW: 95.51 %       Weight History:   Wt Readings from Last 10 Encounters:   05/10/24 77.1 kg (170 lb)   04/26/24 79.4 kg (175 lb)   04/25/24 79.6 kg (175 lb 7.8 oz)   04/18/24 78.6 kg (173 lb 3.2 oz)   04/16/24 77.1 kg (170 lb)     Weight Change %:  Weight History / % Weight Change: Believes his weight is stable around 170#  Significant Weight Loss: No    Nutrition Focused Physical Exam Findings:    Subcutaneous Fat Loss:   Buccal Fat Pads: Mild-Moderate (flat cheeks, minimal bounce)  Triceps: Well nourished (ample fat tissue)  Muscle Wasting:  Temporalis: Mild-Moderate (slight depression)  Pectoralis (Clavicular Region): Mild-Moderate (some protrusion of clavicle)  Deltoid/Trapezius: Defer  Interosseous: Mild-Moderate (slightly depressed area between thumb and  forefinger)  Trapezius/Infraspinatus/Supraspinatus (Scapular Region): Defer  Gastrocnemius: Well nourished (well developed bulbous muscle)  Edema:  Edema: none  Physical Findings:  Skin: Negative    Nutrition Significant Labs:  CBC Trend:   Results from last 7 days   Lab Units 05/11/24  0654 05/10/24  1139   WBC AUTO x10*3/uL 14.9*  14.9* 21.0*   RBC AUTO x10*6/uL 3.27*  3.27* 3.74*   HEMOGLOBIN g/dL 10.5*  10.5* 11.7*   HEMATOCRIT % 31.4*  31.4* 35.5*   MCV fL 96  96 95   PLATELETS AUTO x10*3/uL 286  286 317    , BMP Trend:   Results from last 7 days   Lab Units 05/11/24  0654 05/10/24  1139   GLUCOSE mg/dL 106* 92   CALCIUM mg/dL 8.5* 9.3   SODIUM mmol/L 139 140   POTASSIUM mmol/L 3.4* 3.7   CO2 mmol/L 29 29   CHLORIDE mmol/L 103 101   BUN mg/dL 21 22   CREATININE mg/dL 1.17 1.12        Nutrition Specific Medications:  Scheduled medications  amLODIPine, 10 mg, oral, Daily  enoxaparin, 40 mg, subcutaneous, q24h  losartan, 100 mg, oral, Daily  tamsulosin, 0.4 mg, oral, Daily      Continuous medications     PRN medications        I/O:   Last BM Date: 05/10/24;      Dietary Orders (From admission, onward)       Start     Ordered    05/10/24 1642  Adult diet Regular  Diet effective now        Question:  Diet type  Answer:  Regular    05/10/24 1642                     Estimated Needs:   Total Energy Estimated Needs (kCal):  (4792-9734 kcal)  Method for Estimating Needs: 25-30 kcal/kg  Total Protein Estimated Needs (g):  (77-92g)  Method for Estimating Needs: 1.0-1.2g/kg     Method for Estimating Needs: 1mL /kcal or per physician        Nutrition Diagnosis        Nutrition Diagnosis  Patient has Nutrition Diagnosis: Yes  Nutrition Diagnosis 1: Increased nutrient needs  Related to (1): acute nutritional stress  As Evidenced by (1): current clinical status       Nutrition Interventions/Recommendations         Nutrition Prescription:  Individualized Nutrition Prescription Provided for : Regular Diet        Nutrition  Interventions:   Interventions: Meals and snacks, Medical food supplement  Meals and Snacks: General healthful diet  Goal: Consume >75% of meals  Medical Food Supplement: Commercial beverage  Goal: Ensure Plus High Protein with lunch and dinner (350kcal, 20g pro), prefers chocolate    Collaboration and Referral of Nutrition Care: Collaboration by nutrition professional with other providers  Goal: Elmira MARIN    Nutrition Education:          Nutrition Monitoring and Evaluation   Food/Nutrient Related History Monitoring  Monitoring and Evaluation Plan: Energy intake, Amount of food  Energy Intake: Estimated energy intake  Amount of Food: Estimated amout of food, Medical food intake  Criteria: Consume >75% of meals and supplement    Body Composition/Growth/Weight History  Monitoring and Evaluation Plan: Weight  Weight: Measured weight  Criteria: Maintain stable weight                 Time Spent/Follow-up Reminder:   Time Spent (min): 45 minutes  Last Date of Nutrition Visit: 05/11/24  Nutrition Follow-Up Needed?: 5-7 days  Follow up Comment: AM

## 2024-05-12 LAB
ALBUMIN SERPL BCP-MCNC: 3.2 G/DL (ref 3.4–5)
ANION GAP SERPL CALC-SCNC: 13 MMOL/L (ref 10–20)
BASOPHILS # BLD AUTO: 0.01 X10*3/UL (ref 0–0.1)
BASOPHILS NFR BLD AUTO: 0.1 %
BUN SERPL-MCNC: 21 MG/DL (ref 6–23)
CALCIUM SERPL-MCNC: 8.5 MG/DL (ref 8.6–10.3)
CHLORIDE SERPL-SCNC: 104 MMOL/L (ref 98–107)
CO2 SERPL-SCNC: 27 MMOL/L (ref 21–32)
CREAT SERPL-MCNC: 1.04 MG/DL (ref 0.5–1.3)
EGFRCR SERPLBLD CKD-EPI 2021: 78 ML/MIN/1.73M*2
EOSINOPHIL # BLD AUTO: 0.18 X10*3/UL (ref 0–0.7)
EOSINOPHIL NFR BLD AUTO: 1.8 %
ERYTHROCYTE [DISTWIDTH] IN BLOOD BY AUTOMATED COUNT: 11.9 % (ref 11.5–14.5)
ERYTHROCYTE [DISTWIDTH] IN BLOOD BY AUTOMATED COUNT: 11.9 % (ref 11.5–14.5)
GLUCOSE SERPL-MCNC: 105 MG/DL (ref 74–99)
HCT VFR BLD AUTO: 31.7 % (ref 41–52)
HCT VFR BLD AUTO: 31.7 % (ref 41–52)
HGB BLD-MCNC: 10.3 G/DL (ref 13.5–17.5)
HGB BLD-MCNC: 10.3 G/DL (ref 13.5–17.5)
IMM GRANULOCYTES # BLD AUTO: 0.04 X10*3/UL (ref 0–0.7)
IMM GRANULOCYTES NFR BLD AUTO: 0.4 % (ref 0–0.9)
LYMPHOCYTES # BLD AUTO: 0.87 X10*3/UL (ref 1.2–4.8)
LYMPHOCYTES NFR BLD AUTO: 8.5 %
MCH RBC QN AUTO: 30.7 PG (ref 26–34)
MCH RBC QN AUTO: 30.7 PG (ref 26–34)
MCHC RBC AUTO-ENTMCNC: 32.5 G/DL (ref 32–36)
MCHC RBC AUTO-ENTMCNC: 32.5 G/DL (ref 32–36)
MCV RBC AUTO: 95 FL (ref 80–100)
MCV RBC AUTO: 95 FL (ref 80–100)
MONOCYTES # BLD AUTO: 0.72 X10*3/UL (ref 0.1–1)
MONOCYTES NFR BLD AUTO: 7 %
NEUTROPHILS # BLD AUTO: 8.44 X10*3/UL (ref 1.2–7.7)
NEUTROPHILS NFR BLD AUTO: 82.2 %
NRBC BLD-RTO: 0 /100 WBCS (ref 0–0)
NRBC BLD-RTO: 0 /100 WBCS (ref 0–0)
PHOSPHATE SERPL-MCNC: 3 MG/DL (ref 2.5–4.9)
PLATELET # BLD AUTO: 295 X10*3/UL (ref 150–450)
PLATELET # BLD AUTO: 295 X10*3/UL (ref 150–450)
POTASSIUM SERPL-SCNC: 3.7 MMOL/L (ref 3.5–5.3)
RBC # BLD AUTO: 3.35 X10*6/UL (ref 4.5–5.9)
RBC # BLD AUTO: 3.35 X10*6/UL (ref 4.5–5.9)
SODIUM SERPL-SCNC: 140 MMOL/L (ref 136–145)
WBC # BLD AUTO: 10.5 X10*3/UL (ref 4.4–11.3)
WBC # BLD AUTO: 10.5 X10*3/UL (ref 4.4–11.3)

## 2024-05-12 PROCEDURE — 85025 COMPLETE CBC W/AUTO DIFF WBC: CPT | Performed by: STUDENT IN AN ORGANIZED HEALTH CARE EDUCATION/TRAINING PROGRAM

## 2024-05-12 PROCEDURE — 99232 SBSQ HOSP IP/OBS MODERATE 35: CPT

## 2024-05-12 PROCEDURE — 1200000002 HC GENERAL ROOM WITH TELEMETRY DAILY

## 2024-05-12 PROCEDURE — 2500000006 HC RX 250 W HCPCS SELF ADMINISTERED DRUGS (ALT 637 FOR ALL PAYERS): Performed by: STUDENT IN AN ORGANIZED HEALTH CARE EDUCATION/TRAINING PROGRAM

## 2024-05-12 PROCEDURE — 97116 GAIT TRAINING THERAPY: CPT | Mod: GP

## 2024-05-12 PROCEDURE — 2500000004 HC RX 250 GENERAL PHARMACY W/ HCPCS (ALT 636 FOR OP/ED): Performed by: STUDENT IN AN ORGANIZED HEALTH CARE EDUCATION/TRAINING PROGRAM

## 2024-05-12 PROCEDURE — 97165 OT EVAL LOW COMPLEX 30 MIN: CPT | Mod: GO | Performed by: OCCUPATIONAL THERAPIST

## 2024-05-12 PROCEDURE — 80069 RENAL FUNCTION PANEL: CPT | Performed by: STUDENT IN AN ORGANIZED HEALTH CARE EDUCATION/TRAINING PROGRAM

## 2024-05-12 PROCEDURE — 2500000001 HC RX 250 WO HCPCS SELF ADMINISTERED DRUGS (ALT 637 FOR MEDICARE OP): Performed by: STUDENT IN AN ORGANIZED HEALTH CARE EDUCATION/TRAINING PROGRAM

## 2024-05-12 PROCEDURE — 36415 COLL VENOUS BLD VENIPUNCTURE: CPT | Performed by: STUDENT IN AN ORGANIZED HEALTH CARE EDUCATION/TRAINING PROGRAM

## 2024-05-12 RX ADMIN — TAMSULOSIN HYDROCHLORIDE 0.4 MG: 0.4 CAPSULE ORAL at 21:13

## 2024-05-12 RX ADMIN — LOSARTAN POTASSIUM 100 MG: 100 TABLET, FILM COATED ORAL at 21:13

## 2024-05-12 RX ADMIN — AMLODIPINE BESYLATE 10 MG: 10 TABLET ORAL at 21:13

## 2024-05-12 RX ADMIN — ENOXAPARIN SODIUM 40 MG: 40 INJECTION SUBCUTANEOUS at 16:10

## 2024-05-12 ASSESSMENT — PAIN - FUNCTIONAL ASSESSMENT
PAIN_FUNCTIONAL_ASSESSMENT: 0-10

## 2024-05-12 ASSESSMENT — COGNITIVE AND FUNCTIONAL STATUS - GENERAL
CLIMB 3 TO 5 STEPS WITH RAILING: A LOT
DRESSING REGULAR LOWER BODY CLOTHING: A LOT
TOILETING: A LITTLE
STANDING UP FROM CHAIR USING ARMS: A LITTLE
DRESSING REGULAR LOWER BODY CLOTHING: A LITTLE
TURNING FROM BACK TO SIDE WHILE IN FLAT BAD: A LOT
STANDING UP FROM CHAIR USING ARMS: A LOT
TOILETING: A LITTLE
PERSONAL GROOMING: A LITTLE
WALKING IN HOSPITAL ROOM: A LITTLE
DAILY ACTIVITIY SCORE: 18
MOVING TO AND FROM BED TO CHAIR: A LITTLE
MOVING FROM LYING ON BACK TO SITTING ON SIDE OF FLAT BED WITH BEDRAILS: A LITTLE
EATING MEALS: A LITTLE
CLIMB 3 TO 5 STEPS WITH RAILING: A LOT
TURNING FROM BACK TO SIDE WHILE IN FLAT BAD: A LITTLE
DRESSING REGULAR UPPER BODY CLOTHING: A LITTLE
CLIMB 3 TO 5 STEPS WITH RAILING: A LOT
MOVING TO AND FROM BED TO CHAIR: A LOT
TURNING FROM BACK TO SIDE WHILE IN FLAT BAD: A LOT
DAILY ACTIVITIY SCORE: 18
WALKING IN HOSPITAL ROOM: A LITTLE
DRESSING REGULAR UPPER BODY CLOTHING: A LITTLE
DAILY ACTIVITIY SCORE: 18
MOBILITY SCORE: 17
MOVING FROM LYING ON BACK TO SITTING ON SIDE OF FLAT BED WITH BEDRAILS: A LITTLE
DRESSING REGULAR UPPER BODY CLOTHING: A LITTLE
HELP NEEDED FOR BATHING: A LITTLE
WALKING IN HOSPITAL ROOM: A LITTLE
STANDING UP FROM CHAIR USING ARMS: A LOT
DRESSING REGULAR LOWER BODY CLOTHING: A LOT
MOBILITY SCORE: 14
HELP NEEDED FOR BATHING: A LOT
HELP NEEDED FOR BATHING: A LOT
MOBILITY SCORE: 14
MOVING TO AND FROM BED TO CHAIR: A LOT
TOILETING: A LITTLE
MOVING FROM LYING ON BACK TO SITTING ON SIDE OF FLAT BED WITH BEDRAILS: A LITTLE

## 2024-05-12 ASSESSMENT — PAIN SCALES - GENERAL
PAINLEVEL_OUTOF10: 0 - NO PAIN

## 2024-05-12 ASSESSMENT — ACTIVITIES OF DAILY LIVING (ADL): LACK_OF_TRANSPORTATION: NO

## 2024-05-12 NOTE — PROGRESS NOTES
Emanuel Lopez is a 68 y.o. male on day 2 of admission presenting with Weakness.      Subjective   Patient feels otherwise well was seen by PT OT reviewed the recommendation.  No overnight events.  Conversation was had with Dr. Noe the surgeon he indicated that we can remove the staples order was placed for nursing staff to remove the staples.       Objective     Last Recorded Vitals  /90   Pulse 80   Temp 36.2 °C (97.2 °F)   Resp 16   Wt 77.1 kg (170 lb)   SpO2 100%   Intake/Output last 3 Shifts:    Intake/Output Summary (Last 24 hours) at 5/12/2024 1107  Last data filed at 5/12/2024 0500  Gross per 24 hour   Intake --   Output 200 ml   Net -200 ml       Admission Weight  Weight: 77.1 kg (170 lb) (05/10/24 1006)    Daily Weight  05/10/24 : 77.1 kg (170 lb)    Image Results  CT chest wo IV contrast  Narrative: Interpreted By:  Keyur Benton,   STUDY:  CT CHEST WO IV CONTRAST;  5/11/2024 2:37 pm      INDICATION:  Signs/Symptoms:small opacities in R midlung on CXR, hx of prostate  cancer not on treatment. Concern for pneumonia.      COMPARISON:  None.      ACCESSION NUMBER(S):  [id number]      ORDERING CLINICIAN:  SENG RODRIGUEZ      TECHNIQUE:  Helical data acquisition of the chest was obtained  without IV  contrast material.  Images were reformatted in axial, coronal, and  sagittal planes.      FINDINGS:  LUNGS AND AIRWAYS:  The trachea and central airways are patent. No endobronchial lesion.      Mild bibasilar atelectasis or scarring. Multifocal areas of pleural  wall calcification worse on the left and right lateral mid pleura,  nonspecific in etiology. This could be related to previous trauma,  infectious processes or asbestos exposure amongst others.      MEDIASTINUM AND MARILIA, LOWER NECK AND AXILLA:  Heterogeneous appearance of the thyroid but no distinct masses within  limitation of this exam.      No evidence of thoracic lymphadenopathy by CT criteria.      Esophagus appears within normal  limits as seen.      HEART AND VESSELS:  The thoracic aorta is of normal course and caliber without vascular  calcifications.      Main pulmonary artery and its branches are normal in caliber.      No significant coronary wall calcifications. Mild calcification of  mitral valve annulus. The study is not optimized for evaluation of  coronary arteries.      The cardiac chambers are not enlarged.      No evidence of pericardial effusion.      UPPER ABDOMEN:  The visualized subdiaphragmatic structures demonstrate no remarkable  findings.      CHEST WALL AND OSSEOUS STRUCTURES:  There are no suspicious osseous lesions. Multilevel degenerative  changes are present      Impression: 1.  Diffuse calcified pleural plaques bilaterally as above,  nonspecific in etiology and could be related to previous trauma,  infectious processes or asbestos exposure amongst others.  2. No CT evidence for acute intrathoracic abnormality.  3. Additional detailed findings as above.      MACRO:  None      Signed by: Keyur Benton 5/11/2024 3:46 PM  Dictation workstation:   [id number]      Physical Exam  Constitutional:       Appearance: Normal appearance.   HENT:      Head: Normocephalic.      Nose: Nose normal.      Mouth/Throat:      Mouth: Mucous membranes are moist.   Eyes:      Pupils: Pupils are equal, round, and reactive to light.   Cardiovascular:      Rate and Rhythm: Normal rate and regular rhythm.      Pulses: Normal pulses.      Heart sounds: Normal heart sounds.   Pulmonary:      Effort: Pulmonary effort is normal.      Breath sounds: Normal breath sounds.   Abdominal:      General: Abdomen is flat. Bowel sounds are normal.      Palpations: Abdomen is soft.   Musculoskeletal:         General: Normal range of motion.   Skin:     General: Skin is warm.      Capillary Refill: Capillary refill takes less than 2 seconds.   Neurological:      General: No focal deficit present.      Mental Status: He is alert and oriented to person,  place, and time.   Psychiatric:         Mood and Affect: Mood normal.         Relevant Results               Assessment/Plan        Principal Problem:    Weakness  68-year-old male initially presenting for sudden onset weakness and associated fall.  Significant history of recent right inguinal hernia repair on 4/20/2024 with Dr. Noe, prostate adenocarcinoma, HTN.  New leukocytosis, with infectious workup unremarkable thus far, surgical site appears clean.  To monitor on telemetry, trending WBC, and pending PT/OT evaluation.     # Generalized weakness  -Sudden onset weakness with no clear etiology at this time.  Infectious workup remains negative, and there are no focal deficits suggestive of intracranial pathology.  -Continue to monitor on telemetry for possible arrhythmia etiology  -Orthostatic vitals  -PT/OT     # Leukocytosis -improving  -Infectious workup negative, afebrile, normotensive.  Suspect reactive in setting of recent surgery and fall. There is no concern for sepsis at this time.  -Continue to trend, currently downtrending  -CRP elevated at 18 today, although suspect this is reactive to his fall. Given the right mid-lung opacities on CXR, will obtain CT chest non-contrast for further investigation of possible pneumonia although patient has no clinical symptoms suggestive of this to require empiric coverage. Low suspicion for autoimmune etiology such as dermatomyositis given his relatively low CK     # Normocytic anemia  -Review of [facility] labs preop 4/16/2024 Hg 13.6. Initial hemoglobin here 11.7, now trending to 10.5  -Do not suspect acute hemorrhage etiology given normotension. Likely this drop in hemoglobin is from his recent surgery.     # Elevated bilirubin, resolved  -Initial elevated total bilirubin now normalized today, likely dehydration     # Bilateral inguinal hernias s/p repair of right 4/26/2024  -Surgical site appears clean,no concern for infection at this time.  Will defer  abdominal imaging given low suspicion for deeper wound infection or abscess.  -Conversation was had with Dr. Noe he indicated we could remove the staples.  Wound appeared clear on physical exam this morning.  5/12/2024    # HTN  -Continue home amlodipine 10mg and losartan 100mg     DVT prophylaxis: Lovenox  Follow-up: Regular  Consults: None     Dispo: Leukocytosis improving, although patient continues to be weak.  Pending PT/OT evaluation, anticipate he will require SNF or acute rehab placement.  Anticipate 2+ further midnights of hospitalization.  Currently awaiting PT OT recommendations for possible discharge today.                 Lopez Lopez MD

## 2024-05-12 NOTE — PROGRESS NOTES
Occupational Therapy    Evaluation    Patient Name: Emanuel Lopez  MRN: [id number]  Today's Date: 5/12/2024  Time Calculation  Start Time: 1321  Stop Time: 1334  Time Calculation (min): 13 min    Assessment  IP OT Assessment  Prognosis: Good  Evaluation/Treatment Tolerance: Patient tolerated treatment well  Medical Staff Made Aware: Yes    Plan:  Treatment Interventions: ADL retraining, Functional transfer training, UE strengthening/ROM  OT Frequency: 3 times per week  OT Discharge Recommendations: Moderate intensity level of continued care  Equipment Recommended upon Discharge: Wheeled walker (LH sponge)  OT - OK to Discharge: Yes    Subjective     Current Problem:  1. Weakness            General:  General  Reason for Referral: Decreased ADL,s/ weakness/S/P fall S/p Rt ing hernia repair 4/20/21 br Dr. Noe Drain removed 4/30/24  Referred By: Elmira Stephens MD  Past Medical History Relevant to Rehab: HTN BPH, prostate CA and surg, anemia,.  Family/Caregiver Present: No  Co-Treatment: PT  Co-Treatment Reason: help with transfers  Prior to Session Communication: Bedside nurse  Patient Position Received: Up in chair (chair check)  Preferred Learning Style: verbal, visual    Precautions:  UE Weight Bearing Status: Weight Bearing as Tolerated  Medical Precautions: Fall precautions  Precautions Comment: tele chair check purewick    Vital Signs:       Pain:  Pain Assessment  Pain Assessment: 0-10  Pain Score: 0 - No pain    Objective     Cognition:  Overall Cognitive Status: Within Functional Limits  Orientation Level: Oriented X4             Home Living:  Type of Home: House  Lives With: Adult children  Home Adaptive Equipment:  (Pt has cane may need walker)  Home Layout: Two level  Home Access: Stairs to enter with rails (5 steps in  Pt has 12 steps up to Bed room with rails 1/2 bath down)  Bathroom Shower/Tub: Walk-in shower  Bathroom Equipment: Grab bars in shower (no seat)  Home Living Comments: Pt says was indep  with ADLs and family does shopping cooking cleaning driving     Prior Function:  Level of [place]: Independent with ADLs and functional transfers, Needs assistance with ADLs (per pt prior)    IADL History:  Mode of Transportation: Car (family)    ADL:  UE Dressing Assistance: Minimal  LE Dressing Assistance: Moderate  Toileting Assistance with Device: Minimal  ADL Comments: Pt does need help with ADLs especially LE Pt just had stitches removed from Rt ing repair surg    Activity Tolerance:  Endurance: Endurance does not limit participation in activity    Bed Mobility/Transfers:   Bed Mobility  Bed Mobility: Yes  Transfers  Transfer: Yes  Transfer 1  Transfer From 1: Sit to  Transfer to 1: Stand  Transfer Level of Assistance 1: Moderate assistance (x2)  Transfers 2  Transfer From 2: Chair with arms to  Transfer to 2: Chair with arms  Transfer Device 2: Walker  Transfer Level of Assistance 2: Minimum assistance (x2)  Trials/Comments 2: Pt able to use walker but needed cues ot use. Pt ambualated  30 ft with cues Min A  of one    Ambulation/Gait Training:       Sitting Balance:       Standing Balance:       Vision: Vision - Basic Assessment  Current Vision: No visual deficits   and      Sensation:  Light Touch: No apparent deficits    Strength:       Perception:  Inattention/Neglect: Appears intact    Coordination:  Movements are Fluid and Coordinated: Yes     Hand Function:  Hand Function  Gross Grasp: Functional    Extremities: RUE   RUE : Within Functional Limits (4+/5) and LUE   LUE: Within Functional Limits (4+/5)    Outcome Measures: [facility] Daily Activity  Putting on and taking off regular lower body clothing: A lot  Bathing (including washing, rinsing, drying): A lot  Putting on and taking off regular upper body clothing: A little  Toileting, which includes using toilet, bedpan or urinal: A little  Taking care of personal grooming such as brushing teeth: None  Eating Meals: None  Daily Activity - Total Score:  18                    EDUCATION:  Education  Individual(s) Educated: Patient  Education Provided: Fall precautons, Risk and benefits of OT discussed with patient or other  Education Comment: needed cues with walke (has reachers recommended LH sponge)  Education Documentation  Precautions, taught by Gilberto Yarbrough, OT at 5/12/2024  1:54 PM.  Learner: Patient  Readiness: Acceptance  Method: Explanation, Demonstration  Response: Verbalizes Understanding, Needs Reinforcement  Comment: Nees safety cues    ADL Training, taught by Gilberto Yarbrough, OT at 5/12/2024  1:54 PM.  Learner: Patient  Readiness: Acceptance  Method: Explanation, Demonstration  Response: Verbalizes Understanding, Needs Reinforcement  Comment: Nees safety cues    Handouts, taught by Gilberto Yarbrough, OT at 5/12/2024  1:54 PM.  Learner: Patient  Readiness: Acceptance  Method: Explanation, Demonstration  Response: Verbalizes Understanding, Needs Reinforcement  Comment: Nees safety cues    Body Mechanics, taught by Gilberto Yarbrough, OT at 5/12/2024  1:54 PM.  Learner: Patient  Readiness: Acceptance  Method: Explanation, Demonstration  Response: Verbalizes Understanding, Needs Reinforcement  Comment: Nees safety cues    Home Exercise Program, taught by Gilberto Yarbrough, OT at 5/12/2024  1:54 PM.  Learner: Patient  Readiness: Acceptance  Method: Explanation, Demonstration  Response: Verbalizes Understanding, Needs Reinforcement  Comment: Nees safety cues    Mobility Training, taught by Gilberto Yarbrough, OT at 5/12/2024  1:54 PM.  Learner: Patient  Readiness: Acceptance  Method: Explanation, Demonstration  Response: Verbalizes Understanding, Needs Reinforcement  Comment: Nees safety cues    Education Comments  No comments found.        Goals:   Encounter Problems       Encounter Problems (Active)       Dressing Upper Extremities       STG -2        Start:  05/12/24    Expected End:  05/26/24       Pt will complete UE dressing with contact guard set up and  cues.             Dressings Lower Extremities       STG - 1       Start:  05/12/24    Expected End:  05/26/24       Pt will complete LE dressing with Min  A set up and cues.             Functional Mobility       Goal 3       Start:  05/12/24    Expected End:  05/26/24       Pt will increase both arm strength with 2-3# for assist with ADLs.         Goal 4       Start:  05/12/24    Expected End:  05/26/24       Pt will perform sinkside AdLs with device walker best 5-15 minutes with contact guard assist for assist with ADLs.,             OT Transfers       Goal 6       Start:  05/12/24    Expected End:  05/26/24       Pt will transfer to chair/ commode with device walker best contact guard safely.             Toileting       STG -5       Start:  05/12/24    Expected End:  05/26/24       Pt will complete toileting tasks with contact guard set up.

## 2024-05-12 NOTE — PROGRESS NOTES
05/12/24 1547   Discharge Planning   Living Arrangements Children   Support Systems Children   Assistance Needed yes   Type of Residence Private residence   Home or Post Acute Services Post acute facilities (Rehab/SNF/etc)   Type of Post Acute Facility Services Skilled nursing   Patient expects to be discharged to: skilled facility, gave choice list   Housing Stability   In the last 12 months, was there a time when you were not able to pay the mortgage or rent on time? N   In the last 12 months, how many places have you lived? 1   Transportation Needs   In the past 12 months, has lack of transportation kept you from medical appointments or from getting medications? no   In the past 12 months, has lack of transportation kept you from meetings, work, or from getting things needed for daily living? No   Patient Choice   Provider Choice list and CMS website (https://medicare.gov/care-compare#search) for post-acute Quality and Resource Measure Data were provided and reviewed with: Patient     Received notification that patient is recommended skilled placement per therapies. Met with the patient in the room, he states that he lives at home with his daughter and her . Discussed therapy with the patient, recommendation for skilled care. Gave him a list of skilled facilities to look over and discuss with family.

## 2024-05-12 NOTE — NURSING NOTE
1045: Per MD Dr. Lopez nursing staff to remove pt. Staples at this time. Order placed.    1100: Staples removed site clean and intact, with no drainage, no pain. Pt. Tolerated removal with no complications. Steri- strips applied. Clean, dry and intact.      EOS: Pt. Stable throughout this shift and was able to have an uneventful day. Pt. Had no c/o pain and no acute changes. Pt. Was able to rest intermittently throughout this shift. Pt. Was able to ambulate to the bathroom and chair this shift with staff assistance but pt. Very weak with ambulation. Staples removed this shift per MD order and pt. Tolerated well and incision dry and intact with approximated edges. Pt. Currently resting in chair at this time. Call light within reach. Chair alarm on.

## 2024-05-12 NOTE — NURSING NOTE
EOS:  Slept well through the night, VSS, remains on RA, external catheter remains in place for urinary incontinence.

## 2024-05-12 NOTE — PROGRESS NOTES
Physical Therapy    Physical Therapy Treatment    Patient Name: Emanuel Lopez  MRN: [id number]  Today's Date: 5/12/2024  Time Calculation  Start Time: 1322  Stop Time: 1332  Time Calculation (min): 10 min       Assessment/Plan   PT Assessment  PT Assessment Results: Decreased strength, Decreased endurance, Impaired balance, Decreased mobility, Decreased safety awareness  Rehab Prognosis: Good  Evaluation/Treatment Tolerance: Patient tolerated treatment well, Patient limited by fatigue  Medical Staff Made Aware: Yes  Strengths: Ability to acquire knowledge, Attitude of self  End of Session Communication: Bedside nurse  Assessment Comment: Pt presents today below baseline level of function and requires continued PT during hospital stay. Pt requires 24 hour physical assist for all mobility to prevent falls. Pt is unsafe to navigate home environment at this time with available support and assist. Pt is a high falls risk. Pt requires PT at a moderate intensity level at discharge to maximize functional mobility and safety.    End of Session Patient Position: Up in chair, Alarm on  PT Plan  Inpatient/Swing Bed or Outpatient: Inpatient  PT Plan  Treatment/Interventions: Bed mobility, Transfer training, Gait training, Stair training, Strengthening, Endurance training, Therapeutic exercise, Therapeutic activity, Home exercise program (Issued a handout on 3 le exercises, a/p, qs, and gs.)  PT Frequency: 4 times per week  PT Discharge Recommendations: Moderate intensity level of continued care  Equipment Recommended upon Discharge: Wheeled walker  PT Recommended Transfer Status: Assist x2  PT - OK to Discharge: Yes (To next level of care when cleared by medical team   )      General Visit Information:   PT  Visit  PT Received On: 05/12/24  Response to Previous Treatment: Patient with no complaints from previous session.  General  Family/Caregiver Present: No  Co-Treatment: OT  Co-Treatment Reason: for safety and discharge  planning  Prior to Session Communication: Bedside nurse  Patient Position Received: Up in room, Alarm on  Preferred Learning Style: verbal  Subjective     Precautions:  Medical Precautions: Fall precautions    Vital Signs:     Objective     Pain:   Pain Assessment  Pain Assessment: 0-10  Pain Score: 0 - No pain    Cognition:  Cognition  Overall Cognitive Status: Within Functional Limits           Activity Tolerance:  Activity Tolerance  Endurance: Tolerates less than 10 min exercise, no significant change in vital signs    Treatments:     Ambulation/Gait Training  Ambulation/Gait Training Performed: Yes  Ambulation/Gait Training 1  Device 1: Rolling walker  Gait Support Devices: Gait belt  Assistance 1: Minimum assistance  Quality of Gait 1: Inconsistent stride length, Decreased step length, Diminished heel strike, Shuffling gait, Forward flexed posture (Downward gaze, pt ambulates too far from walker. Slow gait speed. Min A for steadying)  Comments/Distance (ft) 1: 40 feet x 2. Cues provided  for walker approixmation and upright gaze.  Transfers  Transfer: Yes  Transfer 1  Transfer From 1: Sit to  Transfer to 1: Stand  Transfer Device 1: Walker  Transfer Level of Assistance 1: Moderate assistance, +2  Trials/Comments 1: pt with poor anterior weight shift to facilitate standing. pt requires cues to push off chair armrests and not pull up on walker  Transfers 2  Transfer From 2: Bed to  Transfer to 2: Chair with arms  Transfer Device 2: Walker  Transfer Level of Assistance 2: Moderate assistance, +2  Trials/Comments 2: slow to complete, needs cues for hand placement          Outcome Measures:  [facility] Basic Mobility  Turning from your back to your side while in a flat bed without using bedrails: A little  Moving from lying on your back to sitting on the side of a flat bed without using bedrails: A lot  Moving to and from bed to chair (including a wheelchair): A lot  Standing up from a chair using your arms (e.g.  wheelchair or bedside chair): A lot  To walk in hospital room: A little  Climbing 3-5 steps with railing: A lot  Basic Mobility - Total Score: 14        EDUCATION:  Individual(s) Educated: Patient  Education Provided: Body Mechanics, Fall Risk  Diagnosis and Precautions: fall precautions  Equipment:  (walker)  Risk and Benefits Discussed with Patient/Caregiver/Other: yes  Patient/Caregiver Demonstrated Understanding: yes  Plan of Care Discussed and Agreed Upon: yes  Patient Response to Education: Patient/Caregiver Verbalized Understanding of Information  Education Comment: pt verbalizes understanding but needs reinforcement of education.    Encounter Problems       Encounter Problems (Active)       Mobility       STG - Patient will ambulate x 100 ft. or more with w/w, SBA.   (Progressing)       Start:  05/11/24    Expected End:  05/25/24            STG - Patient will ascend and descend four to six stairs WITH RAIL AND A CANE, cga. (Progressing)       Start:  05/11/24    Expected End:  05/25/24               PT Transfers       STG - Patient to transfer to and from sit to supine WITH Modif. Indep..   (Progressing)       Start:  05/11/24    Expected End:  05/25/24            STG - Patient will transfer sit to and from stand into a w/w with Modif. Indep..   (Progressing)       Start:  05/11/24    Expected End:  05/25/24

## 2024-05-13 VITALS
OXYGEN SATURATION: 99 % | DIASTOLIC BLOOD PRESSURE: 111 MMHG | BODY MASS INDEX: 23.03 KG/M2 | HEIGHT: 72 IN | RESPIRATION RATE: 18 BRPM | TEMPERATURE: 97.7 F | SYSTOLIC BLOOD PRESSURE: 176 MMHG | WEIGHT: 170 LBS | HEART RATE: 97 BPM

## 2024-05-13 PROBLEM — R53.1 WEAKNESS: Status: RESOLVED | Noted: 2024-05-10 | Resolved: 2024-05-13

## 2024-05-13 LAB
ALBUMIN SERPL BCP-MCNC: 3.3 G/DL (ref 3.4–5)
ANION GAP SERPL CALC-SCNC: 11 MMOL/L (ref 10–20)
ATRIAL RATE: 97 BPM
ATRIAL RATE: 97 BPM
ATRIAL RATE: 98 BPM
BUN SERPL-MCNC: 22 MG/DL (ref 6–23)
CALCIUM SERPL-MCNC: 8.9 MG/DL (ref 8.6–10.3)
CHLORIDE SERPL-SCNC: 105 MMOL/L (ref 98–107)
CO2 SERPL-SCNC: 29 MMOL/L (ref 21–32)
CREAT SERPL-MCNC: 1.05 MG/DL (ref 0.5–1.3)
EGFRCR SERPLBLD CKD-EPI 2021: 77 ML/MIN/1.73M*2
ERYTHROCYTE [DISTWIDTH] IN BLOOD BY AUTOMATED COUNT: 11.8 % (ref 11.5–14.5)
GLUCOSE SERPL-MCNC: 105 MG/DL (ref 74–99)
HCT VFR BLD AUTO: 32.8 % (ref 41–52)
HGB BLD-MCNC: 10.6 G/DL (ref 13.5–17.5)
LABORATORY COMMENT REPORT: NORMAL
MAGNESIUM SERPL-MCNC: 1.91 MG/DL (ref 1.6–2.4)
MCH RBC QN AUTO: 31.3 PG (ref 26–34)
MCHC RBC AUTO-ENTMCNC: 32.3 G/DL (ref 32–36)
MCV RBC AUTO: 97 FL (ref 80–100)
NRBC BLD-RTO: 0 /100 WBCS (ref 0–0)
P AXIS: 32 DEGREES
P AXIS: 32 DEGREES
P AXIS: 68 DEGREES
P OFFSET: 183 MS
P OFFSET: 183 MS
P OFFSET: 187 MS
P ONSET: 130 MS
P ONSET: 130 MS
P ONSET: 132 MS
PATH REPORT.FINAL DX SPEC: NORMAL
PATH REPORT.GROSS SPEC: NORMAL
PATH REPORT.RELEVANT HX SPEC: NORMAL
PATH REPORT.TOTAL CANCER: NORMAL
PHOSPHATE SERPL-MCNC: 3 MG/DL (ref 2.5–4.9)
PLATELET # BLD AUTO: 317 X10*3/UL (ref 150–450)
POTASSIUM SERPL-SCNC: 3.8 MMOL/L (ref 3.5–5.3)
PR INTERVAL: 174 MS
PR INTERVAL: 180 MS
PR INTERVAL: 180 MS
Q ONSET: 219 MS
Q ONSET: 220 MS
Q ONSET: 220 MS
QRS COUNT: 15 BEATS
QRS COUNT: 15 BEATS
QRS COUNT: 16 BEATS
QRS DURATION: 92 MS
QRS DURATION: 92 MS
QRS DURATION: 96 MS
QT INTERVAL: 358 MS
QT INTERVAL: 358 MS
QT INTERVAL: 368 MS
QTC CALCULATION(BAZETT): 454 MS
QTC CALCULATION(BAZETT): 454 MS
QTC CALCULATION(BAZETT): 469 MS
QTC FREDERICIA: 420 MS
QTC FREDERICIA: 420 MS
QTC FREDERICIA: 433 MS
R AXIS: -9 DEGREES
R AXIS: 11 DEGREES
R AXIS: 11 DEGREES
RBC # BLD AUTO: 3.39 X10*6/UL (ref 4.5–5.9)
SODIUM SERPL-SCNC: 141 MMOL/L (ref 136–145)
T AXIS: 51 DEGREES
T AXIS: 51 DEGREES
T AXIS: 55 DEGREES
T OFFSET: 399 MS
T OFFSET: 399 MS
T OFFSET: 403 MS
VENTRICULAR RATE: 97 BPM
VENTRICULAR RATE: 97 BPM
VENTRICULAR RATE: 98 BPM
WBC # BLD AUTO: 5.7 X10*3/UL (ref 4.4–11.3)

## 2024-05-13 PROCEDURE — 36415 COLL VENOUS BLD VENIPUNCTURE: CPT | Performed by: STUDENT IN AN ORGANIZED HEALTH CARE EDUCATION/TRAINING PROGRAM

## 2024-05-13 PROCEDURE — 2500000001 HC RX 250 WO HCPCS SELF ADMINISTERED DRUGS (ALT 637 FOR MEDICARE OP): Performed by: STUDENT IN AN ORGANIZED HEALTH CARE EDUCATION/TRAINING PROGRAM

## 2024-05-13 PROCEDURE — 85027 COMPLETE CBC AUTOMATED: CPT | Performed by: STUDENT IN AN ORGANIZED HEALTH CARE EDUCATION/TRAINING PROGRAM

## 2024-05-13 PROCEDURE — 2500000006 HC RX 250 W HCPCS SELF ADMINISTERED DRUGS (ALT 637 FOR ALL PAYERS): Performed by: STUDENT IN AN ORGANIZED HEALTH CARE EDUCATION/TRAINING PROGRAM

## 2024-05-13 PROCEDURE — 99238 HOSP IP/OBS DSCHRG MGMT 30/<: CPT | Performed by: STUDENT IN AN ORGANIZED HEALTH CARE EDUCATION/TRAINING PROGRAM

## 2024-05-13 PROCEDURE — 83735 ASSAY OF MAGNESIUM: CPT | Performed by: STUDENT IN AN ORGANIZED HEALTH CARE EDUCATION/TRAINING PROGRAM

## 2024-05-13 PROCEDURE — 2500000004 HC RX 250 GENERAL PHARMACY W/ HCPCS (ALT 636 FOR OP/ED): Performed by: STUDENT IN AN ORGANIZED HEALTH CARE EDUCATION/TRAINING PROGRAM

## 2024-05-13 PROCEDURE — 80069 RENAL FUNCTION PANEL: CPT | Performed by: STUDENT IN AN ORGANIZED HEALTH CARE EDUCATION/TRAINING PROGRAM

## 2024-05-13 RX ADMIN — TAMSULOSIN HYDROCHLORIDE 0.4 MG: 0.4 CAPSULE ORAL at 20:00

## 2024-05-13 RX ADMIN — AMLODIPINE BESYLATE 10 MG: 10 TABLET ORAL at 20:00

## 2024-05-13 RX ADMIN — LOSARTAN POTASSIUM 100 MG: 100 TABLET, FILM COATED ORAL at 20:00

## 2024-05-13 RX ADMIN — ENOXAPARIN SODIUM 40 MG: 40 INJECTION SUBCUTANEOUS at 18:11

## 2024-05-13 ASSESSMENT — PAIN SCALES - GENERAL: PAINLEVEL_OUTOF10: 0 - NO PAIN

## 2024-05-13 NOTE — DISCHARGE SUMMARY
Discharge Diagnosis  Weakness    Issues Requiring Follow-Up  # Bilateral inguinal hernias s/p repair of right 4/26/2024   -Staples removed 5/12, follow up with Dr. Noe outpatient    Discharge Meds     Your medication list        CONTINUE taking these medications        Instructions Last Dose Given Next Dose Due   amLODIPine 10 mg tablet  Commonly known as: Norvasc           losartan 100 mg tablet  Commonly known as: Cozaar           potassium chloride CR 10 mEq ER tablet  Commonly known as: Klor-Con           tamsulosin 0.4 mg 24 hr capsule  Commonly known as: Flomax                  STOP taking these medications      acetaminophen-codeine 300-30 mg tablet  Commonly known as: Tylenol w/ Codeine #3        chlorhexidine 0.12 % solution  Commonly known as: Peridex                 Test Results Pending At Discharge  Pending Labs       No current pending labs.            Hospital Course   68-year-old male initially presenting for sudden onset weakness and associated fall at home.  Significant history of recent right inguinal hernia repair on 4/20/2024 with Dr. Noe, prostate adenocarcinoma, HTN.  New leukocytosis, with infectious workup unremarkable thus far, surgical site appears clean. Leukocytosis downtrended without any antibiotics, no concern for infectious etiology. Has remained afebrile and normotensive throughout admission. Prior surgical site staples removed 5/12. CT chest unremarkable. Likely his weakness is from deconditioning. Medically clear for discharge, PT/OT recommended SNF placement to which he and his daughter are agreeable with. To follow up with Dr. Noe outpatient for further plans on his left inguinal hernia, and Dr Moseley for eventual intervention on his prostate cancer.    Pertinent Physical Exam At Time of Discharge  Constitutional: Well developed, awake/alert/oriented x4, no distress, alert and cooperative  Skin: Warm and dry, no lesions, no rashes.  Right inguinal surgical site with  steri-strips in place  Eyes: Anicteric, clear sclera  ENMT: mucous membranes moist, no apparent injury, no lesions seen  Head/Neck: Atraumatic  Respiratory: Lungs clear to auscultation bilaterally with no wheezes, rhonci or crackles  CV: Regular rate and rhythm, no murmurs or gallops auscultated  GI: Non-tender to deep palpation, no guarding.  No pain on palpation of inguinal repair site  MSK: no joint swelling  Neuro: NIH 0, no focal deficits or loss of sensation  Extremities: normal extremities, no cyanosis edema, contusions or wounds, no clubbing  Psych: Appropriate mood and behavior    Outpatient Follow-Up  No future appointments.      Lyle Osborn DO

## 2024-05-13 NOTE — CARE PLAN
The patient's goals for the shift include      The clinical goals for the shift include see poc

## 2024-05-13 NOTE — DISCHARGE INSTRUCTIONS
Please call and follow up with your primary care provider and schedule a follow up appointment in 2-3 days.     Please take all of your medications as directed.  No medication changes have been made.    If you have any concerning symptoms, or worsening symptoms please call or return, such as chest pain, shortness of breath, new onset confusion, loss of sensation, or fever >103F.    Thank you for allowing us to participate in your health care.    -[facility] Medicine Teaching Service.

## 2024-05-13 NOTE — PROGRESS NOTES
05/13/24 0845   Discharge Planning   Home or Post Acute Services Post acute facilities (Rehab/SNF/etc)   Type of Post Acute Facility Services Skilled nursing   Patient expects to be discharged to: SNF     Spoke to patient at bedside to discuss SNF choices. Patient states he has not had the chance to talk it over with his daughter Pao. Patient gave permission to call Pao. Spoke to Pao on the phone and she stated she wants to talk to her dad first before discussing this option further. Option given to email SNF list to her now and she declined and wants to talk with patient first. TCC to follow up.

## 2024-05-13 NOTE — PROGRESS NOTES
05/13/24 1125   Discharge Planning   Living Arrangements Children   Support Systems Children   Type of Residence Private residence   Home or Post Acute Services Post acute facilities (Rehab/SNF/etc)   Type of Post Acute Facility Services Skilled nursing   Patient expects to be discharged to: referral sent to the [facility].   Does the patient need discharge transport arranged? Yes   RoundTrip coordination needed? Yes   Has discharge transport been arranged? No   Patient Choice   Provider Choice list and CMS website (https://medicare.gov/care-compare#search) for post-acute Quality and Resource Measure Data were provided and reviewed with: Patient     Per bedside nurse, the pt's dtr called. She and the pt chose the [facility]. Asked the DSC to send the referral.     1400 [facility] accepted. Asked the DSC to start precert.     1630 Precert was received. DSC made arrangements for wheelchair transport at 7:30pm. DSC to send the [facility], AVS and 7000. Updated the facility, nursing, pt and his dtr.

## 2024-05-13 NOTE — PROGRESS NOTES
Emanuel Lopez is a 68 y.o. male on day 3 of admission presenting with Weakness.      Subjective   No acute events overnight.  Has remained afebrile and HDS.  Staples removed yesterday, steri-strips in place, clean. Medically clear for discharge, discussed SNF placement, to which he is agreeable with but is waiting for his daughter to discuss with case management for choice.       Objective     Last Recorded Vitals  BP (!) 156/94 (BP Location: Left arm, Patient Position: Lying)   Pulse 80   Temp 36.2 °C (97.2 °F) (Temporal)   Resp 18   Wt 77.1 kg (170 lb)   SpO2 99%   Intake/Output last 3 Shifts:    Intake/Output Summary (Last 24 hours) at 5/13/2024 1034  Last data filed at 5/13/2024 0457  Gross per 24 hour   Intake 240 ml   Output 1000 ml   Net -760 ml       Admission Weight  Weight: 77.1 kg (170 lb) (05/10/24 1006)    Daily Weight  05/10/24 : 77.1 kg (170 lb)      Scheduled medications  amLODIPine, 10 mg, oral, Daily  enoxaparin, 40 mg, subcutaneous, q24h  losartan, 100 mg, oral, Daily  tamsulosin, 0.4 mg, oral, Daily      Continuous medications     PRN medications       Physical Exam  Constitutional: Well developed, awake/alert/oriented x4, no distress, alert and cooperative  Skin: Warm and dry, no lesions, no rashes.  Right inguinal surgical site with steri-strips in place  Eyes: Anicteric, clear sclera  ENMT: mucous membranes moist, no apparent injury, no lesions seen  Head/Neck: Atraumatic  Respiratory: Lungs clear to auscultation bilaterally with no wheezes, rhonci or crackles  CV: Regular rate and rhythm, no murmurs or gallops auscultated  GI: Non-tender to deep palpation, no guarding.  No pain on palpation of inguinal repair site  MSK: no joint swelling  Neuro: NIH 0, no focal deficits or loss of sensation  Extremities: normal extremities, no cyanosis edema, contusions or wounds, no clubbing  Psych: Appropriate mood and behavior    Relevant Results  Results for orders placed or performed during the  hospital encounter of 05/10/24 (from the past 24 hour(s))   Renal Function Panel   Result Value Ref Range    Glucose 105 (H) 74 - 99 mg/dL    Sodium 141 136 - 145 mmol/L    Potassium 3.8 3.5 - 5.3 mmol/L    Chloride 105 98 - 107 mmol/L    Bicarbonate 29 21 - 32 mmol/L    Anion Gap 11 10 - 20 mmol/L    Urea Nitrogen 22 6 - 23 mg/dL    Creatinine 1.05 0.50 - 1.30 mg/dL    eGFR 77 >60 mL/min/1.73m*2    Calcium 8.9 8.6 - 10.3 mg/dL    Phosphorus 3.0 2.5 - 4.9 mg/dL    Albumin 3.3 (L) 3.4 - 5.0 g/dL   Magnesium   Result Value Ref Range    Magnesium 1.91 1.60 - 2.40 mg/dL   CBC   Result Value Ref Range    WBC 5.7 4.4 - 11.3 x10*3/uL    nRBC 0.0 0.0 - 0.0 /100 WBCs    RBC 3.39 (L) 4.50 - 5.90 x10*6/uL    Hemoglobin 10.6 (L) 13.5 - 17.5 g/dL    Hematocrit 32.8 (L) 41.0 - 52.0 %    MCV 97 80 - 100 fL    MCH 31.3 26.0 - 34.0 pg    MCHC 32.3 32.0 - 36.0 g/dL    RDW 11.8 11.5 - 14.5 %    Platelets 317 150 - 450 x10*3/uL        Image Results  CT chest wo IV contrast  Narrative: Interpreted By:  Keyur Benton,   STUDY:  CT CHEST WO IV CONTRAST;  5/11/2024 2:37 pm      INDICATION:  Signs/Symptoms:small opacities in R midlung on CXR, hx of prostate  cancer not on treatment. Concern for pneumonia.      COMPARISON:  None.      ACCESSION NUMBER(S):  [id number]      ORDERING CLINICIAN:  SENG RODRIGUEZ      TECHNIQUE:  Helical data acquisition of the chest was obtained  without IV  contrast material.  Images were reformatted in axial, coronal, and  sagittal planes.      FINDINGS:  LUNGS AND AIRWAYS:  The trachea and central airways are patent. No endobronchial lesion.      Mild bibasilar atelectasis or scarring. Multifocal areas of pleural  wall calcification worse on the left and right lateral mid pleura,  nonspecific in etiology. This could be related to previous trauma,  infectious processes or asbestos exposure amongst others.      MEDIASTINUM AND MARILIA, LOWER NECK AND AXILLA:  Heterogeneous appearance of the thyroid but no distinct  masses within  limitation of this exam.      No evidence of thoracic lymphadenopathy by CT criteria.      Esophagus appears within normal limits as seen.      HEART AND VESSELS:  The thoracic aorta is of normal course and caliber without vascular  calcifications.      Main pulmonary artery and its branches are normal in caliber.      No significant coronary wall calcifications. Mild calcification of  mitral valve annulus. The study is not optimized for evaluation of  coronary arteries.      The cardiac chambers are not enlarged.      No evidence of pericardial effusion.      UPPER ABDOMEN:  The visualized subdiaphragmatic structures demonstrate no remarkable  findings.      CHEST WALL AND OSSEOUS STRUCTURES:  There are no suspicious osseous lesions. Multilevel degenerative  changes are present      Impression: 1.  Diffuse calcified pleural plaques bilaterally as above,  nonspecific in etiology and could be related to previous trauma,  infectious processes or asbestos exposure amongst others.  2. No CT evidence for acute intrathoracic abnormality.  3. Additional detailed findings as above.      MACRO:  None      Signed by: Keyur Benton 5/11/2024 3:46 PM  Dictation workstation:   [id number]           Assessment/Plan   68-year-old male initially presenting for sudden onset weakness and associated fall.  Significant history of recent right inguinal hernia repair on 4/20/2024 with Dr. Noe, prostate adenocarcinoma, HTN.  New leukocytosis, with infectious workup unremarkable thus far, surgical site appears clean. Staples removed 5/12. CT chest unremarkable. Likely his weakness is from deconditioning. Medically clear for discharge awaiting SNF choice from his daughter.     # Generalized weakness  -Sudden onset weakness with no clear etiology at this time.  Infectious workup remains negative, and there are no focal deficits suggestive of intracranial pathology.  -Continue to monitor on telemetry for possible arrhythmia  etiology  -Orthostatic vitals  -PT/OT     # Leukocytosis - resolved  -Leukocytosis resolved, no concern for infectious etiology. CT chest unimpressive     # Normocytic anemia  -Review of [facility] labs preop 4/16/2024 Hg 13.6.  -Do not suspect acute hemorrhage etiology given normotension      # Bilateral inguinal hernias s/p repair of right 4/26/2024  -Surgical site appears clean, staples removed on 5/12      # HTN  -Continue home amlodipine 10mg and losartan 100mg     DVT prophylaxis: Lovenox  Follow-up: Regular  Consults: None     Dispo: Medically clear for discharge at this time, pending SNF choice.       To be staffed with attending,  Lyle Osborn, DO  Internal Medicine PGY-3

## 2024-05-13 NOTE — NURSING NOTE
Patient resting in room, assessment and vitals maintained. Updated patient family at bedside, [facility] [place]

## 2024-05-13 NOTE — PROGRESS NOTES
Spiritual Care Visit    Clinical Encounter Type  Visited With: Patient  Routine Visit: Introduction  Continue Visiting: No                                            Taxonomy  Intended Effects: Kyara affirmation, Preserve dignity and respect, Helping someone feel comforted, Promote sense of peace  Methods: Offer spiritual/Taoist support  Interventions: Share words of hope and inspiration    Patient shared he is a Roman Catholic and his kingdom lower[place] has been keeping in contact with him.  Downey listened to his story and was a supportive presence.

## 2024-05-14 NOTE — NURSING NOTE
Patient to be discharged today at 1930 . Patient worked with therapy yesterday , patient continues to have weakness and up with assist and walker . Patient family notified and picked facility

## 2024-05-23 ENCOUNTER — APPOINTMENT (OUTPATIENT)
Dept: SURGERY | Facility: CLINIC | Age: 69
End: 2024-05-23
Payer: MEDICARE

## 2024-05-23 ENCOUNTER — OFFICE VISIT (OUTPATIENT)
Dept: SURGERY | Facility: CLINIC | Age: 69
End: 2024-05-23
Payer: MEDICARE

## 2024-05-23 VITALS
WEIGHT: 171 LBS | DIASTOLIC BLOOD PRESSURE: 102 MMHG | RESPIRATION RATE: 16 BRPM | OXYGEN SATURATION: 98 % | BODY MASS INDEX: 23.16 KG/M2 | TEMPERATURE: 98 F | SYSTOLIC BLOOD PRESSURE: 160 MMHG | HEIGHT: 72 IN | HEART RATE: 99 BPM

## 2024-05-23 DIAGNOSIS — K40.90 NON-RECURRENT UNILATERAL INGUINAL HERNIA WITHOUT OBSTRUCTION OR GANGRENE: Primary | ICD-10-CM

## 2024-05-23 DIAGNOSIS — K40.90 NON-RECURRENT UNILATERAL INGUINAL HERNIA WITHOUT OBSTRUCTION OR GANGRENE: ICD-10-CM

## 2024-05-23 PROCEDURE — 99213 OFFICE O/P EST LOW 20 MIN: CPT | Performed by: SURGERY

## 2024-05-23 PROCEDURE — 1111F DSCHRG MED/CURRENT MED MERGE: CPT | Performed by: SURGERY

## 2024-05-23 PROCEDURE — 1159F MED LIST DOCD IN RCRD: CPT | Performed by: SURGERY

## 2024-05-23 NOTE — PROGRESS NOTES
Emanuel Lopez   [id number]   1955       Chief Complaint/      Rescheduling      HPI/      Patient is status post repair of the large right scrotal hernia.  Left side repair was scheduled over the patient had a fainting episode and surgery was canceled    Patient is going on a convalescent review presents for scheduling        Past Medical History:   Diagnosis Date    Anemia     BPH (benign prostatic hyperplasia)     Hypertension     Inguinal hernia of left side without obstruction or gangrene     Prostate cancer (Multi)           Current Outpatient Medications:     amLODIPine (Norvasc) 10 mg tablet, Take 1 tablet (10 mg) by mouth once daily., Disp: , Rfl:     losartan (Cozaar) 100 mg tablet, Take 1 tablet (100 mg) by mouth once daily., Disp: , Rfl:     potassium chloride CR 10 mEq ER tablet, Take 1 tablet (10 mEq) by mouth once daily., Disp: , Rfl:     tamsulosin (Flomax) 0.4 mg 24 hr capsule, Take 1 capsule (0.4 mg) by mouth once daily., Disp: , Rfl:      No Known Allergies     [unfilled]     ECG 12 lead (Ancillary Performed)  Normal sinus rhythm  Minimal voltage criteria for LVH, may be normal variant  Borderline ECG  When compared with ECG of 10-MAY-2024 11:26, (unconfirmed)  No significant change was found  Confirmed by Saeid Pinedo ([id number]) on 5/13/2024 11:10:24 PM         BP (!) 160/102   Pulse 99   Temp 36.7 °C (98 °F) (Temporal)   Resp 16   Ht 1.829 m (6')   Wt 77.6 kg (171 lb)   SpO2 98%   BMI 23.19 kg/m²      Physical Exam  Abdominal:      Comments: Right groin wound is clean and dry without redness drainage or swelling    Just a little bit of swelling in the right hemiscrotum    The large left inguinal hernia persists              Assessment/    Doing well status post repair of right scrotal hernia    Large left inguinal hernia      Plan/        Will proceed to open repair of left inguinal hernia on 6/26/2024    Open repair of the inguinal hernia is reviewed.    The potential of bleeding,  infection, MI, PE/DVT, death, etc. reviewed.  The anticipated convalescence is reviewed.  Use of mesh and prosthetic materials is reviewed.  The plan for postoperative pain management is reviewed.  All questions were answered and consent was obtained

## 2024-05-24 ENCOUNTER — TELEPHONE (OUTPATIENT)
Dept: FAMILY MEDICINE CLINIC | Age: 69
End: 2024-05-24

## 2024-05-24 NOTE — TELEPHONE ENCOUNTER
CHRISTALT from [facility] called to ask for verbal for pt to receive PT & OT.    JT:  [phone]

## 2024-05-28 ENCOUNTER — NURSING HOME VISIT (OUTPATIENT)
Dept: POST ACUTE CARE | Facility: EXTERNAL LOCATION | Age: 69
End: 2024-05-28
Payer: MEDICARE

## 2024-05-28 DIAGNOSIS — R53.1 GENERALIZED WEAKNESS: Primary | ICD-10-CM

## 2024-05-28 DIAGNOSIS — I11.9 CARDIOMYOPATHY, HYPERTENSIVE, BENIGN, WITHOUT HEART FAILURE (MULTI): ICD-10-CM

## 2024-05-28 DIAGNOSIS — D64.9 ANEMIA, UNSPECIFIED TYPE: ICD-10-CM

## 2024-05-28 DIAGNOSIS — I43 CARDIOMYOPATHY, HYPERTENSIVE, BENIGN, WITHOUT HEART FAILURE (MULTI): ICD-10-CM

## 2024-05-28 DIAGNOSIS — N40.0 BENIGN PROSTATIC HYPERPLASIA, UNSPECIFIED WHETHER LOWER URINARY TRACT SYMPTOMS PRESENT: ICD-10-CM

## 2024-05-28 DIAGNOSIS — C61 PROSTATE CANCER (MULTI): ICD-10-CM

## 2024-05-28 PROCEDURE — 99497 ADVNCD CARE PLAN 30 MIN: CPT | Performed by: EMERGENCY MEDICINE

## 2024-05-28 PROCEDURE — 99305 1ST NF CARE MODERATE MDM 35: CPT | Performed by: EMERGENCY MEDICINE

## 2024-05-28 NOTE — TELEPHONE ENCOUNTER
MARLON called back gave verbal order, and gave him our fax #   MARLON was unaware that the number was not working he will look into this

## 2024-05-29 ENCOUNTER — TELEPHONE (OUTPATIENT)
Dept: FAMILY MEDICINE CLINIC | Age: 69
End: 2024-05-29

## 2024-05-29 NOTE — TELEPHONE ENCOUNTER
Patient recently was discharged from a rehab center. He will now receive 7 home [facility] PT visits [phone]

## 2024-06-03 NOTE — PROGRESS NOTES
Emanuel Lopez   68 y.o.  male  @location@            Assessment and Plan:  History and physical    Weakness   Anemia   BPH (benign prostatic hyperplasia)   Hypertension   Inguinal hernia of left side without obstruction or gangrene   Prostate cancer (Multi)       Hospital Course   68-year-old male initially presenting for sudden onset weakness and associated fall at home.  Significant history of recent right inguinal hernia repair on 4/20/2024 with Dr. Noe, prostate adenocarcinoma, HTN.  New leukocytosis, with infectious workup unremarkable thus far, surgical site appears clean. Leukocytosis downtrended without any antibiotics, no concern for infectious etiology. Has remained afebrile and normotensive throughout admission. Prior surgical site staples removed 5/12. CT chest unremarkable. Likely his weakness is from deconditioning. Medically clear for discharge, PT/OT recommended SNF placement to which he and his daughter are agreeable with. To follow up with Dr. Noe outpatient for further plans on his left inguinal hernia, and Dr Moseley for eventual intervention on his prostate cancer.     I discussed advanced care planning for more than 16 minutes including the explanation and discussion of advanced directives. If patient does not have current up to date documents, examples and information provided on how to create both living will and power of . Patient was encouraged to work on completing these documents.  Information and advise was also provided on DO NOT RESUSCITATE and patient encouraged to consider this  Patient is not sure about DNR at this time.  CODE STATUS is on file with the facility    Source of history: Nurse, Medical personnel, Medical record, Patient.  History limitation: None.    HPI:  History and physical    Patient is unable to give any detailed history and therefore history is obtained from the chart  No acute complaints voiced by the patient or acute concerns raised by  nursing    History of hospitalization-    Emanuel Lopez is a 68 y.o. male presenting with sudden onset weakness.  He has significant history of recent right inguinal hernia repair with Dr. Noe on 4/20/2024, which was uncomplicated and had subsequent drain removal on 4/30.  He was of his usual health since his procedure, however today he was to follow-up with Dr. Noe for subsequent staple removal.  When he awoke at around 5 AM today he felt extremely weak.  He attempted to stand out of bed, however cannot do so and slowly let himself down to the ground.  He then felt too weak to pick himself off the ground, thus was able to grab a plastic laundry basket and slid himself to the living room.  He estimates that this lasted about several hours before being found by his daughter in the living room.  He was then brought to the ED for further evaluation.  Denies any recent new medications or recent illness.  Denies any issues with his surgical site, no drainage or significant abdominal pain.  Denies any palpitations or chest pain or shortness of breath.  Denies focal weakness.  He lives at home with his daughter and son-in-law, ambulates with a cane.     ED course: Initial vitals 36.2 °C, 90 bpm, 174/86, saturating 98% on room air.  Initial EKG 98 bpm, normal sinus rhythm, isolated ST elevation in V3, however this is seen in prior EKG on 4/25/2024.  Lab work demonstrating new leukocytosis of 21, and anemia hemoglobin 11.7.  CT head and C-spine were obtained negative for acute pathology.  CXR demonstrated small nodular opacities in right midlung, but otherwise no acute process.  Patient failed to ambulate with assistance in the ED, thus was admitted for further evaluation.     PMHx: Prostate adenocarcinoma Quin 6 low-grade pending prostatectomy with Dr. Irby, HTN, chronic normocytic anemia  SurgHx: Right inguinal hernia repair 4/20/2024 with Dr. Noe with subsequent drain removal 4/30, prostate  biopsy  FamHx: Reviewed  Social Hx: Denies tobacco use, recreational drug use, alcohol use  Allergies: NKDA     CODE STATUS: DNR-DNI     Past Medical History  Medical History        Past Medical History:   Diagnosis Date    Anemia      BPH (benign prostatic hyperplasia)      Hypertension      Inguinal hernia of left side without obstruction or gangrene      Prostate cancer (Multi)              Surgical History  Surgical History         Past Surgical History:   Procedure Laterality Date    HERNIA REPAIR        PROSTATE SURGERY         biopsy            Social History   reports that he has never smoked. He has never used smokeless tobacco. He reports that he does not drink alcohol and does not use drugs.     Family History  Family History          Family History   Problem Relation Name Age of Onset    Cancer Father                Allergies  Patient has no known allergies.  Current Outpatient Medications   Medication Sig Dispense Refill    amLODIPine (Norvasc) 10 mg tablet Take 1 tablet (10 mg) by mouth once daily.      losartan (Cozaar) 100 mg tablet Take 1 tablet (100 mg) by mouth once daily.      potassium chloride CR 10 mEq ER tablet Take 1 tablet (10 mEq) by mouth once daily.      tamsulosin (Flomax) 0.4 mg 24 hr capsule Take 1 capsule (0.4 mg) by mouth once daily.       No current facility-administered medications for this visit.       Physical Exam:  Vital signs as per nursing/MA documentation were reviewed  General appearance: Alert and in no acute distress  HEENT: Normal Inspection  Neck - Normal Inspection  Respiratory : No respiratory distress. Lungs are clear   Cardiovascular: heart rate normal. No gallop  Back - normal inspection  Skin inspection:Warm  Musculoskeletal : No deformities  Neuro : Limited exam. Baseline    ROS: Review of symptoms is negative except for what is mentioned in HPI    Results/Data  Records including but not limited to electronic medical records, relevant lab work and imaging from  patient's health care facility encounter were reviewed and independently verified      Charting was completed using voice recognition technology and may include unintended errors.    Discussed with patient/family, RN, and NP.

## 2024-06-12 ENCOUNTER — PRE-ADMISSION TESTING (OUTPATIENT)
Dept: PREADMISSION TESTING | Facility: HOSPITAL | Age: 69
End: 2024-06-12
Payer: MEDICARE

## 2024-06-12 ENCOUNTER — LAB (OUTPATIENT)
Dept: LAB | Facility: LAB | Age: 69
End: 2024-06-12
Payer: MEDICARE

## 2024-06-12 VITALS
HEIGHT: 73 IN | WEIGHT: 167.11 LBS | SYSTOLIC BLOOD PRESSURE: 167 MMHG | RESPIRATION RATE: 12 BRPM | OXYGEN SATURATION: 99 % | HEART RATE: 85 BPM | TEMPERATURE: 96.8 F | DIASTOLIC BLOOD PRESSURE: 88 MMHG | BODY MASS INDEX: 22.15 KG/M2

## 2024-06-12 DIAGNOSIS — Z01.818 PRE-OP TESTING: ICD-10-CM

## 2024-06-12 DIAGNOSIS — K40.90 NON-RECURRENT UNILATERAL INGUINAL HERNIA WITHOUT OBSTRUCTION OR GANGRENE: Primary | ICD-10-CM

## 2024-06-12 DIAGNOSIS — K40.90 NON-RECURRENT UNILATERAL INGUINAL HERNIA WITHOUT OBSTRUCTION OR GANGRENE: ICD-10-CM

## 2024-06-12 LAB
ANION GAP SERPL CALC-SCNC: 13 MMOL/L (ref 10–20)
BUN SERPL-MCNC: 22 MG/DL (ref 6–23)
CALCIUM SERPL-MCNC: 9.7 MG/DL (ref 8.6–10.3)
CHLORIDE SERPL-SCNC: 102 MMOL/L (ref 98–107)
CO2 SERPL-SCNC: 30 MMOL/L (ref 21–32)
CREAT SERPL-MCNC: 1.05 MG/DL (ref 0.5–1.3)
EGFRCR SERPLBLD CKD-EPI 2021: 77 ML/MIN/1.73M*2
ERYTHROCYTE [DISTWIDTH] IN BLOOD BY AUTOMATED COUNT: 12.7 % (ref 11.5–14.5)
GLUCOSE SERPL-MCNC: 90 MG/DL (ref 74–99)
HCT VFR BLD AUTO: 39.1 % (ref 41–52)
HGB BLD-MCNC: 12.9 G/DL (ref 13.5–17.5)
MCH RBC QN AUTO: 31.5 PG (ref 26–34)
MCHC RBC AUTO-ENTMCNC: 33 G/DL (ref 32–36)
MCV RBC AUTO: 96 FL (ref 80–100)
NRBC BLD-RTO: 0 /100 WBCS (ref 0–0)
PLATELET # BLD AUTO: 224 X10*3/UL (ref 150–450)
POTASSIUM SERPL-SCNC: 3.6 MMOL/L (ref 3.5–5.3)
RBC # BLD AUTO: 4.09 X10*6/UL (ref 4.5–5.9)
SODIUM SERPL-SCNC: 141 MMOL/L (ref 136–145)
WBC # BLD AUTO: 4.7 X10*3/UL (ref 4.4–11.3)

## 2024-06-12 PROCEDURE — 36415 COLL VENOUS BLD VENIPUNCTURE: CPT

## 2024-06-12 PROCEDURE — 80048 BASIC METABOLIC PNL TOTAL CA: CPT

## 2024-06-12 PROCEDURE — 85027 COMPLETE CBC AUTOMATED: CPT

## 2024-06-12 PROCEDURE — 87081 CULTURE SCREEN ONLY: CPT | Mod: STJLAB

## 2024-06-12 RX ORDER — CHLORHEXIDINE GLUCONATE ORAL RINSE 1.2 MG/ML
SOLUTION DENTAL
Qty: 473 ML | Refills: 0 | Status: SHIPPED | OUTPATIENT
Start: 2024-06-12 | End: 2024-06-12

## 2024-06-12 ASSESSMENT — DUKE ACTIVITY SCORE INDEX (DASI)
CAN YOU PARTICIPATE IN STRENOUS SPORTS LIKE SWIMMING, SINGLES TENNIS, FOOTBALL, BASKETBALL, OR SKIING: NO
DASI METS SCORE: 4.1
CAN YOU RUN A SHORT DISTANCE: NO
CAN YOU HAVE SEXUAL RELATIONS: NO
CAN YOU WALK INDOORS, SUCH AS AROUND YOUR HOUSE: YES
CAN YOU CLIMB A FLIGHT OF STAIRS OR WALK UP A HILL: NO
CAN YOU TAKE CARE OF YOURSELF (EAT, DRESS, BATHE, OR USE TOILET): YES
CAN YOU DO MODERATE WORK AROUND THE HOUSE LIKE VACUUMING, SWEEPING FLOORS OR CARRYING GROCERIES: YES
CAN YOU WALK A BLOCK OR TWO ON LEVEL GROUND: NO
CAN YOU PARTICIPATE IN MODERATE RECREATIONAL ACTIVITIES LIKE GOLF, BOWLING, DANCING, DOUBLES TENNIS OR THROWING A BASEBALL OR FOOTBALL: NO
CAN YOU DO HEAVY WORK AROUND THE HOUSE LIKE SCRUBBING FLOORS OR LIFTING AND MOVING HEAVY FURNITURE: NO
TOTAL_SCORE: 10.7
CAN YOU DO LIGHT WORK AROUND THE HOUSE LIKE DUSTING OR WASHING DISHES: YES
CAN YOU DO YARD WORK LIKE RAKING LEAVES, WEEDING OR PUSHING A MOWER: NO

## 2024-06-12 ASSESSMENT — LIFESTYLE VARIABLES: SMOKING_STATUS: NONSMOKER

## 2024-06-12 ASSESSMENT — ACTIVITIES OF DAILY LIVING (ADL): ADL_SCORE: 1

## 2024-06-12 ASSESSMENT — PAIN - FUNCTIONAL ASSESSMENT: PAIN_FUNCTIONAL_ASSESSMENT: 0-10

## 2024-06-12 NOTE — CPM/PAT H&P
[facility] Evaluation       Name: Emanuel Lopez (Emanuel Lopez)  /Age: 1955/68 y.o.     In-Person       Chief Complaint: left groin discomforts    HPI    SS is a 69 yo male who underwent right inguinal hernia repair in 2024 and did well. Now he presents to [facility] for upcoming left inguinal hernia repair- left groin bulge has been there for quite [place] time. He denies any discomforts or bruising from this area. Otherwise denies any recent illness, fever/chills, chest pains or shortness of breath.     Past Medical History:   Diagnosis Date    Anemia     BPH (benign prostatic hyperplasia)     Delayed emergence from general anesthesia     Hypertension     Inguinal hernia of left side without obstruction or gangrene     Prostate cancer (Multi)      PCP: Dr. Alanis ([facility])  Uro: Dr. Light ([facility])    Lab Results   Component Value Date    WBC 5.7 2024    HGB 10.6 (L) 2024    HCT 32.8 (L) 2024    MCV 97 2024     2024     Lab Results   Component Value Date    GLUCOSE 105 (H) 2024    CALCIUM 8.9 2024     2024    K 3.8 2024    CO2 29 2024     2024    BUN 22 2024    CREATININE 1.05 2024        Past Surgical History:   Procedure Laterality Date    HERNIA REPAIR      PROSTATE SURGERY      biopsy       Patient Sexual activity questions deferred to the physician.    Family History   Problem Relation Name Age of Onset    Hypertension Mother      Cancer Father      Cancer Sister         No Known Allergies    Prior to Admission medications    Medication Sig Start Date End Date Taking? Authorizing Provider   amLODIPine (Norvasc) 10 mg tablet Take 1 tablet (10 mg) by mouth once daily.    Historical Provider, MD   losartan (Cozaar) 100 mg tablet Take 1 tablet (100 mg) by mouth once daily. 24   Historical Provider, MD   potassium chloride CR 10 mEq ER tablet Take 1 tablet (10 mEq) by mouth once daily.    Historical Provider, MD    tamsulosin (Flomax) 0.4 mg 24 hr capsule Take 1 capsule (0.4 mg) by mouth once daily.    Historical Provider, MD AVTAR MCLEOD   Constitutional: Negative for fever, chills, or sweats   ENMT: Negative for nasal discharge, congestion, ear pain, mouth pain, throat pain   Respiratory: Negative for cough, wheezing, shortness of breath   Cardiac: Negative for chest pain, dyspnea on exertion, palpitations     Gastrointestinal: Negative for nausea, vomiting, diarrhea, constipation, abdominal pain; + left groin hernia bulge    Genitourinary: Negative for dysuria, flank pain, frequency, hematuria   Musculoskeletal: Negative for decreased ROM, pain, swelling, weakness   Neurological: Negative for dizziness, confusion, headache  Psychiatric: Negative for mood changes   Skin: Negative for itching, rash, ulcer    Hematologic/Lymph: Negative for bruising, easy bleeding  Allergic/Immunologic: Negative itching, sneezing, swelling      Physical Exam  Constitutional:       Appearance: Normal appearance.   HENT:      Head: Normocephalic.      Mouth/Throat:      Mouth: Mucous membranes are moist.   Eyes:      Extraocular Movements: Extraocular movements intact.   Cardiovascular:      Rate and Rhythm: Normal rate and regular rhythm.   Pulmonary:      Effort: Pulmonary effort is normal.      Breath sounds: Normal breath sounds.   Abdominal:      General: Abdomen is flat.      Palpations: Abdomen is soft.      Hernia: A hernia is present. Hernia is present in the left inguinal area.   Musculoskeletal:         General: Normal range of motion.      Cervical back: Normal range of motion.   Skin:     General: Skin is warm and dry.   Neurological:      General: No focal deficit present.      Mental Status: He is alert.   Psychiatric:         Mood and Affect: Mood normal.          PAT AIRWAY:   Airway:     Neck ROM::  Full   Missing teeth from extractions  normal      Anesthesia:  Patient endorses delayed emergence    Visit Vitals  /88    Pulse 85   Temp 36 °C (96.8 °F) (Temporal)   Resp 12       DASI Risk Score      Flowsheet Row Most Recent Value   DASI SCORE 10.7   METS Score (Will be calculated only when all the questions are answered) 4.1          Caprini DVT Assessment      Flowsheet Row Most Recent Value   DVT Score 7   Current Status Major surgery planned, including arthroscopic and laproscopic (1-2 hours)   History Previous malignancy   Age 60-75 years   BMI 30 or less          Modified Frailty Index      Flowsheet Row Most Recent Value   Modified Frailty Index Calculator .0909          CHADS2 Stroke Risk  Current as of 16 minutes ago        N/A 3 to 100%: High Risk   2 to < 3%: Medium Risk   0 to < 2%: Low Risk     Last Change: N/A          This score determines the patient's risk of having a stroke if the patient has atrial fibrillation.        This score is not applicable to this patient. Components are not calculated.          Revised Cardiac Risk Index    No data to display       Apfel Simplified Score    No data to display       Risk Analysis Index Results This Encounter         6/12/2024  0806             BRIGHT Cancer History: Patient indicates history of cancer    Total Risk Analysis Index Score Without Cancer: 24    Total Risk Analysis Index Score: 38          Stop Bang Score      Flowsheet Row Most Recent Value   Do you snore loudly? 0   Do you often feel tired or fatigued after your sleep? 0   Has anyone ever observed you stop breathing in your sleep? 0   Do you have or are you being treated for high blood pressure? 1   Recent BMI (Calculated) 22.1   Is BMI greater than 35 kg/m2? 0=No   Age older than 50 years old? 1=Yes   Is your neck circumference greater than 17 inches (Male) or 16 inches (Female)? 0   Gender - Male 1=Yes   STOP-BANG Total Score 3            Assessment and Plan:     Pre-Op  68-year-old male scheduled for left inguinal hernia repair on 6/26/2024 Dr. Noe. Blood work and MRSA ordered- oral chlorahexidine  prescribed. Recent EKG on file from 5/10/2024, shows NSR with LVH. Otherwise no further orders indicated.     Cardiac  HTN- managed with Norvasc and Cozaar, remains high today secondary to nerves    Urology  Recent diagnosis of prostate cancer- plan for surgery soon   BPH    Anesthesia:  Patient endorses delayed emergence  -underwent right inguinal hernia repair in April 2024  ASA 2    See risk scores as previously documented.

## 2024-06-12 NOTE — PREPROCEDURE INSTRUCTIONS
Medication List            Accurate as of June 12, 2024  8:10 AM. Always use your most recent med list.                amLODIPine 10 mg tablet  Commonly known as: Norvasc  Medication Adjustments for Surgery: Take morning of surgery with sip of water, no other fluids     losartan 100 mg tablet  Commonly known as: Cozaar  Medication Adjustments for Surgery: Other (Comment)  Notes to patient: HOLD any evening dose the night before the day of surgery  HOLD the day of surgery  -should be discontinued for a minimum of 24 hours before surgery     potassium chloride CR 10 mEq ER tablet  Commonly known as: Klor-Con  Medication Adjustments for Surgery: Continue until night before surgery                                  PRE-OPERATIVE INSTRUCTIONS    You will receive notification one business day prior to your procedure to confirm your arrival time. It is important that you answer your phone and/or check your messages during this time. If you do not hear from the surgery center by 5 pm. the day before your procedure, please call [phone].     Please enter the building through the Outpatient entrance and take the elevator off the lobby to the 2nd floor then check in at the Outpatient Surgery desk on the 2nd floor.    INSTRUCTIONS:  Talk to your surgeon for instructions if you should stop your aspirin, blood thinner, or diabetes medicines.  DO NOT take any multivitamins or over the counter supplements for 7-10 days before surgery.  If not being admitted, you must have an adult immediately available to drive you home after surgery. We also highly recommend you have someone stay with you for the entire day and night of your surgery.  For children having surgery, a parent or legal guardian must accompany them to the surgery center. If this is not possible, please call [phone] to make additional arrangements.  For adults who are unable to consent or make medical decisions for themselves, a legal guardian or Power of   must accompany them to the surgery center. If this is not possible, please call [phone] to make additional arrangements.  Wear comfortable, loose fitting clothing.  All jewelry and piercings must be removed. If you are unable to remove an item or have a dermal piercing, please be sure to tell the nurse when you arrive for surgery.  Nail polish and make-up must be removed.  Avoid smoking or consuming alcohol for 24 hours before surgery.  To help prevent infection, please take a shower/bath and wash your hair the night before and/or morning of surgery (or follow other specific bathing instructions provided).    Preoperative Fasting Guidelines    Why must I stop eating and drinking near surgery time?  With sedation, food or liquid in your stomach can enter your lungs causing serious complications  Increases nausea and vomiting    When do I need to stop eating and drinking before my surgery?  Do not eat any solid food after midnight the night before your surgery/procedure unless otherwise instructed by your surgeon.   You may have up to 13.5 ounces of clear liquid until TWO hours before your instructed arrival time to the hospital.  This includes water, black tea/coffee, (no milk or cream) apple juice, and electrolyte drinks (Gatorade).   You may chew gum until TWO hours before your surgery/procedure      If applicable, notify your surgeons office immediately of any new skin changes that occur to the surgical limb.      If you have any questions or concerns, please call Pre-Admission Testing at [phone].

## 2024-06-14 LAB — STAPHYLOCOCCUS SPEC CULT: ABNORMAL

## 2024-06-24 RX ORDER — HEPARIN SODIUM 5000 [USP'U]/ML
5000 INJECTION, SOLUTION INTRAVENOUS; SUBCUTANEOUS
Status: CANCELLED | OUTPATIENT
Start: 2024-06-24

## 2024-06-24 RX ORDER — OXYCODONE AND ACETAMINOPHEN 5; 325 MG/1; MG/1
1 TABLET ORAL EVERY 6 HOURS PRN
Qty: 5 TABLET | Refills: 0 | Status: SHIPPED | OUTPATIENT
Start: 2024-06-24

## 2024-06-24 NOTE — H&P
Emanuel Lopez   [id number]   1955         Chief Complaint/    Left inguinal hernia              HPI/    68-year-old male presents for repair of left inguinal hernia    Patient initially presented with large bilateral inguinal hernias.  Patient has significant hernia on the right side.  He is convalescing repair of this.  He presents now for repair of the left side    Past Medical History:   Diagnosis Date    Anemia     BPH (benign prostatic hyperplasia)     Delayed emergence from general anesthesia     Hypertension     Inguinal hernia of left side without obstruction or gangrene     Prostate cancer (Multi)    Prostate cancer    Hypertension    Denies diabetes or coronary artery disease        Social History     Socioeconomic History    Marital status: Single     Spouse name: Not on file    Number of children: Not on file    Years of education: Not on file    Highest education level: Not on file   Occupational History    Not on file   Tobacco Use    Smoking status: Never    Smokeless tobacco: Never   Vaping Use    Vaping status: Never Used   Substance and Sexual Activity    Alcohol use: Never    Drug use: Never    Sexual activity: Defer   Other Topics Concern    Not on file   Social History Narrative    Not on file     Social Determinants of Health     Financial Resource Strain: Low Risk  (5/10/2024)    Overall Financial Resource Strain (CARDIA)     Difficulty of Paying Living Expenses: Not hard at all   Food Insecurity: No Food Insecurity (6/26/2023)    Received from [facility]    Hunger Vital Sign     Worried About Running Out of Food in the Last Year: Never true     Ran Out of Food in the Last Year: Never true   Transportation Needs: No Transportation Needs (5/12/2024)    PRAPARE - Transportation     Lack of Transportation (Medical): No     Lack of Transportation (Non-Medical): No   Physical Activity: Insufficiently Active (7/19/2023)    Received from [facility]  "   Exercise Vital Sign     Days of Exercise per Week: 1 day     Minutes of Exercise per Session: 10 min   Stress: Not on file   Social Connections: Not on file   Intimate Partner Violence: Not on file   Housing Stability: Low Risk  (5/12/2024)    Housing Stability Vital Sign     Unable to Pay for Housing in the Last Year: No     Number of Places Lived in the Last Year: 1     Unstable Housing in the Last Year: No      Non-smoker    Family History   Problem Relation Name Age of Onset    Hypertension Mother      Cancer Father      Cancer Sister          Review of Systems   All other systems reviewed and are negative.       No current facility-administered medications for this encounter.    Current Outpatient Medications:     amLODIPine (Norvasc) 10 mg tablet, Take 1 tablet (10 mg) by mouth once daily., Disp: , Rfl:     losartan (Cozaar) 100 mg tablet, Take 1 tablet (100 mg) by mouth once daily., Disp: , Rfl:     potassium chloride CR 10 mEq ER tablet, Take 1 tablet (10 mEq) by mouth once daily., Disp: , Rfl:        no      Xaralto  no      Eliquis  no      Coumadin  no      Plavix        No Known Allergies     ECG 12 lead (Ancillary Performed)  Normal sinus rhythm  Minimal voltage criteria for LVH, may be normal variant  Borderline ECG  When compared with ECG of 10-MAY-2024 11:26, (unconfirmed)  No significant change was found  Confirmed by Saeid Pinedo ([id number]) on 5/13/2024 11:10:24 PM       I have reviewed the images and the reports      Laboratory data:   CBC:   No results found for: \"WBC\", \"RBC\", \"HGB\", \"HCT\", \"PLT\"]   [facility] Labs:   No results found for: \"GLUF\", \"NA\", \"K\", \"CL\", \"CO2\", \"BUN\", \"CREATININE\", \"CALCIUM\", \"PROT\", \"ALBUMIN\", \"BILITOT\", \"BILIDIR\", \"ALKPHOS\", \"AST\", \"ALT\"       I have reviewed the above laboratory studies      There were no vitals taken for this visit.       Physical Exam  Constitutional:       Appearance: Normal appearance.   HENT:      Head: Normocephalic and atraumatic.   Cardiovascular: "      Rate and Rhythm: Normal rate and regular rhythm.   Pulmonary:      Effort: Pulmonary effort is normal.      Breath sounds: Normal breath sounds.   Abdominal:      Comments: Well-healed wound on the right groin    Left inguinal hernia noted, reduces   Musculoskeletal:      Cervical back: Normal range of motion.   Neurological:      General: No focal deficit present.      Mental Status: He is alert and oriented to person, place, and time.                  Assessment/    Left inguinal hernia    Plan/    Open repair under general anesthesia    Open repair of the inguinal hernia is reviewed.    The potential of bleeding, infection, MI, PE/DVT, death, etc. reviewed.  The anticipated convalescence is reviewed.  Use of mesh and prosthetic materials is reviewed.  The plan for postoperative pain management is reviewed.  All questions were answered and consent was obtained

## 2024-06-26 ENCOUNTER — HOSPITAL ENCOUNTER (OUTPATIENT)
Facility: HOSPITAL | Age: 69
Setting detail: OUTPATIENT SURGERY
Discharge: HOME | End: 2024-06-26
Attending: SURGERY | Admitting: SURGERY
Payer: MEDICARE

## 2024-06-26 ENCOUNTER — ANESTHESIA EVENT (OUTPATIENT)
Dept: OPERATING ROOM | Facility: HOSPITAL | Age: 69
End: 2024-06-26
Payer: MEDICARE

## 2024-06-26 ENCOUNTER — ANESTHESIA (OUTPATIENT)
Dept: OPERATING ROOM | Facility: HOSPITAL | Age: 69
End: 2024-06-26
Payer: MEDICARE

## 2024-06-26 VITALS
SYSTOLIC BLOOD PRESSURE: 158 MMHG | RESPIRATION RATE: 11 BRPM | WEIGHT: 175 LBS | DIASTOLIC BLOOD PRESSURE: 88 MMHG | HEIGHT: 72 IN | OXYGEN SATURATION: 97 % | HEART RATE: 80 BPM | TEMPERATURE: 96.8 F | BODY MASS INDEX: 23.7 KG/M2

## 2024-06-26 DIAGNOSIS — K40.91 UNILATERAL RECURRENT INGUINAL HERNIA WITHOUT OBSTRUCTION OR GANGRENE: ICD-10-CM

## 2024-06-26 DIAGNOSIS — K40.90 NON-RECURRENT UNILATERAL INGUINAL HERNIA WITHOUT OBSTRUCTION OR GANGRENE: Primary | ICD-10-CM

## 2024-06-26 PROCEDURE — 2720000007 HC OR 272 NO HCPCS: Performed by: SURGERY

## 2024-06-26 PROCEDURE — 3600000008 HC OR TIME - EACH INCREMENTAL 1 MINUTE - PROCEDURE LEVEL THREE: Performed by: SURGERY

## 2024-06-26 PROCEDURE — 7100000002 HC RECOVERY ROOM TIME - EACH INCREMENTAL 1 MINUTE: Performed by: SURGERY

## 2024-06-26 PROCEDURE — 2780000003 HC OR 278 NO HCPCS: Performed by: SURGERY

## 2024-06-26 PROCEDURE — 7100000009 HC PHASE TWO TIME - INITIAL BASE CHARGE: Performed by: SURGERY

## 2024-06-26 PROCEDURE — 2500000004 HC RX 250 GENERAL PHARMACY W/ HCPCS (ALT 636 FOR OP/ED): Performed by: SURGERY

## 2024-06-26 PROCEDURE — 7100000010 HC PHASE TWO TIME - EACH INCREMENTAL 1 MINUTE: Performed by: SURGERY

## 2024-06-26 PROCEDURE — 3700000002 HC GENERAL ANESTHESIA TIME - EACH INCREMENTAL 1 MINUTE: Performed by: SURGERY

## 2024-06-26 PROCEDURE — 3700000001 HC GENERAL ANESTHESIA TIME - INITIAL BASE CHARGE: Performed by: SURGERY

## 2024-06-26 PROCEDURE — 3600000003 HC OR TIME - INITIAL BASE CHARGE - PROCEDURE LEVEL THREE: Performed by: SURGERY

## 2024-06-26 PROCEDURE — 2500000005 HC RX 250 GENERAL PHARMACY W/O HCPCS: Performed by: NURSE ANESTHETIST, CERTIFIED REGISTERED

## 2024-06-26 PROCEDURE — 7100000001 HC RECOVERY ROOM TIME - INITIAL BASE CHARGE: Performed by: SURGERY

## 2024-06-26 PROCEDURE — 2500000004 HC RX 250 GENERAL PHARMACY W/ HCPCS (ALT 636 FOR OP/ED): Performed by: STUDENT IN AN ORGANIZED HEALTH CARE EDUCATION/TRAINING PROGRAM

## 2024-06-26 PROCEDURE — 2500000004 HC RX 250 GENERAL PHARMACY W/ HCPCS (ALT 636 FOR OP/ED): Mod: JZ | Performed by: SURGERY

## 2024-06-26 PROCEDURE — C1781 MESH (IMPLANTABLE): HCPCS | Performed by: SURGERY

## 2024-06-26 PROCEDURE — 2500000004 HC RX 250 GENERAL PHARMACY W/ HCPCS (ALT 636 FOR OP/ED): Performed by: NURSE ANESTHETIST, CERTIFIED REGISTERED

## 2024-06-26 PROCEDURE — 2500000001 HC RX 250 WO HCPCS SELF ADMINISTERED DRUGS (ALT 637 FOR MEDICARE OP): Performed by: STUDENT IN AN ORGANIZED HEALTH CARE EDUCATION/TRAINING PROGRAM

## 2024-06-26 PROCEDURE — 96372 THER/PROPH/DIAG INJ SC/IM: CPT | Performed by: SURGERY

## 2024-06-26 PROCEDURE — 49505 PRP I/HERN INIT REDUC >5 YR: CPT | Performed by: SURGERY

## 2024-06-26 DEVICE — BARD MESH
Type: IMPLANTABLE DEVICE | Site: GROIN | Status: FUNCTIONAL
Brand: BARD MESH

## 2024-06-26 RX ORDER — ONDANSETRON HYDROCHLORIDE 2 MG/ML
INJECTION, SOLUTION INTRAVENOUS AS NEEDED
Status: DISCONTINUED | OUTPATIENT
Start: 2024-06-26 | End: 2024-06-26

## 2024-06-26 RX ORDER — HYDRALAZINE HYDROCHLORIDE 20 MG/ML
5 INJECTION INTRAMUSCULAR; INTRAVENOUS EVERY 30 MIN PRN
Status: COMPLETED | OUTPATIENT
Start: 2024-06-26 | End: 2024-06-26

## 2024-06-26 RX ORDER — OXYCODONE HYDROCHLORIDE 5 MG/1
5 TABLET ORAL EVERY 4 HOURS PRN
Status: DISCONTINUED | OUTPATIENT
Start: 2024-06-26 | End: 2024-06-26 | Stop reason: HOSPADM

## 2024-06-26 RX ORDER — FENTANYL CITRATE 50 UG/ML
50 INJECTION, SOLUTION INTRAMUSCULAR; INTRAVENOUS EVERY 5 MIN PRN
Status: DISCONTINUED | OUTPATIENT
Start: 2024-06-26 | End: 2024-06-26 | Stop reason: HOSPADM

## 2024-06-26 RX ORDER — SODIUM CHLORIDE, SODIUM LACTATE, POTASSIUM CHLORIDE, CALCIUM CHLORIDE 600; 310; 30; 20 MG/100ML; MG/100ML; MG/100ML; MG/100ML
100 INJECTION, SOLUTION INTRAVENOUS CONTINUOUS
Status: DISCONTINUED | OUTPATIENT
Start: 2024-06-26 | End: 2024-06-26 | Stop reason: HOSPADM

## 2024-06-26 RX ORDER — PROPOFOL 10 MG/ML
INJECTION, EMULSION INTRAVENOUS AS NEEDED
Status: DISCONTINUED | OUTPATIENT
Start: 2024-06-26 | End: 2024-06-26

## 2024-06-26 RX ORDER — LABETALOL HYDROCHLORIDE 5 MG/ML
5 INJECTION, SOLUTION INTRAVENOUS ONCE AS NEEDED
Status: DISCONTINUED | OUTPATIENT
Start: 2024-06-26 | End: 2024-06-26 | Stop reason: HOSPADM

## 2024-06-26 RX ORDER — LIDOCAINE HYDROCHLORIDE 10 MG/ML
0.1 INJECTION INFILTRATION; PERINEURAL ONCE
Status: DISCONTINUED | OUTPATIENT
Start: 2024-06-26 | End: 2024-06-26 | Stop reason: HOSPADM

## 2024-06-26 RX ORDER — ROCURONIUM BROMIDE 10 MG/ML
INJECTION, SOLUTION INTRAVENOUS AS NEEDED
Status: DISCONTINUED | OUTPATIENT
Start: 2024-06-26 | End: 2024-06-26

## 2024-06-26 RX ORDER — MIDAZOLAM HYDROCHLORIDE 1 MG/ML
INJECTION, SOLUTION INTRAMUSCULAR; INTRAVENOUS AS NEEDED
Status: DISCONTINUED | OUTPATIENT
Start: 2024-06-26 | End: 2024-06-26

## 2024-06-26 RX ORDER — LIDOCAINE HYDROCHLORIDE 20 MG/ML
INJECTION, SOLUTION INFILTRATION; PERINEURAL AS NEEDED
Status: DISCONTINUED | OUTPATIENT
Start: 2024-06-26 | End: 2024-06-26

## 2024-06-26 RX ORDER — ONDANSETRON HYDROCHLORIDE 2 MG/ML
4 INJECTION, SOLUTION INTRAVENOUS ONCE AS NEEDED
Status: DISCONTINUED | OUTPATIENT
Start: 2024-06-26 | End: 2024-06-26 | Stop reason: HOSPADM

## 2024-06-26 RX ORDER — HEPARIN SODIUM 5000 [USP'U]/ML
5000 INJECTION, SOLUTION INTRAVENOUS; SUBCUTANEOUS ONCE
Status: COMPLETED | OUTPATIENT
Start: 2024-06-26 | End: 2024-06-26

## 2024-06-26 RX ORDER — FENTANYL CITRATE 50 UG/ML
INJECTION, SOLUTION INTRAMUSCULAR; INTRAVENOUS AS NEEDED
Status: DISCONTINUED | OUTPATIENT
Start: 2024-06-26 | End: 2024-06-26

## 2024-06-26 RX ORDER — ALBUTEROL SULFATE 0.83 MG/ML
2.5 SOLUTION RESPIRATORY (INHALATION) ONCE AS NEEDED
Status: DISCONTINUED | OUTPATIENT
Start: 2024-06-26 | End: 2024-06-26 | Stop reason: HOSPADM

## 2024-06-26 RX ORDER — CEFAZOLIN SODIUM 2 G/100ML
2 INJECTION, SOLUTION INTRAVENOUS
Status: COMPLETED | OUTPATIENT
Start: 2024-06-26 | End: 2024-06-26

## 2024-06-26 SDOH — HEALTH STABILITY: MENTAL HEALTH: CURRENT SMOKER: 0

## 2024-06-26 ASSESSMENT — PAIN SCALES - GENERAL
PAINLEVEL_OUTOF10: 5 - MODERATE PAIN
PAINLEVEL_OUTOF10: 2
PAINLEVEL_OUTOF10: 0 - NO PAIN
PAINLEVEL_OUTOF10: 2
PAINLEVEL_OUTOF10: 2
PAINLEVEL_OUTOF10: 0 - NO PAIN
PAINLEVEL_OUTOF10: 0 - NO PAIN
PAINLEVEL_OUTOF10: 2
PAIN_LEVEL: 0

## 2024-06-26 ASSESSMENT — PAIN - FUNCTIONAL ASSESSMENT
PAIN_FUNCTIONAL_ASSESSMENT: 0-10
PAIN_FUNCTIONAL_ASSESSMENT: 0-10
PAIN_FUNCTIONAL_ASSESSMENT: UNABLE TO SELF-REPORT
PAIN_FUNCTIONAL_ASSESSMENT: 0-10

## 2024-06-26 NOTE — ANESTHESIA POSTPROCEDURE EVALUATION
Patient: Emanuel Lopez    Procedure Summary       Date: 06/26/24 Room / Location: [facility] OR 02 / [facility] OR    Anesthesia Start: 1432 Anesthesia Stop: 1543    Procedure: Repair Inguinal Hernia (Left: Groin) Diagnosis:       Non-recurrent unilateral inguinal hernia without obstruction or gangrene      (Non-recurrent unilateral inguinal hernia without obstruction or gangrene [K40.90])    Surgeons: Eleazar Noe MD Responsible Provider: Sun Posey MD    Anesthesia Type: general ASA Status: 3            Anesthesia Type: general    Vitals Value Taken Time   /92 06/26/24 1700   Temp 36 °C (96.8 °F) 06/26/24 1539   Pulse 87 06/26/24 1700   Resp 14 06/26/24 1700   SpO2 100 % 06/26/24 1700       Anesthesia Post Evaluation    Patient location during evaluation: PACU  Patient participation: complete - patient participated  Level of consciousness: sleepy but conscious, responsive to verbal stimuli, responsive to light touch, sedated and responsive to physical stimuli  Pain score: 0  Pain management: adequate  Multimodal analgesia pain management approach  Airway patency: patent  Two or more strategies used to mitigate risk of obstructive sleep apnea  Cardiovascular status: acceptable, hemodynamically stable and stable  Respiratory status: acceptable, unassisted, spontaneous ventilation and nonlabored ventilation  Hydration status: balanced  Postoperative Nausea and Vomiting: none        There were no known notable events for this encounter.

## 2024-06-26 NOTE — BRIEF OP NOTE
Date: 2024  OR Location: [facility] OR    Name: Emanuel Lopez, : 1955, Age: 68 y.o., MRN: [id number], Sex: male    Diagnosis  Pre-op Diagnosis     * Non-recurrent unilateral inguinal hernia without obstruction or gangrene [K40.90] Post-op Diagnosis     * Non-recurrent unilateral inguinal hernia without obstruction or gangrene [K40.90]     Procedures  Repair Inguinal Hernia  [id number] - [place] RPR 1ST INGUN HRNA AGE 5 YRS/> REDUCIBLE      Surgeons      * Eleazar Noe - Primary    Resident/Fellow/Other Assistant:  Surgeons and Role:  * No surgeons found with a matching role *    Procedure Summary  Anesthesia: General  ASA: III  Anesthesia Staff: Anesthesiologist: Sun Posey MD  CRNA: IVETH Hunter-CRNA  Estimated Blood Loss: Normal mL  Intra-op Medications:   Administrations occurring from 1340 to 1505 on 24:   Medication Name Total Dose   ceFAZolin in dextrose (iso-os) (Ancef) IVPB 2 g 2 g              Anesthesia Record               Intraprocedure I/O Totals          Intake    LR bolus 700.00 mL    Total Intake 700 mL          Specimen: No specimens collected     Staff:   Scrub Person:   Ariaulator: Jovon  Circulator: Darcy          Findings: Large direct    Complications:  None; patient tolerated the procedure well.     Disposition: PACU - hemodynamically stable.  Condition: stable  Specimens Collected: No specimens collected  Attending Attestation: I performed the procedure.    Eleazar Noe  Phone Number: [phone]

## 2024-06-26 NOTE — OP NOTE
Repair Inguinal Hernia (L) Operative Note     Date: 2024  OR Location: [facility] OR    Name: Emanuel Lopez, : 1955, Age: 68 y.o., MRN: [id number], Sex: male    Diagnosis  Pre-op Diagnosis     * Non-recurrent unilateral inguinal hernia without obstruction or gangrene [K40.90] Post-op Diagnosis     * Non-recurrent unilateral inguinal hernia without obstruction or gangrene [K40.90]     Procedures  Repair Inguinal Hernia  47146 - DC RPR 1ST INGUN HRNA AGE 5 YRS/> REDUCIBLE      Surgeons      * Eleazar Noe - Primary    Resident/Fellow/Other Assistant:  Surgeons and Role:  * No surgeons found with a matching role *    Procedure Summary  Anesthesia: General  ASA: III  Anesthesia Staff: Anesthesiologist: Sun Posey MD  CRNA: IVETH Hunter-CRNA  Estimated Blood Loss: Minimal mL  Intra-op Medications:   Administrations occurring from 1340 to 1505 on 24:   Medication Name Total Dose   ceFAZolin in dextrose (iso-os) (Ancef) IVPB 2 g 2 g              Anesthesia Record               Intraprocedure I/O Totals          Intake    LR bolus 700.00 mL    Total Intake 700 mL          Specimen: No specimens collected     Staff:   Scrub Person:   Circulator: Jovon  Circulator: Darcy         Drains and/or Catheters: * None in log *    Tourniquet Times:         Implants:  Implants       Type Name Action Serial No.      Surgical Mesh Sling Implant PATCH, MESH, MARLEX, 3 X 6 IN, POLYPROPYLENE - [id number] Implanted            Patient is 68-year-old presents for repair of left inguinal hernia.    Risk benefits indications for this reviewed with him.  Potential bleeding infection MI PE death etc. reviewed.  The anticipated convalescence is reviewed.  Use of mesh and prosthetic materials is reviewed.  Questions were answered and consent is obtained.    Patient seen to the operating room and general anesthesia obtained.  Left groin is prepped and draped in a sterile fashion.  Timeout procedures were  followed.  Antibiotics were given.  DVT prophylaxis obtained using subcutaneous heparin and placement of external pneumatic compression stockings.    Left inguinal hernia incision is made and dissection carried down to the skin subcutaneous tissues and Parveen's fascia to level the external oblique.  The external bleak was opened direction of the fibers and the cord mobilized    Port is extremely thickened to the greater than an inch in diameter.  Careful dissection was performed and the hernial mass is  from the cord structures.    The hernia is entirely direct in character.  It extended through the external ring and all into the upper scrotum    The cord itself is now dissected there is no indirect sac.  There is no significant lipoma    Loose pursestring stitch placed along the edge of the direct defect and held in reduced position.  Pursestring stitch is now secured    A 3 x 6 inch piece of flat polypropylene mesh was brought onto the field.  Sinker to pubic tubercle and Alexys's ligament and the lateral edge of the conjoined tendon using multiple interrupted 0 Prolene stitches.  Double-armed 2-0 Prolene is used to further fix the mesh to the tubercle.  1 arm of the 2-0 Prolene used to anchor the mesh in a running fashion to the iliopubic tract and shelving edge of the ligament.  Second arm of the 2-0 Prolene used to anchor the mesh in a running fashion to the lateral edge of the conjoined tendon the superior margin and transversalis arch.  Laterally a transverse slit was created the mesh so as to allow passage of the cord.  Defect in the mesh was tightened with several interrupted Prolene sutures    The repair was inspected.  Hemostasis was good.  Counts reported as correct.  The external oblique is closed with 2-0 Vicryl.  Subcutaneous tissues were closed with 3-0 Vicryl.  Skin was closed with staples.    Patient tolerated the procedure well               Eleazar Noe  Phone Number: [phone]

## 2024-06-26 NOTE — ANESTHESIA PREPROCEDURE EVALUATION
Patient: Emanuel Lopez    Procedure Information       Date/Time: 06/26/24 1340    Procedure: Repair Inguinal Hernia (Left)    Location: [facility] OR 02 / [facility]    Surgeons: Eleazar Noe MD            Relevant Problems   Anesthesia (within normal limits)      Digestive   (+) Non-recurrent unilateral inguinal hernia without obstruction or gangrene   (+) Unilateral inguinal hernia without obstruction or gangrene       Clinical information reviewed:    Allergies  Meds               NPO Detail:  No data recorded     Physical Exam    Airway  Mallampati: II  TM distance: >3 FB  Neck ROM: full     Cardiovascular   Rhythm: regular  Rate: normal     Dental    Pulmonary   Breath sounds clear to auscultation  (+) decreased breath sounds     Abdominal   Abdomen: soft       Other findings: Dental misalignment          Anesthesia Plan    History of general anesthesia?: yes  History of complications of general anesthesia?: no    ASA 3     general     The patient is not a current smoker.  Patient was previously instructed to abstain from smoking on day of procedure.  Patient did not smoke on day of procedure.  Education provided regarding risk of obstructive sleep apnea.  intravenous induction   Postoperative administration of opioids is intended.  Anesthetic plan and risks discussed with patient.  Use of blood products discussed with patient who consented to blood products.    Plan discussed with CAA, CRNA and attending.

## 2024-06-26 NOTE — ANESTHESIA PROCEDURE NOTES
Airway  Date/Time: 6/26/2024 2:43 PM  Urgency: elective    Airway not difficult    Staffing  Performed: attending   Authorized by: Sun Posey MD    Performed by: IVETH Hunter-LISA  Patient location during procedure: OR    Indications and Patient Condition  Indications for airway management: anesthesia and airway protection  Spontaneous ventilation: present  Sedation level: deep  Preoxygenated: yes  Patient position: sniffing  MILS maintained throughout  Mask difficulty assessment: 1 - vent by mask  No planned trial extubation    Final Airway Details  Final airway type: endotracheal airway      Successful airway: ETT  Cuffed: yes   Successful intubation technique: direct laryngoscopy  Facilitating devices/methods: intubating stylet  Endotracheal tube insertion site: oral  Blade: Carrie  Blade size: #4  ETT size (mm): 7.5  Cormack-Lehane Classification: grade I - full view of glottis  Placement verified by: chest auscultation and capnometry   Measured from: lips  Number of attempts at approach: 1  Ventilation between attempts: none  Number of other approaches attempted: 0

## 2024-07-09 ENCOUNTER — APPOINTMENT (OUTPATIENT)
Dept: SURGERY | Facility: CLINIC | Age: 69
End: 2024-07-09
Payer: MEDICARE

## 2024-07-09 VITALS
HEART RATE: 88 BPM | OXYGEN SATURATION: 98 % | TEMPERATURE: 98 F | DIASTOLIC BLOOD PRESSURE: 106 MMHG | SYSTOLIC BLOOD PRESSURE: 168 MMHG | RESPIRATION RATE: 16 BRPM

## 2024-07-09 DIAGNOSIS — K40.90 NON-RECURRENT UNILATERAL INGUINAL HERNIA WITHOUT OBSTRUCTION OR GANGRENE: Primary | ICD-10-CM

## 2024-07-09 PROCEDURE — 1159F MED LIST DOCD IN RCRD: CPT | Performed by: SURGERY

## 2024-07-09 PROCEDURE — 99024 POSTOP FOLLOW-UP VISIT: CPT | Performed by: SURGERY

## 2024-07-09 NOTE — PROGRESS NOTES
Chief complaint/    Postop check        HPI/    68-year-old male status post 6/26/2024 repair of left inguinal hernia    Patient convalescing uneventfully.  This is a much easier convalescence as compared to the right sided repair several months ago    Ambulating at baseline.  Eating normally.  GI and  function at baseline    Denies any wound healing problems        Physical exam/    Left groin wound is clean and dry without redness or drainage.  No swelling noted.  Staples are removed        Assessment/    Doing well after repair of left inguinal hernia        Plan/    RTC as needed

## 2024-07-12 DIAGNOSIS — I10 PRIMARY HYPERTENSION: ICD-10-CM

## 2024-07-12 NOTE — TELEPHONE ENCOUNTER
Please approve or deny request. Thank you!    Rx requested:  Requested Prescriptions     Pending Prescriptions Disp Refills    KLOR-CON M10 10 MEQ extended release tablet [Pharmacy Med Name: KLOR-CON M10 TABLET] 30 tablet 3     Sig: TAKE 1 TABLET BY MOUTH EVERY DAY         Last Office Visit:   4/16/2024      Next Visit Date:  Future Appointments   Date Time Provider Department Center   8/29/2024 10:15 AM Stephan Gould MD [facility]

## 2024-07-13 RX ORDER — POTASSIUM CHLORIDE 750 MG/1
10 TABLET, EXTENDED RELEASE ORAL DAILY
Qty: 30 TABLET | Refills: 3 | Status: SHIPPED | OUTPATIENT
Start: 2024-07-13

## 2024-08-13 ENCOUNTER — APPOINTMENT (OUTPATIENT)
Dept: UROLOGY | Facility: CLINIC | Age: 69
End: 2024-08-13
Payer: MEDICARE

## 2024-08-13 VITALS
BODY MASS INDEX: 23.3 KG/M2 | TEMPERATURE: 98.4 F | WEIGHT: 172 LBS | DIASTOLIC BLOOD PRESSURE: 95 MMHG | HEIGHT: 72 IN | SYSTOLIC BLOOD PRESSURE: 172 MMHG

## 2024-08-13 DIAGNOSIS — C61 PROSTATE CANCER (MULTI): Primary | ICD-10-CM

## 2024-08-13 DIAGNOSIS — N40.0 BPH WITH ELEVATED PSA: ICD-10-CM

## 2024-08-13 DIAGNOSIS — R97.20 BPH WITH ELEVATED PSA: ICD-10-CM

## 2024-08-13 PROCEDURE — 1159F MED LIST DOCD IN RCRD: CPT | Performed by: UROLOGY

## 2024-08-13 PROCEDURE — 3008F BODY MASS INDEX DOCD: CPT | Performed by: UROLOGY

## 2024-08-13 PROCEDURE — 99214 OFFICE O/P EST MOD 30 MIN: CPT | Performed by: UROLOGY

## 2024-08-13 PROCEDURE — G2211 COMPLEX E/M VISIT ADD ON: HCPCS | Performed by: UROLOGY

## 2024-08-13 RX ORDER — CEFAZOLIN SODIUM 2 G/100ML
2 INJECTION, SOLUTION INTRAVENOUS ONCE
OUTPATIENT
Start: 2024-08-13 | End: 2024-08-13

## 2024-08-13 RX ORDER — SODIUM CHLORIDE 9 MG/ML
100 INJECTION, SOLUTION INTRAVENOUS CONTINUOUS
OUTPATIENT
Start: 2024-08-13

## 2024-08-13 RX ORDER — HEPARIN SODIUM 5000 [USP'U]/ML
5000 INJECTION, SOLUTION INTRAVENOUS; SUBCUTANEOUS ONCE
OUTPATIENT
Start: 2024-08-13 | End: 2024-08-13

## 2024-08-13 NOTE — PROGRESS NOTES
Subjective   Patient ID: Emanuel Lopez is a 68 y.o. male who presents for Prostate Cancer. Last seen 4/16/24 when Biopsy findings of prostatic adenocarcinoma with samples of [place] 6, GG 1, upto 50% of tissues samples were involved. Patient has discussed his treatment options with Dr. Whitley and has decided he would like to proceed with RALP.   We discussed the risks, benefits, and alternatives to the procedure. We also discussed postop expectations and recovery. I explained that I do not perform hernia repairs as a surgeon and I do not like to combine the surgeries. It can lead to serious complications.   After PE, I think we should perform hernia surgery before the prostatectomy. I explained to the patient that his cancer is low-grade and the hernia has the potential to be emergent. I would like him to consult with Dr. Noe. Referral to Dr. Eleazar Noe. I will see the patient back after he has seen Dr. Noe to schedule RALP.   Patient is s/p left Repair Inguinal Hernia on 6/26/24.    HPI  The patient is accompanied by his daughter.  Patient has had his hernias repaired on April 26 an June 26.     Patient is wearing Depends and has urine leakage.   Patient denies heart and lung problems. His BP is 172/95.     Review of Systems  A 12 system review was completed and is negative with the exception of those signs and symptoms noted in the history of present illness.    Objective   Physical Exam  General: in NAD, appears stated age  Head: normocephalic, atraumatic  Respiratory: normal effort, no use of accessory muscles  Cardiovascular: no edema noted  Skin: normal turgor, no rashes  Neurologic: grossly intact, oriented to person/place/time  Psychiatric: mode and affect appropriate   Hernia Incisions: Clean, dry, and well-healed.    Assessment/Plan   Problem List Items Addressed This Visit             ICD-10-CM    Prostate cancer (Multi) - Primary C61    Relevant Orders    Case Request Operating Room: Resection  Robot-Assisted Prostate (Completed)    Urine Culture    Basic Metabolic Panel    CBC    ECG 12 lead    Request for Pre-Admission Testing Visit     Other Visit Diagnoses         Codes    BPH with elevated PSA     N40.0, R97.20    Relevant Orders    Urine Culture          We discussed the RALP surgical procedure. We discussed the risks, benefits, and alternatives to the procedure. We also discussed the postop care and recovery expectations.  Patient understands that he will likely continue to have problems with urinary leakage after the surgery. Schedule prostatectomy.     Follow-up with RALP for continued management of prostate cancer.    Scribe Attestation  By signing my name below, I, Meghan Rosas   attest that this documentation has been prepared under the direction and in the presence of Lyle Irby MD.

## 2024-08-16 DIAGNOSIS — I10 PRIMARY HYPERTENSION: ICD-10-CM

## 2024-08-16 RX ORDER — AMLODIPINE BESYLATE 10 MG/1
10 TABLET ORAL DAILY
Qty: 30 TABLET | Refills: 5 | Status: SHIPPED | OUTPATIENT
Start: 2024-08-16

## 2024-09-04 DIAGNOSIS — N40.1 BENIGN PROSTATIC HYPERPLASIA WITH URINARY FREQUENCY: ICD-10-CM

## 2024-09-04 DIAGNOSIS — R35.0 BENIGN PROSTATIC HYPERPLASIA WITH URINARY FREQUENCY: ICD-10-CM

## 2024-09-04 RX ORDER — TERAZOSIN 1 MG/1
CAPSULE ORAL
Qty: 30 CAPSULE | Refills: 5 | Status: SHIPPED | OUTPATIENT
Start: 2024-09-04

## 2024-09-04 NOTE — TELEPHONE ENCOUNTER
Future Appointments    This patient does not currently have any appointments scheduled.  Past Visits    Date Provider Specialty Visit Type Primary Dx   04/16/2024 Tari Hampton PA-C Family Medicine Office Visit Prostate cancer (HCC)

## 2024-09-07 RX ORDER — TERAZOSIN 1 MG/1
1 CAPSULE ORAL NIGHTLY
COMMUNITY

## 2024-09-09 ENCOUNTER — PRE-ADMISSION TESTING (OUTPATIENT)
Dept: PREADMISSION TESTING | Facility: HOSPITAL | Age: 69
End: 2024-09-09
Payer: MEDICARE

## 2024-09-09 ENCOUNTER — LAB (OUTPATIENT)
Dept: LAB | Facility: LAB | Age: 69
End: 2024-09-09
Payer: MEDICARE

## 2024-09-09 VITALS
OXYGEN SATURATION: 98 % | HEART RATE: 77 BPM | HEIGHT: 72 IN | RESPIRATION RATE: 16 BRPM | BODY MASS INDEX: 23.05 KG/M2 | SYSTOLIC BLOOD PRESSURE: 156 MMHG | DIASTOLIC BLOOD PRESSURE: 93 MMHG | TEMPERATURE: 97.9 F | WEIGHT: 170.19 LBS

## 2024-09-09 DIAGNOSIS — N40.0 BPH WITH ELEVATED PSA: ICD-10-CM

## 2024-09-09 DIAGNOSIS — R97.20 BPH WITH ELEVATED PSA: ICD-10-CM

## 2024-09-09 DIAGNOSIS — C61 PROSTATE CANCER (MULTI): ICD-10-CM

## 2024-09-09 DIAGNOSIS — Z01.818 PREOP EXAMINATION: Primary | ICD-10-CM

## 2024-09-09 LAB
ANION GAP SERPL CALC-SCNC: 14 MMOL/L (ref 10–20)
BUN SERPL-MCNC: 23 MG/DL (ref 6–23)
CALCIUM SERPL-MCNC: 9.7 MG/DL (ref 8.6–10.3)
CHLORIDE SERPL-SCNC: 103 MMOL/L (ref 98–107)
CO2 SERPL-SCNC: 28 MMOL/L (ref 21–32)
CREAT SERPL-MCNC: 1.13 MG/DL (ref 0.5–1.3)
EGFRCR SERPLBLD CKD-EPI 2021: 70 ML/MIN/1.73M*2
ERYTHROCYTE [DISTWIDTH] IN BLOOD BY AUTOMATED COUNT: 12.1 % (ref 11.5–14.5)
GLUCOSE SERPL-MCNC: 90 MG/DL (ref 74–99)
HCT VFR BLD AUTO: 39.8 % (ref 41–52)
HGB BLD-MCNC: 13.1 G/DL (ref 13.5–17.5)
MCH RBC QN AUTO: 31.5 PG (ref 26–34)
MCHC RBC AUTO-ENTMCNC: 32.9 G/DL (ref 32–36)
MCV RBC AUTO: 96 FL (ref 80–100)
NRBC BLD-RTO: 0 /100 WBCS (ref 0–0)
PLATELET # BLD AUTO: 219 X10*3/UL (ref 150–450)
POTASSIUM SERPL-SCNC: 4 MMOL/L (ref 3.5–5.3)
RBC # BLD AUTO: 4.16 X10*6/UL (ref 4.5–5.9)
SODIUM SERPL-SCNC: 141 MMOL/L (ref 136–145)
WBC # BLD AUTO: 5 X10*3/UL (ref 4.4–11.3)

## 2024-09-09 PROCEDURE — 87086 URINE CULTURE/COLONY COUNT: CPT

## 2024-09-09 PROCEDURE — 99202 OFFICE O/P NEW SF 15 MIN: CPT

## 2024-09-09 PROCEDURE — 85027 COMPLETE CBC AUTOMATED: CPT

## 2024-09-09 PROCEDURE — 36415 COLL VENOUS BLD VENIPUNCTURE: CPT

## 2024-09-09 PROCEDURE — 80048 BASIC METABOLIC PNL TOTAL CA: CPT

## 2024-09-09 ASSESSMENT — DUKE ACTIVITY SCORE INDEX (DASI)
CAN YOU PARTICIPATE IN MODERATE RECREATIONAL ACTIVITIES LIKE GOLF, BOWLING, DANCING, DOUBLES TENNIS OR THROWING A BASEBALL OR FOOTBALL: NO
CAN YOU PARTICIPATE IN STRENOUS SPORTS LIKE SWIMMING, SINGLES TENNIS, FOOTBALL, BASKETBALL, OR SKIING: NO
CAN YOU CLIMB A FLIGHT OF STAIRS OR WALK UP A HILL: YES
CAN YOU DO HEAVY WORK AROUND THE HOUSE LIKE SCRUBBING FLOORS OR LIFTING AND MOVING HEAVY FURNITURE: NO
CAN YOU WALK A BLOCK OR TWO ON LEVEL GROUND: YES
CAN YOU DO LIGHT WORK AROUND THE HOUSE LIKE DUSTING OR WASHING DISHES: YES
CAN YOU HAVE SEXUAL RELATIONS: NO
CAN YOU WALK INDOORS, SUCH AS AROUND YOUR HOUSE: YES
DASI METS SCORE: 5.1
CAN YOU DO MODERATE WORK AROUND THE HOUSE LIKE VACUUMING, SWEEPING FLOORS OR CARRYING GROCERIES: YES
CAN YOU TAKE CARE OF YOURSELF (EAT, DRESS, BATHE, OR USE TOILET): YES
CAN YOU RUN A SHORT DISTANCE: NO
TOTAL_SCORE: 18.95
CAN YOU DO YARD WORK LIKE RAKING LEAVES, WEEDING OR PUSHING A MOWER: NO

## 2024-09-09 ASSESSMENT — PAIN - FUNCTIONAL ASSESSMENT: PAIN_FUNCTIONAL_ASSESSMENT: 0-10

## 2024-09-09 ASSESSMENT — ACTIVITIES OF DAILY LIVING (ADL): ADL_SCORE: 1

## 2024-09-09 ASSESSMENT — LIFESTYLE VARIABLES: SMOKING_STATUS: NONSMOKER

## 2024-09-09 NOTE — PREPROCEDURE INSTRUCTIONS
Medication List            Accurate as of September 9, 2024  7:54 AM. Always use your most recent med list.                amLODIPine 10 mg tablet  Commonly known as: Norvasc  Medication Adjustments for Surgery: Take/Use as prescribed     losartan 100 mg tablet  Commonly known as: Cozaar  Additional Medication Adjustments for Surgery: Other (Comment)  Notes to patient: HOLD any evening dose the night before the day of surgery  HOLD the day of surgery     oxyCODONE-acetaminophen 5-325 mg tablet  Commonly known as: Percocet  Take 1 tablet by mouth every 6 hours if needed for severe pain (7 - 10).  Medication Adjustments for Surgery: Take/Use as prescribed     potassium chloride CR 10 mEq ER tablet  Commonly known as: Klor-Con  Medication Adjustments for Surgery: Do Not take on the morning of surgery     terazosin 1 mg capsule  Commonly known as: Hytrin  Medication Adjustments for Surgery: Take/Use as prescribed                                  PRE-OPERATIVE INSTRUCTIONS    You will receive notification one business day prior to your procedure to confirm your arrival time. It is important that you answer your phone and/or check your messages during this time. If you do not hear from the surgery center by 5 pm. the day before your procedure, please call [phone].     Please enter the building through the Outpatient entrance and take the elevator off the lobby to the 2nd floor then check in at the Outpatient Surgery desk on the 2nd floor.    INSTRUCTIONS:  Talk to your surgeon for instructions if you should stop your aspirin, blood thinner, or diabetes medicines.  DO NOT take any multivitamins or over the counter supplements for 7-10 days before surgery.  If not being admitted, you must have an adult immediately available to drive you home after surgery. We also highly recommend you have someone stay with you for the entire day and night of your surgery.  For children having surgery, a parent or legal guardian  must accompany them to the surgery center. If this is not possible, please call [phone] to make additional arrangements.  For adults who are unable to consent or make medical decisions for themselves, a legal guardian or Power of  must accompany them to the surgery center. If this is not possible, please call [phone] to make additional arrangements.  Wear comfortable, loose fitting clothing.  All jewelry and piercings must be removed. If you are unable to remove an item or have a dermal piercing, please be sure to tell the nurse when you arrive for surgery.  Nail polish and make-up must be removed.  Avoid smoking or consuming alcohol for 24 hours before surgery.  To help prevent infection, please take a shower/bath and wash your hair the night before and/or morning of surgery (or follow other specific bathing instructions provided).    Preoperative Fasting Guidelines    Why must I stop eating and drinking near surgery time?  With sedation, food or liquid in your stomach can enter your lungs causing serious complications  Increases nausea and vomiting    When do I need to stop eating and drinking before my surgery?  Do not eat any solid food after midnight the night before your surgery/procedure unless otherwise instructed by your surgeon.   You may have up to 13.5 ounces of clear liquid until TWO hours before your instructed arrival time to the hospital.  This includes water, black tea/coffee, (no milk or cream) apple juice, and electrolyte drinks (Gatorade).   You may chew gum until TWO hours before your surgery/procedure      If applicable, notify your surgeons office immediately of any new skin changes that occur to the surgical limb.      If you have any questions or concerns, please call Pre-Admission Testing at [phone].       Home Preoperative Antibacterial Shower with Chlorhexidine gluconate (CHG)     What is a home preoperative antibacterial shower?  This shower is a way of cleaning  the skin with a germ killing solution before surgery. The solution contains chlorhexidine gluconate, commonly known as CHG. CHG is a skin cleanser with germ killing ability. Let your doctor know if you are allergic to chlorhexidine.    Why do I need to take a preoperative antibacterial shower?  Skin is not sterile. It is best to try to make your skin as free of germs as possible before surgery. Proper cleansing with a germ killing soap before surgery can lower the number of germs on your skin. This helps to reduce the risk of infection at the surgical site. Following the instructions listed below will help you prepare your skin for surgery.    How do I use the solution?  Begin using your CHG soap the night before and again the morning of your procedure.   Do not shave the day before or day of surgery.  Remove all jewelry until after surgery. Take off rings and take out all body-piercing jewelry.  Wash your face and hair with normal soap and shampoo before you use the CHG soap.  Apply the CHG solution to a clean wet washcloth. Move away from the water to avoid premature rinsing of the CHG soap as you are applying. Firmly lather your entire body from the neck down. Do not use CHG on your face, eyes, ears, or genitals.   Pay special attention to the area where your incisions will be located.  Do not scrub your skin too hard.  It is important to allow the CHG soap to sit on your skin for 3-5 minutes.  Rinse the solution off your body completely. Do not wash with your normal soap after the CHG soap solution.  Pat yourself dry with a clean, soft towel.  Do not apply powders, lotions or deodorants as these might block how the CHG soap works.   Dress in clean clothing.  Be sure to sleep with clean, freshly laundered sheets.  Be aware that CHG can cause stains on fabric. Rinse your washcloth and other linens that have contact with CHG completely. Use only non-chlorine detergents to launder the items used.

## 2024-09-09 NOTE — CPM/PAT H&P
CPM/PAT Evaluation       Name: Emanuel Lopez)  /Age: 1955/68 y.o.     In-Person       Chief Complaint: Prostate cancer     HPI  Pleasant 69 y/o male presents with prostate cancer. He was referred for a prostate biopsy after reporting urinary frequency, urgency and incontinence. Denies dysuria or hematuria. Denies other associated symptoms. Denies recent fever/illness/chills.     Past Medical History:   Diagnosis Date    Anemia     BPH (benign prostatic hyperplasia)     Delayed emergence from general anesthesia     Hypertension     Inguinal hernia of left side without obstruction or gangrene     Prostate cancer (Multi)        Past Surgical History:   Procedure Laterality Date    HERNIA REPAIR      x2    PROSTATE SURGERY      biopsy       Patient Sexual activity questions deferred to the physician.    Family History   Problem Relation Name Age of Onset    Hypertension Mother      Cancer Father      Cancer Sister         No Known Allergies    Prior to Admission medications    Medication Sig Start Date End Date Taking? Authorizing Provider   amLODIPine (Norvasc) 10 mg tablet Take 1 tablet (10 mg) by mouth once daily.    Historical Provider, MD   losartan (Cozaar) 100 mg tablet Take 1 tablet (100 mg) by mouth once daily.    Historical Provider, MD   oxyCODONE-acetaminophen (Percocet) 5-325 mg tablet Take 1 tablet by mouth every 6 hours if needed for severe pain (7 - 10).  Patient not taking: Reported on 2024   Eleazar Noe MD   potassium chloride CR 10 mEq ER tablet Take 1 tablet (10 mEq) by mouth once daily.    Historical Provider, MD   terazosin (Hytrin) 1 mg capsule Take 1 capsule (1 mg) by mouth once daily at bedtime.    Historical Provider, MD        Constitutional: Negative for fever, chills, or sweats   ENMT: Negative for nasal discharge, congestion, ear pain, mouth pain, throat pain  Respiratory: Negative for cough, wheezing, shortness of breath   Cardiac: Negative  for chest pain, dyspnea on exertion, palpitations   Gastrointestinal: Negative for nausea, vomiting, diarrhea, constipation, abdominal pain  Genitourinary: Negative for dysuria, flank pain, frequency, hematuria. See HPI.   Musculoskeletal: Negative for decreased ROM, pain, swelling, weakness. Positive for generalized weakness/arthritic pain. Positive for chronic ankle/lower leg swelling. Neurological: Negative for dizziness, confusion, headache  Psychiatric: Negative for mood changes   Skin: Negative for itching, rash, ulcer    Hematologic/Lymph: Negative for bruising, easy bleeding  Allergic/Immunologic: Negative itching, sneezing, swelling      Physical Exam  Vitals reviewed.   Constitutional:       Appearance: Normal appearance.   HENT:      Head: Normocephalic.      Mouth/Throat:      Mouth: Mucous membranes are moist.      Pharynx: Oropharynx is clear.   Eyes:      Pupils: Pupils are equal, round, and reactive to light.   Cardiovascular:      Rate and Rhythm: Normal rate and regular rhythm.      Heart sounds: Normal heart sounds.   Pulmonary:      Effort: Pulmonary effort is normal.      Breath sounds: Normal breath sounds.   Abdominal:      General: Bowel sounds are normal.      Palpations: Abdomen is soft.   Musculoskeletal:         General: Normal range of motion.      Cervical back: Normal range of motion.      Right lower leg: Edema (Trace) present.      Left lower leg: Edema (Trace) present.      Comments: Generalized weakness. Uses cane.    Skin:     General: Skin is warm and dry.   Neurological:      General: No focal deficit present.      Mental Status: He is alert and oriented to person, place, and time.   Psychiatric:         Mood and Affect: Mood normal.         Behavior: Behavior normal.         PAT AIRWAY:   Airway:     Mallampati::  II    Neck ROM::  Full  normal        Visit Vitals  BP (!) 156/93   Pulse 77   Temp 36.6 °C (97.9 °F) (Temporal)   Resp 16       DASI Risk Score      Flowsheet Row  Most Recent Value   DASI SCORE 18.95   METS Score (Will be calculated only when all the questions are answered) 5.1          Caprini DVT Assessment      Flowsheet Row Most Recent Value   DVT Score 11   Current Status Major surgery planned, lasting over 3 hours, Present cancer or chemotherapy   History Prior major surgery   Age 60-75 years   BMI 30 or less          Modified Frailty Index      Flowsheet Row Most Recent Value   Modified Frailty Index Calculator .0909          CHADS2 Stroke Risk  Current as of 13 minutes ago        N/A 3 to 100%: High Risk   2 to < 3%: Medium Risk   0 to < 2%: Low Risk     Last Change: N/A          This score determines the patient's risk of having a stroke if the patient has atrial fibrillation.        This score is not applicable to this patient. Components are not calculated.          Revised Cardiac Risk Index      Flowsheet Row Most Recent Value   Revised Cardiac Risk Calculator 0          Apfel Simplified Score      Flowsheet Row Most Recent Value   Apfel Simplified Score Calculator 2          Risk Analysis Index Results This Encounter         9/9/2024  0741             BRIGHT Cancer History: Patient indicates history of cancer    Total Risk Analysis Index Score Without Cancer: 24    Total Risk Analysis Index Score: 38          Stop Bang Score      Flowsheet Row Most Recent Value   Do you snore loudly? 0   Do you often feel tired or fatigued after your sleep? 0   Has anyone ever observed you stop breathing in your sleep? 0   Do you have or are you being treated for high blood pressure? 1   Recent BMI (Calculated) 23.3   Is BMI greater than 35 kg/m2? 0=No   Age older than 50 years old? 1=Yes   Is your neck circumference greater than 17 inches (Male) or 16 inches (Female)? 0   Gender - Male 1=Yes   STOP-BANG Total Score 3            Assessment and Plan:     Assessment and Plan:     Preop:   OR with Dr. Irby on 9/27 for a resection robot-assisted prostate  Labs ordered per   [facility]  EKG on file from 5/10/24. Routed to Dr. Irby for his review     Cardiac:  Hypertension: Elevated at times. Working with his PCP to gain better control. Does not check his BP at home. On losartan and amlodipine.   Duke Activity Status Index (DASI)  DASI Score: 18.95   MET Score: 5.1  RCI 0, 3.9% risk for postoperative MACE    /Renal:   BPH/Prostate CA: Reason for upcoming surgery. Positive for urinary urgency, frequency, and incontinence.    Hematologic:   No hematological medical history.   Caprini score 11, patient at HIGH risk for perioperative DVT. Patient provided with VTE education/handout.     Skin check: Patient was instructed to make surgeon aware of any skin changes/concerns prior to surgery.     Anesthesia: Has had slow emergence from anesthesia previously.   No changes in medical history since last surgery/anesthesia-ASA III.     *See risk scores as previously documented

## 2024-09-09 NOTE — PREPROCEDURE INSTRUCTIONS
Thank you for visiting [facility] at [facility]. If you have any changes to your health condition, please call the SURGEON's office to alert them and give them details of your symptoms.        Preoperative Brain Exercises    What are brain exercises?  A brain exercise is any activity that engages your thinking (cognitive) skills.    What types of activities are considered brain exercises?  Jigsaw puzzles, crossword puzzles, word jumble, memory games, word search, and many more.  Many can be found free online or on your phone via a mobile bao.    Why should I do brain exercises before my surgery?  More recent research has shown brain exercise before surgery can lower the risk of postoperative delirium (confusion) which can be especially important for older adults.  Patients who did brain exercises for 5 to 10 hours the days before surgery, cut their risk of postoperative delirium in half up to 1 week after surgery.      Preoperative Deep Breathing Exercises    Why it is important to do deep breathing exercises before my surgery?  Deep breathing exercises strengthen your breathing muscles.  This helps you to recover after your surgery and decreases the chance of breathing complications.    How are the deep breathing exercises done?  Sit straight with your back supported.  Breathe in deeply and slowly through your nose. Your lower rib cage should expand and your abdomen may move forward.  Hold that breath for 3 to 5 seconds.  Breathe out through pursed lips, slowly and completely.  Rest and repeat 10 times every hour while awake.  Rest longer if you become dizzy or lightheaded.      Patient and Family Education   Ways You Can Help Prevent Blood Clots     This handout explains some simple things you can do to help prevent blood clots.      Blood clots are blockages that can form in the body's veins. When a blood clot forms in your deep veins, it may be called a deep vein thrombosis, or DVT for short. Blood clots can  happen in any part of the body where blood flows, but they are most common in the arms and legs. If a piece of a blood clot breaks free and travels to the lungs, it is called a pulmonary embolus (PE). A PE can be a very serious problem.      Being in the hospital or having surgery can raise your chances of getting a blood clot because you may not be well enough to move around as much as you normally do.      Ways you can help prevent blood clots in the hospital         Wearing SCDs. SCDs stands for Sequential Compression Devices.   SCDs are special sleeves that wrap around your legs  They attach to a pump that fills them with air to gently squeeze your legs every few minutes.   This helps return the blood in your legs to your heart.   SCDs should only be taken off when walking or bathing.   SCDs may not be comfortable, but they can help save your life.               Wearing compression stockings - if your doctor orders them. These special snug fitting stockings gently squeeze your legs to help blood flow.       Walking. Walking helps move the blood in your legs.   If your doctor says it is ok, try walking the halls at least   5 times a day. Ask us to help you get up, so you don't fall.      Taking any blood thinning medicines your doctor orders.          ©[facility]; 3/23        Ways you can help prevent blood clots at home       Wearing compression stockings - if your doctor orders them. ? Walking - to help move the blood in your legs.       Taking any blood thinning medicines your doctor orders.      Signs of a blood clot or PE      Tell your doctor or nurse know right away if you have of the problems listed below.    If you are at home, seek medical care right away. Call 911 for chest pain or problems breathing.          Signs of a blood clot (DVT) - such as pain,  swelling, redness or warmth in your arm or leg      Signs of a pulmonary embolism (PE) - such as chest     pain or feeling short of breath

## 2024-09-09 NOTE — H&P (VIEW-ONLY)
CPM/PAT Evaluation       Name: Emanuel Lopez)  /Age: 1955/68 y.o.     In-Person       Chief Complaint: Prostate cancer     HPI  Pleasant 67 y/o male presents with prostate cancer. He was referred for a prostate biopsy after reporting urinary frequency, urgency and incontinence. Denies dysuria or hematuria. Denies other associated symptoms. Denies recent fever/illness/chills.     Past Medical History:   Diagnosis Date    Anemia     BPH (benign prostatic hyperplasia)     Delayed emergence from general anesthesia     Hypertension     Inguinal hernia of left side without obstruction or gangrene     Prostate cancer (Multi)        Past Surgical History:   Procedure Laterality Date    HERNIA REPAIR      x2    PROSTATE SURGERY      biopsy       Patient Sexual activity questions deferred to the physician.    Family History   Problem Relation Name Age of Onset    Hypertension Mother      Cancer Father      Cancer Sister         No Known Allergies    Prior to Admission medications    Medication Sig Start Date End Date Taking? Authorizing Provider   amLODIPine (Norvasc) 10 mg tablet Take 1 tablet (10 mg) by mouth once daily.    Historical Provider, MD   losartan (Cozaar) 100 mg tablet Take 1 tablet (100 mg) by mouth once daily.    Historical Provider, MD   oxyCODONE-acetaminophen (Percocet) 5-325 mg tablet Take 1 tablet by mouth every 6 hours if needed for severe pain (7 - 10).  Patient not taking: Reported on 2024   Eleazar Noe MD   potassium chloride CR 10 mEq ER tablet Take 1 tablet (10 mEq) by mouth once daily.    Historical Provider, MD   terazosin (Hytrin) 1 mg capsule Take 1 capsule (1 mg) by mouth once daily at bedtime.    Historical Provider, MD        Constitutional: Negative for fever, chills, or sweats   ENMT: Negative for nasal discharge, congestion, ear pain, mouth pain, throat pain  Respiratory: Negative for cough, wheezing, shortness of breath   Cardiac: Negative  for chest pain, dyspnea on exertion, palpitations   Gastrointestinal: Negative for nausea, vomiting, diarrhea, constipation, abdominal pain  Genitourinary: Negative for dysuria, flank pain, frequency, hematuria. See HPI.   Musculoskeletal: Negative for decreased ROM, pain, swelling, weakness. Positive for generalized weakness/arthritic pain. Positive for chronic ankle/lower leg swelling. Neurological: Negative for dizziness, confusion, headache  Psychiatric: Negative for mood changes   Skin: Negative for itching, rash, ulcer    Hematologic/Lymph: Negative for bruising, easy bleeding  Allergic/Immunologic: Negative itching, sneezing, swelling      Physical Exam  Vitals reviewed.   Constitutional:       Appearance: Normal appearance.   HENT:      Head: Normocephalic.      Mouth/Throat:      Mouth: Mucous membranes are moist.      Pharynx: Oropharynx is clear.   Eyes:      Pupils: Pupils are equal, round, and reactive to light.   Cardiovascular:      Rate and Rhythm: Normal rate and regular rhythm.      Heart sounds: Normal heart sounds.   Pulmonary:      Effort: Pulmonary effort is normal.      Breath sounds: Normal breath sounds.   Abdominal:      General: Bowel sounds are normal.      Palpations: Abdomen is soft.   Musculoskeletal:         General: Normal range of motion.      Cervical back: Normal range of motion.      Right lower leg: Edema (Trace) present.      Left lower leg: Edema (Trace) present.      Comments: Generalized weakness. Uses cane.    Skin:     General: Skin is warm and dry.   Neurological:      General: No focal deficit present.      Mental Status: He is alert and oriented to person, place, and time.   Psychiatric:         Mood and Affect: Mood normal.         Behavior: Behavior normal.         PAT AIRWAY:   Airway:     Mallampati::  II    Neck ROM::  Full  normal        Visit Vitals  BP (!) 156/93   Pulse 77   Temp 36.6 °C (97.9 °F) (Temporal)   Resp 16       DASI Risk Score      Flowsheet Row  Most Recent Value   DASI SCORE 18.95   METS Score (Will be calculated only when all the questions are answered) 5.1          Caprini DVT Assessment      Flowsheet Row Most Recent Value   DVT Score 11   Current Status Major surgery planned, lasting over 3 hours, Present cancer or chemotherapy   History Prior major surgery   Age 60-75 years   BMI 30 or less          Modified Frailty Index      Flowsheet Row Most Recent Value   Modified Frailty Index Calculator .0909          CHADS2 Stroke Risk  Current as of 13 minutes ago        N/A 3 to 100%: High Risk   2 to < 3%: Medium Risk   0 to < 2%: Low Risk     Last Change: N/A          This score determines the patient's risk of having a stroke if the patient has atrial fibrillation.        This score is not applicable to this patient. Components are not calculated.          Revised Cardiac Risk Index      Flowsheet Row Most Recent Value   Revised Cardiac Risk Calculator 0          Apfel Simplified Score      Flowsheet Row Most Recent Value   Apfel Simplified Score Calculator 2          Risk Analysis Index Results This Encounter         9/9/2024  0741             BRIGHT Cancer History: Patient indicates history of cancer    Total Risk Analysis Index Score Without Cancer: 24    Total Risk Analysis Index Score: 38          Stop Bang Score      Flowsheet Row Most Recent Value   Do you snore loudly? 0   Do you often feel tired or fatigued after your sleep? 0   Has anyone ever observed you stop breathing in your sleep? 0   Do you have or are you being treated for high blood pressure? 1   Recent BMI (Calculated) 23.3   Is BMI greater than 35 kg/m2? 0=No   Age older than 50 years old? 1=Yes   Is your neck circumference greater than 17 inches (Male) or 16 inches (Female)? 0   Gender - Male 1=Yes   STOP-BANG Total Score 3            Assessment and Plan:     Assessment and Plan:     Preop:   OR with Dr. Irby on 9/27 for a resection robot-assisted prostate  Labs ordered per   Surekha  EKG on file from 5/10/24. Routed to Dr. Irby for his review     Cardiac:  Hypertension: Elevated at times. Working with his PCP to gain better control. Does not check his BP at home. On losartan and amlodipine.   Duke Activity Status Index (DASI)  DASI Score: 18.95   MET Score: 5.1  RCI 0, 3.9% risk for postoperative MACE    /Renal:   BPH/Prostate CA: Reason for upcoming surgery. Positive for urinary urgency, frequency, and incontinence.    Hematologic:   No hematological medical history.   Caprini score 11, patient at HIGH risk for perioperative DVT. Patient provided with VTE education/handout.     Skin check: Patient was instructed to make surgeon aware of any skin changes/concerns prior to surgery.     Anesthesia: Has had slow emergence from anesthesia previously.   No changes in medical history since last surgery/anesthesia-ASA III.     *See risk scores as previously documented

## 2024-09-10 LAB — BACTERIA UR CULT: NO GROWTH

## 2024-09-25 NOTE — DISCHARGE INSTRUCTIONS
[facility]rology [place]    DISCHARGE INSTRUCTIONS     Prostatectomy    Emanuel Lopez      Call [phone] during regular daytime business hours (8:00 am - 5:00 pm) and after 5:00 pm and ask for the Urology resident with any questions or concerns.    If it is a life-threatening situation, proceed to the nearest emergency department.        Follow-up appointment:  10/7/24 at 9 AM (catheter removal); 10/22/24 (post operative visit)      Thank you for the opportunity to care for you today.  Your health and healing are very important to us.  We hope we made you feel as comfortable as possible and are committed to your recovery and continued well-being.      The following is a brief overview of your prostatectomy procedure. Some of the information contained on this summary may be confidential.  This information should be kept in your records and should be shared with your regular doctor.    Physicians:   Dr. Irby    Procedure performed: removal of your prostate      What to Expect During your Recovery and Home Care  Anesthesia Side Effects   You may feel sleepy, tired, or have a sore throat.   You may also feel drowsiness, dizziness, or inability to think clearly.  For your safety, do not drive, drink alcoholic beverages, take any unprescribed medication or make any important decisions for 24 hours after anesthesia.  A responsible adult should be with you for 24 hours.        Activity and Recovery    No heavy lifting greater than 10 pounds for the next 4-6 weeks. No driving until mobility has returned to normal. Do not drive or operate heavy machinery while taking narcotic pain medications as these medications can alter perception, impair judgement, and slow reaction times.    Pain Control  Unfortunately, you may experience pain after your procedure. Adequate pain management can include alternative measures to help ease your pain and that can include over the counter Tylenol/ibuprofen or oxycodone which can be taken  as prescribed as needed for breakthrough pain. Do not take more than 4,000mg of Tylenol in a 24-hour period.      Your procedure was done robotically so you may experience gas pains from the surgery. Getting up and moving around is the best way to relieve those pains. You can also take some over the counter gas-x(simethicone).    Oxycodone is a narcotic medication that can cause constipation. Please take stool softeners while taking oxycodone.    Nausea/Vomiting   Clear liquids are best tolerated at first. Start slow, advance your diet as tolerated to normal foods. Avoid spicy, greasy, heavy foods at first. Also, you may feel nauseous or like you need to vomit if you take any type of medication on an empty stomach.  Call your physician if you are unable to eat or drink, are not passing gas and have persistent vomiting.    Signs of Bleeding   You could have some blood in your urine off and on over the next several weeks. Your urine will be light pink to yellow. Minor bleeding or drainage may occur from the surgical sites; however, excessive or consistent bleeding should be reported to your surgeon. Excessive bleeding is defined as blood that is dripping from wound, soaking you bandages, and is ketchup colored, thick with possible blood clots.  Consistent is defined as bleeding that does not stop.      Treatment/wound care:   Keep area(s) clean and dry.   It is okay to shower 24 hours after time of surgery.    Do not scrub wound(s), pat dry.    Do not submerge wound(s) in standing water until seen for follow up appointment (no tub bathing, swimming, or hot tubs).    Clean with mild soap, gentle washing, pat dry, cover with bandage as needed.    Avoid waterproof bandages.  No oils or lotions on incisions.  Staples/sutures removed in our [id number] days after the date of surgery.  Do not remove the staples/sutures on your own.    Please visually inspect your wound(s) at least once daily.  If the wound(s) are in a  difficult to see location, please use a mirror or have someone else assist with visual inspection.    Signs of Infection  Signs of infection can include fever, chills, redness around surgical incisions, green/yellow drainage from incisions, or severe abdominal pain.  If you see any of these occur, please contact your doctor's office at [phone].  Any fever higher than 100.4, especially if associated with an ill feeling, abdominal pain, chills, or nausea should be reported to your surgeon.      Assist in bowel movements/urination  Take daily stool softener you were prescribed  Increase fiber in diet  Increase water (6 to 8 glasses)  Increase walking   Can try adding over the counter MiraLAX or in extreme cases milk of magnesia.  If you have tried these methods and you are not passing gas, your bladder still feels full and you cannot have a bowel movement, please go to your nearest Emergency room/contact your physician.    Additional Instructions:   Use a small pillow to put pressure on your belly. This can make you more comfortable when you cough, laugh or do other actions.     CATHETER CARE  Always keep the catheters tubing and drainage bag below the level of your bladder.  Avoid loops and kinks in the catheter tubing.  NOTIFY your physician if catheter falls out or catheter seems clogged and urine is not draining.   Do not wear the small leg bag to bed you should be provided with a larger overnight bag that you should wear to bed and can hang over the side of the bed.  We recommend wearing the large bag in the shower as well as this is easy to dry, and you do not get your leg straps wet from your leg bag.   Your catheter should be secured to your upper thigh, do not allow it to hang or dangle.

## 2024-09-26 ENCOUNTER — ANESTHESIA EVENT (OUTPATIENT)
Dept: OPERATING ROOM | Facility: HOSPITAL | Age: 69
End: 2024-09-26
Payer: MEDICARE

## 2024-09-27 ENCOUNTER — HOSPITAL ENCOUNTER (OUTPATIENT)
Facility: HOSPITAL | Age: 69
Discharge: HOME | End: 2024-09-29
Attending: UROLOGY | Admitting: UROLOGY
Payer: MEDICARE

## 2024-09-27 ENCOUNTER — ANESTHESIA (OUTPATIENT)
Dept: OPERATING ROOM | Facility: HOSPITAL | Age: 69
End: 2024-09-27
Payer: MEDICARE

## 2024-09-27 DIAGNOSIS — C61 PROSTATE CANCER (MULTI): Primary | ICD-10-CM

## 2024-09-27 PROBLEM — I10 PRIMARY HYPERTENSION: Status: ACTIVE | Noted: 2024-09-27

## 2024-09-27 PROBLEM — N40.1 BENIGN PROSTATIC HYPERPLASIA WITH LOWER URINARY TRACT SYMPTOMS: Status: ACTIVE | Noted: 2024-09-27

## 2024-09-27 PROBLEM — M19.90 OSTEOARTHRITIS: Status: ACTIVE | Noted: 2024-09-27

## 2024-09-27 LAB
ABO GROUP (TYPE) IN BLOOD: NORMAL
ABO GROUP (TYPE) IN BLOOD: NORMAL
ANTIBODY SCREEN: NORMAL
RH FACTOR (ANTIGEN D): NORMAL
RH FACTOR (ANTIGEN D): NORMAL

## 2024-09-27 PROCEDURE — 88309 TISSUE EXAM BY PATHOLOGIST: CPT | Mod: TC | Performed by: UROLOGY

## 2024-09-27 PROCEDURE — 2500000004 HC RX 250 GENERAL PHARMACY W/ HCPCS (ALT 636 FOR OP/ED): Mod: JZ | Performed by: UROLOGY

## 2024-09-27 PROCEDURE — 2500000005 HC RX 250 GENERAL PHARMACY W/O HCPCS: Performed by: STUDENT IN AN ORGANIZED HEALTH CARE EDUCATION/TRAINING PROGRAM

## 2024-09-27 PROCEDURE — 96372 THER/PROPH/DIAG INJ SC/IM: CPT | Performed by: UROLOGY

## 2024-09-27 PROCEDURE — 2500000005 HC RX 250 GENERAL PHARMACY W/O HCPCS: Performed by: UROLOGY

## 2024-09-27 PROCEDURE — 7100000011 HC EXTENDED STAY RECOVERY HOURLY - NURSING UNIT

## 2024-09-27 PROCEDURE — 7100000002 HC RECOVERY ROOM TIME - EACH INCREMENTAL 1 MINUTE: Performed by: UROLOGY

## 2024-09-27 PROCEDURE — 2720000007 HC OR 272 NO HCPCS: Performed by: UROLOGY

## 2024-09-27 PROCEDURE — 36415 COLL VENOUS BLD VENIPUNCTURE: CPT | Performed by: STUDENT IN AN ORGANIZED HEALTH CARE EDUCATION/TRAINING PROGRAM

## 2024-09-27 PROCEDURE — C1760 CLOSURE DEV, VASC: HCPCS | Performed by: UROLOGY

## 2024-09-27 PROCEDURE — 7100000001 HC RECOVERY ROOM TIME - INITIAL BASE CHARGE: Performed by: UROLOGY

## 2024-09-27 PROCEDURE — 55866 LAPS SURG PRST8ECT RPBIC RAD: CPT | Performed by: UROLOGY

## 2024-09-27 PROCEDURE — 86900 BLOOD TYPING SEROLOGIC ABO: CPT | Performed by: STUDENT IN AN ORGANIZED HEALTH CARE EDUCATION/TRAINING PROGRAM

## 2024-09-27 PROCEDURE — 86901 BLOOD TYPING SEROLOGIC RH(D): CPT | Performed by: STUDENT IN AN ORGANIZED HEALTH CARE EDUCATION/TRAINING PROGRAM

## 2024-09-27 PROCEDURE — A55866 PR LAP,PROSTATECTOMY,RADICAL,W/NERVE SPARE,INCL ROBOTIC: Performed by: ANESTHESIOLOGIST ASSISTANT

## 2024-09-27 PROCEDURE — 3600000018 HC OR TIME - INITIAL BASE CHARGE - PROCEDURE LEVEL SIX: Performed by: UROLOGY

## 2024-09-27 PROCEDURE — 2500000001 HC RX 250 WO HCPCS SELF ADMINISTERED DRUGS (ALT 637 FOR MEDICARE OP): Performed by: STUDENT IN AN ORGANIZED HEALTH CARE EDUCATION/TRAINING PROGRAM

## 2024-09-27 PROCEDURE — 2780000003 HC OR 278 NO HCPCS: Performed by: UROLOGY

## 2024-09-27 PROCEDURE — 2500000004 HC RX 250 GENERAL PHARMACY W/ HCPCS (ALT 636 FOR OP/ED): Performed by: ANESTHESIOLOGIST ASSISTANT

## 2024-09-27 PROCEDURE — 88309 TISSUE EXAM BY PATHOLOGIST: CPT | Performed by: PATHOLOGY

## 2024-09-27 PROCEDURE — 2500000005 HC RX 250 GENERAL PHARMACY W/O HCPCS: Performed by: ANESTHESIOLOGIST ASSISTANT

## 2024-09-27 PROCEDURE — 2500000004 HC RX 250 GENERAL PHARMACY W/ HCPCS (ALT 636 FOR OP/ED): Performed by: STUDENT IN AN ORGANIZED HEALTH CARE EDUCATION/TRAINING PROGRAM

## 2024-09-27 PROCEDURE — 51990 LAPARO URETHRAL SUSPENSION: CPT | Performed by: UROLOGY

## 2024-09-27 PROCEDURE — 2500000004 HC RX 250 GENERAL PHARMACY W/ HCPCS (ALT 636 FOR OP/ED): Performed by: UROLOGY

## 2024-09-27 PROCEDURE — C1889 IMPLANT/INSERT DEVICE, NOC: HCPCS | Performed by: UROLOGY

## 2024-09-27 PROCEDURE — A55866 PR LAP,PROSTATECTOMY,RADICAL,W/NERVE SPARE,INCL ROBOTIC: Performed by: STUDENT IN AN ORGANIZED HEALTH CARE EDUCATION/TRAINING PROGRAM

## 2024-09-27 PROCEDURE — 36620 INSERTION CATHETER ARTERY: CPT | Performed by: STUDENT IN AN ORGANIZED HEALTH CARE EDUCATION/TRAINING PROGRAM

## 2024-09-27 PROCEDURE — 3700000001 HC GENERAL ANESTHESIA TIME - INITIAL BASE CHARGE: Performed by: UROLOGY

## 2024-09-27 PROCEDURE — 3700000002 HC GENERAL ANESTHESIA TIME - EACH INCREMENTAL 1 MINUTE: Performed by: UROLOGY

## 2024-09-27 PROCEDURE — 3600000017 HC OR TIME - EACH INCREMENTAL 1 MINUTE - PROCEDURE LEVEL SIX: Performed by: UROLOGY

## 2024-09-27 RX ORDER — OXYCODONE HYDROCHLORIDE 10 MG/1
10 TABLET ORAL EVERY 4 HOURS PRN
Status: DISCONTINUED | OUTPATIENT
Start: 2024-09-27 | End: 2024-09-29 | Stop reason: HOSPADM

## 2024-09-27 RX ORDER — SENNOSIDES 8.6 MG/1
2 TABLET ORAL 2 TIMES DAILY
Status: DISCONTINUED | OUTPATIENT
Start: 2024-09-27 | End: 2024-09-29 | Stop reason: HOSPADM

## 2024-09-27 RX ORDER — SODIUM CHLORIDE, SODIUM LACTATE, POTASSIUM CHLORIDE, CALCIUM CHLORIDE 600; 310; 30; 20 MG/100ML; MG/100ML; MG/100ML; MG/100ML
100 INJECTION, SOLUTION INTRAVENOUS CONTINUOUS
Status: DISCONTINUED | OUTPATIENT
Start: 2024-09-27 | End: 2024-09-27 | Stop reason: HOSPADM

## 2024-09-27 RX ORDER — POLYETHYLENE GLYCOL 3350 17 G/17G
17 POWDER, FOR SOLUTION ORAL DAILY
Status: DISCONTINUED | OUTPATIENT
Start: 2024-09-27 | End: 2024-09-29 | Stop reason: HOSPADM

## 2024-09-27 RX ORDER — LABETALOL HYDROCHLORIDE 5 MG/ML
5 INJECTION, SOLUTION INTRAVENOUS ONCE AS NEEDED
Status: DISCONTINUED | OUTPATIENT
Start: 2024-09-27 | End: 2024-09-27 | Stop reason: HOSPADM

## 2024-09-27 RX ORDER — ACETAMINOPHEN 325 MG/1
650 TABLET ORAL EVERY 4 HOURS PRN
Status: DISCONTINUED | OUTPATIENT
Start: 2024-09-27 | End: 2024-09-27 | Stop reason: HOSPADM

## 2024-09-27 RX ORDER — ONDANSETRON HYDROCHLORIDE 2 MG/ML
INJECTION, SOLUTION INTRAVENOUS AS NEEDED
Status: DISCONTINUED | OUTPATIENT
Start: 2024-09-27 | End: 2024-09-27

## 2024-09-27 RX ORDER — SODIUM CHLORIDE 0.9 G/100ML
IRRIGANT IRRIGATION AS NEEDED
Status: DISCONTINUED | OUTPATIENT
Start: 2024-09-27 | End: 2024-09-27 | Stop reason: HOSPADM

## 2024-09-27 RX ORDER — MIDAZOLAM HYDROCHLORIDE 1 MG/ML
INJECTION, SOLUTION INTRAMUSCULAR; INTRAVENOUS AS NEEDED
Status: DISCONTINUED | OUTPATIENT
Start: 2024-09-27 | End: 2024-09-27

## 2024-09-27 RX ORDER — TALC
3 POWDER (GRAM) TOPICAL NIGHTLY PRN
Status: DISCONTINUED | OUTPATIENT
Start: 2024-09-27 | End: 2024-09-29 | Stop reason: HOSPADM

## 2024-09-27 RX ORDER — HYDRALAZINE HYDROCHLORIDE 20 MG/ML
10 INJECTION INTRAMUSCULAR; INTRAVENOUS EVERY 6 HOURS PRN
Status: DISCONTINUED | OUTPATIENT
Start: 2024-09-27 | End: 2024-09-29 | Stop reason: HOSPADM

## 2024-09-27 RX ORDER — ONDANSETRON HYDROCHLORIDE 2 MG/ML
4 INJECTION, SOLUTION INTRAVENOUS EVERY 8 HOURS PRN
Status: DISCONTINUED | OUTPATIENT
Start: 2024-09-27 | End: 2024-09-29 | Stop reason: HOSPADM

## 2024-09-27 RX ORDER — HEPARIN SODIUM 5000 [USP'U]/ML
5000 INJECTION, SOLUTION INTRAVENOUS; SUBCUTANEOUS ONCE
Status: COMPLETED | OUTPATIENT
Start: 2024-09-27 | End: 2024-09-27

## 2024-09-27 RX ORDER — ONDANSETRON 4 MG/1
4 TABLET, FILM COATED ORAL EVERY 8 HOURS PRN
Status: DISCONTINUED | OUTPATIENT
Start: 2024-09-27 | End: 2024-09-29 | Stop reason: HOSPADM

## 2024-09-27 RX ORDER — LIDOCAINE HYDROCHLORIDE 10 MG/ML
0.1 INJECTION, SOLUTION INFILTRATION; PERINEURAL ONCE
OUTPATIENT
Start: 2024-09-27 | End: 2024-09-27

## 2024-09-27 RX ORDER — OXYCODONE HYDROCHLORIDE 5 MG/1
5 TABLET ORAL EVERY 4 HOURS PRN
Status: DISCONTINUED | OUTPATIENT
Start: 2024-09-27 | End: 2024-09-29 | Stop reason: HOSPADM

## 2024-09-27 RX ORDER — ROCURONIUM BROMIDE 50 MG/5 ML
SYRINGE (ML) INTRAVENOUS AS NEEDED
Status: DISCONTINUED | OUTPATIENT
Start: 2024-09-27 | End: 2024-09-27

## 2024-09-27 RX ORDER — BUPIVACAINE HYDROCHLORIDE 5 MG/ML
INJECTION, SOLUTION EPIDURAL; INTRACAUDAL AS NEEDED
Status: DISCONTINUED | OUTPATIENT
Start: 2024-09-27 | End: 2024-09-27 | Stop reason: HOSPADM

## 2024-09-27 RX ORDER — SODIUM CHLORIDE 9 MG/ML
100 INJECTION, SOLUTION INTRAVENOUS CONTINUOUS
Status: DISCONTINUED | OUTPATIENT
Start: 2024-09-27 | End: 2024-09-27

## 2024-09-27 RX ORDER — HEPARIN SODIUM 5000 [USP'U]/ML
5000 INJECTION, SOLUTION INTRAVENOUS; SUBCUTANEOUS EVERY 8 HOURS
Status: DISCONTINUED | OUTPATIENT
Start: 2024-09-27 | End: 2024-09-28

## 2024-09-27 RX ORDER — HYDRALAZINE HYDROCHLORIDE 20 MG/ML
INJECTION INTRAMUSCULAR; INTRAVENOUS AS NEEDED
Status: DISCONTINUED | OUTPATIENT
Start: 2024-09-27 | End: 2024-09-27

## 2024-09-27 RX ORDER — NALOXONE HYDROCHLORIDE 0.4 MG/ML
0.2 INJECTION, SOLUTION INTRAMUSCULAR; INTRAVENOUS; SUBCUTANEOUS EVERY 5 MIN PRN
Status: DISCONTINUED | OUTPATIENT
Start: 2024-09-27 | End: 2024-09-29 | Stop reason: HOSPADM

## 2024-09-27 RX ORDER — FENTANYL CITRATE 50 UG/ML
INJECTION, SOLUTION INTRAMUSCULAR; INTRAVENOUS AS NEEDED
Status: DISCONTINUED | OUTPATIENT
Start: 2024-09-27 | End: 2024-09-27

## 2024-09-27 RX ORDER — ACETAMINOPHEN 325 MG/1
650 TABLET ORAL EVERY 6 HOURS
Qty: 40 TABLET | Refills: 0 | Status: SHIPPED | OUTPATIENT
Start: 2024-09-27 | End: 2024-10-02

## 2024-09-27 RX ORDER — HYDROMORPHONE HYDROCHLORIDE 1 MG/ML
INJECTION, SOLUTION INTRAMUSCULAR; INTRAVENOUS; SUBCUTANEOUS AS NEEDED
Status: DISCONTINUED | OUTPATIENT
Start: 2024-09-27 | End: 2024-09-27

## 2024-09-27 RX ORDER — OXYCODONE HYDROCHLORIDE 5 MG/1
5 TABLET ORAL EVERY 6 HOURS PRN
Qty: 20 TABLET | Refills: 0 | Status: SHIPPED | OUTPATIENT
Start: 2024-09-27 | End: 2024-10-02

## 2024-09-27 RX ORDER — LABETALOL HYDROCHLORIDE 5 MG/ML
INJECTION, SOLUTION INTRAVENOUS AS NEEDED
Status: DISCONTINUED | OUTPATIENT
Start: 2024-09-27 | End: 2024-09-27

## 2024-09-27 RX ORDER — LIDOCAINE HYDROCHLORIDE 20 MG/ML
INJECTION, SOLUTION EPIDURAL; INFILTRATION; INTRACAUDAL; PERINEURAL AS NEEDED
Status: DISCONTINUED | OUTPATIENT
Start: 2024-09-27 | End: 2024-09-27

## 2024-09-27 RX ORDER — AMLODIPINE BESYLATE 10 MG/1
10 TABLET ORAL DAILY
Status: DISCONTINUED | OUTPATIENT
Start: 2024-09-27 | End: 2024-09-29 | Stop reason: HOSPADM

## 2024-09-27 RX ORDER — ACETAMINOPHEN 325 MG/1
650 TABLET ORAL EVERY 4 HOURS
Status: DISCONTINUED | OUTPATIENT
Start: 2024-09-27 | End: 2024-09-29 | Stop reason: HOSPADM

## 2024-09-27 RX ORDER — OXYCODONE HYDROCHLORIDE 5 MG/1
5 TABLET ORAL EVERY 4 HOURS PRN
Status: DISCONTINUED | OUTPATIENT
Start: 2024-09-27 | End: 2024-09-27 | Stop reason: HOSPADM

## 2024-09-27 RX ORDER — OXYCODONE HYDROCHLORIDE 10 MG/1
10 TABLET ORAL EVERY 4 HOURS PRN
Status: DISCONTINUED | OUTPATIENT
Start: 2024-09-27 | End: 2024-09-27 | Stop reason: HOSPADM

## 2024-09-27 RX ORDER — ONDANSETRON HYDROCHLORIDE 2 MG/ML
4 INJECTION, SOLUTION INTRAVENOUS ONCE AS NEEDED
Status: DISCONTINUED | OUTPATIENT
Start: 2024-09-27 | End: 2024-09-27 | Stop reason: HOSPADM

## 2024-09-27 RX ORDER — LIDOCAINE 560 MG/1
1 PATCH PERCUTANEOUS; TOPICAL; TRANSDERMAL DAILY
Status: DISCONTINUED | OUTPATIENT
Start: 2024-09-27 | End: 2024-09-29 | Stop reason: HOSPADM

## 2024-09-27 RX ORDER — PROPOFOL 10 MG/ML
INJECTION, EMULSION INTRAVENOUS AS NEEDED
Status: DISCONTINUED | OUTPATIENT
Start: 2024-09-27 | End: 2024-09-27

## 2024-09-27 RX ORDER — HYDROMORPHONE HYDROCHLORIDE 0.2 MG/ML
0.2 INJECTION INTRAMUSCULAR; INTRAVENOUS; SUBCUTANEOUS EVERY 5 MIN PRN
Status: DISCONTINUED | OUTPATIENT
Start: 2024-09-27 | End: 2024-09-27 | Stop reason: HOSPADM

## 2024-09-27 RX ORDER — CEFAZOLIN SODIUM 2 G/100ML
2 INJECTION, SOLUTION INTRAVENOUS ONCE
Status: COMPLETED | OUTPATIENT
Start: 2024-09-27 | End: 2024-09-27

## 2024-09-27 RX ORDER — ESMOLOL HYDROCHLORIDE 10 MG/ML
INJECTION INTRAVENOUS AS NEEDED
Status: DISCONTINUED | OUTPATIENT
Start: 2024-09-27 | End: 2024-09-27

## 2024-09-27 RX ORDER — POLYETHYLENE GLYCOL 3350 17 G/17G
17 POWDER, FOR SOLUTION ORAL DAILY
Qty: 30 PACKET | Refills: 0 | Status: SHIPPED | OUTPATIENT
Start: 2024-09-27 | End: 2024-10-27

## 2024-09-27 RX ORDER — SODIUM CHLORIDE, SODIUM LACTATE, POTASSIUM CHLORIDE, CALCIUM CHLORIDE 600; 310; 30; 20 MG/100ML; MG/100ML; MG/100ML; MG/100ML
100 INJECTION, SOLUTION INTRAVENOUS CONTINUOUS
Status: DISCONTINUED | OUTPATIENT
Start: 2024-09-27 | End: 2024-09-28

## 2024-09-27 RX ORDER — LABETALOL HYDROCHLORIDE 5 MG/ML
20 INJECTION, SOLUTION INTRAVENOUS EVERY 6 HOURS PRN
Status: DISCONTINUED | OUTPATIENT
Start: 2024-09-27 | End: 2024-09-29 | Stop reason: HOSPADM

## 2024-09-27 RX ORDER — HYDROMORPHONE HYDROCHLORIDE 0.2 MG/ML
0.2 INJECTION INTRAMUSCULAR; INTRAVENOUS; SUBCUTANEOUS EVERY 2 HOUR PRN
Status: DISCONTINUED | OUTPATIENT
Start: 2024-09-27 | End: 2024-09-29 | Stop reason: HOSPADM

## 2024-09-27 SDOH — SOCIAL STABILITY: SOCIAL INSECURITY: DO YOU FEEL ANYONE HAS EXPLOITED OR TAKEN ADVANTAGE OF YOU FINANCIALLY OR OF YOUR PERSONAL PROPERTY?: NO

## 2024-09-27 SDOH — ECONOMIC STABILITY: FOOD INSECURITY: WITHIN THE PAST 12 MONTHS, YOU WORRIED THAT YOUR FOOD WOULD RUN OUT BEFORE YOU GOT MONEY TO BUY MORE.: NEVER TRUE

## 2024-09-27 SDOH — SOCIAL STABILITY: SOCIAL INSECURITY
WITHIN THE LAST YEAR, HAVE YOU BEEN KICKED, HIT, SLAPPED, OR OTHERWISE PHYSICALLY HURT BY YOUR PARTNER OR EX-PARTNER?: NO

## 2024-09-27 SDOH — ECONOMIC STABILITY: INCOME INSECURITY: IN THE PAST 12 MONTHS, HAS THE ELECTRIC, GAS, OIL, OR WATER COMPANY THREATENED TO SHUT OFF SERVICE IN YOUR HOME?: NO

## 2024-09-27 SDOH — SOCIAL STABILITY: SOCIAL INSECURITY: DOES ANYONE TRY TO KEEP YOU FROM HAVING/CONTACTING OTHER FRIENDS OR DOING THINGS OUTSIDE YOUR HOME?: NO

## 2024-09-27 SDOH — SOCIAL STABILITY: SOCIAL INSECURITY: WITHIN THE LAST YEAR, HAVE YOU BEEN HUMILIATED OR EMOTIONALLY ABUSED IN OTHER WAYS BY YOUR PARTNER OR EX-PARTNER?: NO

## 2024-09-27 SDOH — SOCIAL STABILITY: SOCIAL INSECURITY: ABUSE: ADULT

## 2024-09-27 SDOH — HEALTH STABILITY: MENTAL HEALTH: CURRENT SMOKER: 0

## 2024-09-27 SDOH — SOCIAL STABILITY: SOCIAL INSECURITY: ARE THERE ANY APPARENT SIGNS OF INJURIES/BEHAVIORS THAT COULD BE RELATED TO ABUSE/NEGLECT?: NO

## 2024-09-27 SDOH — SOCIAL STABILITY: SOCIAL INSECURITY: HAS ANYONE EVER THREATENED TO HURT YOUR FAMILY OR YOUR PETS?: NO

## 2024-09-27 SDOH — HEALTH STABILITY: MENTAL HEALTH
STRESS IS WHEN SOMEONE FEELS TENSE, NERVOUS, ANXIOUS, OR CAN'T SLEEP AT NIGHT BECAUSE THEIR MIND IS TROUBLED. HOW STRESSED ARE YOU?: NOT AT ALL

## 2024-09-27 SDOH — SOCIAL STABILITY: SOCIAL INSECURITY
WITHIN THE LAST YEAR, HAVE TO BEEN RAPED OR FORCED TO HAVE ANY KIND OF SEXUAL ACTIVITY BY YOUR PARTNER OR EX-PARTNER?: NO

## 2024-09-27 SDOH — SOCIAL STABILITY: SOCIAL INSECURITY: ARE YOU OR HAVE YOU BEEN THREATENED OR ABUSED PHYSICALLY, EMOTIONALLY, OR SEXUALLY BY ANYONE?: NO

## 2024-09-27 SDOH — ECONOMIC STABILITY: FOOD INSECURITY: WITHIN THE PAST 12 MONTHS, THE FOOD YOU BOUGHT JUST DIDN'T LAST AND YOU DIDN'T HAVE MONEY TO GET MORE.: NEVER TRUE

## 2024-09-27 SDOH — SOCIAL STABILITY: SOCIAL INSECURITY: WERE YOU ABLE TO COMPLETE ALL THE BEHAVIORAL HEALTH SCREENINGS?: YES

## 2024-09-27 SDOH — SOCIAL STABILITY: SOCIAL INSECURITY: WITHIN THE LAST YEAR, HAVE YOU BEEN AFRAID OF YOUR PARTNER OR EX-PARTNER?: NO

## 2024-09-27 SDOH — SOCIAL STABILITY: SOCIAL INSECURITY: HAVE YOU HAD THOUGHTS OF HARMING ANYONE ELSE?: NO

## 2024-09-27 SDOH — SOCIAL STABILITY: SOCIAL INSECURITY: HAVE YOU HAD ANY THOUGHTS OF HARMING ANYONE ELSE?: NO

## 2024-09-27 SDOH — SOCIAL STABILITY: SOCIAL INSECURITY: DO YOU FEEL UNSAFE GOING BACK TO THE PLACE WHERE YOU ARE LIVING?: NO

## 2024-09-27 ASSESSMENT — COGNITIVE AND FUNCTIONAL STATUS - GENERAL
STANDING UP FROM CHAIR USING ARMS: A LOT
MOBILITY SCORE: 10
MOBILITY SCORE: 24
DAILY ACTIVITIY SCORE: 16
DRESSING REGULAR LOWER BODY CLOTHING: A LOT
DAILY ACTIVITIY SCORE: 24
TOILETING: A LITTLE
PATIENT BASELINE BEDBOUND: NO
HELP NEEDED FOR BATHING: A LOT
MOVING TO AND FROM BED TO CHAIR: A LOT
TURNING FROM BACK TO SIDE WHILE IN FLAT BAD: A LOT
PERSONAL GROOMING: A LITTLE
WALKING IN HOSPITAL ROOM: TOTAL
CLIMB 3 TO 5 STEPS WITH RAILING: TOTAL
DRESSING REGULAR UPPER BODY CLOTHING: A LOT
MOVING FROM LYING ON BACK TO SITTING ON SIDE OF FLAT BED WITH BEDRAILS: A LOT

## 2024-09-27 ASSESSMENT — ACTIVITIES OF DAILY LIVING (ADL)
PATIENT'S MEMORY ADEQUATE TO SAFELY COMPLETE DAILY ACTIVITIES?: YES
TOILETING: INDEPENDENT
LACK_OF_TRANSPORTATION: NO
GROOMING: INDEPENDENT
WALKS IN HOME: INDEPENDENT
JUDGMENT_ADEQUATE_SAFELY_COMPLETE_DAILY_ACTIVITIES: YES
ADEQUATE_TO_COMPLETE_ADL: YES
HEARING - RIGHT EAR: FUNCTIONAL
LACK_OF_TRANSPORTATION: NO
HEARING - LEFT EAR: FUNCTIONAL
BATHING: INDEPENDENT
DRESSING YOURSELF: INDEPENDENT
FEEDING YOURSELF: INDEPENDENT
ASSISTIVE_DEVICE: WALKER;CANE

## 2024-09-27 ASSESSMENT — PAIN SCALES - GENERAL
PAINLEVEL_OUTOF10: 2
PAINLEVEL_OUTOF10: 5 - MODERATE PAIN
PAINLEVEL_OUTOF10: 5 - MODERATE PAIN
PAINLEVEL_OUTOF10: 2
PAINLEVEL_OUTOF10: 3
PAINLEVEL_OUTOF10: 3
PAINLEVEL_OUTOF10: 2
PAINLEVEL_OUTOF10: 2
PAINLEVEL_OUTOF10: 0 - NO PAIN
PAINLEVEL_OUTOF10: 5 - MODERATE PAIN

## 2024-09-27 ASSESSMENT — COLUMBIA-SUICIDE SEVERITY RATING SCALE - C-SSRS
6. HAVE YOU EVER DONE ANYTHING, STARTED TO DO ANYTHING, OR PREPARED TO DO ANYTHING TO END YOUR LIFE?: NO
2. HAVE YOU ACTUALLY HAD ANY THOUGHTS OF KILLING YOURSELF?: NO
1. IN THE PAST MONTH, HAVE YOU WISHED YOU WERE DEAD OR WISHED YOU COULD GO TO SLEEP AND NOT WAKE UP?: NO

## 2024-09-27 ASSESSMENT — LIFESTYLE VARIABLES
SKIP TO QUESTIONS 9-10: 1
HOW OFTEN DO YOU HAVE A DRINK CONTAINING ALCOHOL: NEVER
HOW MANY STANDARD DRINKS CONTAINING ALCOHOL DO YOU HAVE ON A TYPICAL DAY: PATIENT DOES NOT DRINK
AUDIT-C TOTAL SCORE: 0
AUDIT-C TOTAL SCORE: 0
HOW OFTEN DO YOU HAVE 6 OR MORE DRINKS ON ONE OCCASION: NEVER

## 2024-09-27 ASSESSMENT — PATIENT HEALTH QUESTIONNAIRE - PHQ9
2. FEELING DOWN, DEPRESSED OR HOPELESS: NOT AT ALL
1. LITTLE INTEREST OR PLEASURE IN DOING THINGS: NOT AT ALL
SUM OF ALL RESPONSES TO PHQ9 QUESTIONS 1 & 2: 0

## 2024-09-27 ASSESSMENT — PAIN - FUNCTIONAL ASSESSMENT
PAIN_FUNCTIONAL_ASSESSMENT: 0-10

## 2024-09-27 ASSESSMENT — PAIN DESCRIPTION - DESCRIPTORS: DESCRIPTORS: ACHING

## 2024-09-27 ASSESSMENT — PAIN DESCRIPTION - LOCATION: LOCATION: ABDOMEN

## 2024-09-27 NOTE — ANESTHESIA PROCEDURE NOTES
Arterial Line:    Date/Time: 9/27/2024 11:27 AM    Staffing  Performed: attending   Authorized by: Cameron Mo DO    Performed by: ASHLIE Arreola    An arterial line was placed. Procedure performed using surface landmarks.in the OR for the following indication(s): continuous blood pressure monitoring and blood sampling needed.    A 20 gauge (size), 1 and 1/4 inch (length), Arrow (type) catheter was placed into the Left radial artery, secured by Tegadeab   Seldinger technique used.  Events:  patient tolerated procedure well with no complications.      Additional notes:  Wired passed with no resistance, modified seldinger technique on first attempt

## 2024-09-27 NOTE — CARE PLAN
The patient's goals for the shift include      The clinical goals for the shift include pain control      Problem: Skin  Goal: Decreased wound size/increased tissue granulation at next dressing change  Flowsheets (Taken 9/27/2024 1705)  Decreased wound size/increased tissue granulation at next dressing change:   Promote sleep for wound healing   Utilize specialty bed per algorithm

## 2024-09-27 NOTE — OP NOTE
Date: 2024  OR Location: [facility]    Name: Emanuel Lopez, : 1955, Age: 68 y.o., MRN: [id number], Sex: male    Diagnosis  Pre-op Diagnosis      * Prostate cancer (Multi) [C61] Post-op Diagnosis     * Prostate cancer (Multi) [C61]     Procedures  1.  Laparoscopic robotic assisted radical prostatectomy  2.  Laparoscopic robotic assisted anterior urethropexy    Surgeons      * Lyle Irby - Primary    Resident/Fellow/Other Assistant:  Surgeons and Role:     * Teri Shirley MD PhD - Resident - Assisting    Procedure Summary  Anesthesia: General  ASA: II  Anesthesia Staff:   Anesthesiologist: Cameron Mo DO  C-AA: ASHLIE Arreola  Estimated Blood Loss: 200 cc  Intra-op Medications:   Medication Name Total Dose   sodium chloride 0.9 % irrigation solution 3,000 mL   ceFAZolin in dextrose (iso-os) (Ancef) IVPB 2 g 2 g              Anesthesia Record               Intraprocedure I/O Totals       None           Specimen:   Order Name Source Comment Collection Info Order Time   TYPE AND SCREEN Blood, Venous  Collected By: EDWARD May 2024 10:09 AM     Release result to [facility]   Immediate        VERAB/VERIFY ABORH Blood, Venous Pre-op diagnosis:  Prostate cancer (Multi) [C61] Collected By: Lyle Irby MD 2024 12:32 PM     Release result to [facility]   Immediate        SURGICAL PATHOLOGY EXAM PROSTATE RADICAL PROSTATECTOMY Pre-op diagnosis:  Prostate cancer (Multi) [C61] Collected By: Lyle Irby MD 2024  2:42 PM       Staff:   Circulator: Ginna Fajardo RN  Relief Scrub: Saba Pollard  Scrub Person: Ty Presley         Drains and/or Catheters:   Closed/Suction Drain Lateral LLQ Bulb 15 Fr. (Active)       Urethral Catheter Latex 16 Fr. (Active)       Tourniquet Times:         Implants:     Findings: Evidence of prior bilateral hernia repairs; inflammation surrounding seminal vesicles    Indications: Emanuel Lopez is an 68 y.o. male who is having surgery for  Prostate cancer (Multi) [C61].  Patient presented with elevated PSA.  Biopsy revealed Quin 6 adenocarcinoma prostate.  He was counseled on elective treatment options and elected robotic prostatectomy.  The risk, benefits, and alternatives were discussed with the patient.  Patient is already incontinent of urine.  He understands this will be a continued risk postoperatively.  Informed consent was obtained    Procedure Details: Procedure: Patient was brought to the operating room and placed in the supine position. Gen. anesthesia was induced. Once he was adequately anesthetized, his legs were placed in the dorsal lithotomy position. His arms were placed to his side and appropriately padded. His lower abdomen and genitalia were shaved prepped and draped in the usual sterile fashion. A 16 Croatian Dalton was placed on the field and the bladder was aspirated dry. He was then placed in the extreme Trendelenburg position. A Veress needle was introduced just above the umbilicus. A saline drop test was used to confirm placement and then the abdomen was insufflated to 15 mmHg. An 8 mm robotic trocar was placed in the supraumbilical position and the laparoscope was inserted. The abdomen was inspected and no adhesions were noted.  There is evidence of recent bilateral hernia repairs.  At this point, we placed the assistant ports and robotic trochars in their standard position. The da Arlette robot was then docked. We incised the peritoneum deep in the pouch of Jose we attempted to develop the plane between the rectum and seminal vesicles.  There was scarring and adhesions noted.  I was unable to clearly identify the seminal vesicles.  Therefore the posterior dissection was aborted.     Attention was then turned anteriorly. The peritoneum was incised just lateral to the medial umbilical ligament bilaterally. We divided the urachus and developed the space of Retzius. The endopelvic fascia was sharply opened and we divided the  dorsal venous complex with the robotic stapler. Attention was then turned to the bladder neck. The anterior bladder neck was divided exposing the previously placed catheter. It was withdrawn into the prostate and the posterior bladder neck was then divided.  We then dissected the seminal vesicles and vas deferens free bilaterally.  There was a dense amount of adhesions around these structures making dissection somewhat difficult.  We eventually entered the plane posterior to the prostate.      Attention was then turned to the right side. The neurovascular bundle was freed from the lateral portion of the prostate. The prostatic pedicle was then controlled with hemoclips and divided. The neurovascular bundle was then fully freed all the way down to the apex. The left neurovascular bundle was then freed from the lateral portion of the prostate. Left pedicle was controlled with hemoclips and divided. The neurovascular bundle was fully freed all the way down to the apex. The anterior urethra was then divided exposing the previously placed catheter. The catheter was withdrawn and 2-0 Vicryl stitch was placed in the 6 o'clock position in the urethra. Urethra was then divided freeing the specimen. This placed in Endopouch retrieval bag and placed out of harm's way in the right upper quadrant. \\     We then inspected the prostatic fossa. No bleeding was noted. The previously placed stitch was used to anastomose the bladder neck to the urethra at the 6 o' clock position. Additional interrupted sutures were placed at the 5 and 7 o' clock position. Sutures were then placed at the 4 and 8 o'clock position using 3-0 V-lock. They were ran anteriorly to the 12 o'clock position to complete the anastomosis. It was tested and was watertight.  An additional 3-0 V-lock was used to pexy the anterior urethra/bladder neck to the pubic symphasis. A new 16 Urdu Dalton was placed and connected to gravity drainage. The left robotic arm was  then removed and a Elmer-Eric drain was introduced. It was secured to the skin with a suture. The remaining trochars were removed under direct vision. The robot was undocked. We then widened our fascial incision at the umbilicus and retrieved the specimen. The fascia was closed with interrupted 0 Vicryl sutures. The wounds were copiously irrigated, anesthetized with half percent Marcaine, and closed with 4-0 Vicryl and Dermabond. The patient was awakened and taken to the PACU in stable condition.     Complications:  None; patient tolerated the procedure well.    Disposition: PACU - hemodynamically stable.  Condition: stable     Lyle Irby  Phone Number: [phone]

## 2024-09-27 NOTE — ANESTHESIA PROCEDURE NOTES
Airway  Date/Time: 9/27/2024 11:27 AM  Urgency: elective    Airway not difficult    Staffing  Performed: ASHLIE and resident   Authorized by: Cameron Mo DO    Performed by: ASHLIE Arreola  Patient location during procedure: OR    Indications and Patient Condition  Indications for airway management: anesthesia  Spontaneous Ventilation: absent  Sedation level: deep  Preoxygenated: yes  Patient position: sniffing  MILS not maintained throughout  Mask difficulty assessment: 2 - vent by mask + OA or adjuvant +/- NMBA    Final Airway Details  Final airway type: endotracheal airway      Successful airway: ETT  Cuffed: yes   Successful intubation technique: direct laryngoscopy  Facilitating devices/methods: intubating stylet  Endotracheal tube insertion site: oral  Blade: Carrie  Blade size: #4  ETT size (mm): 7.5  Cormack-Lehane Classification: grade I - full view of glottis  Placement verified by: chest auscultation and capnometry   Measured from: gums  ETT to gums (cm): 25  Number of attempts at approach: 1    Additional Comments  Lips and teeth in pre-anesthetic condition

## 2024-09-27 NOTE — ANESTHESIA PROCEDURE NOTES
Peripheral IV  Date/Time: 9/27/2024 11:28 AM      Placement  Needle size: 16 G  Laterality: left  Location: hand  Site prep: alcohol  Technique: anatomical landmarks  Attempts: 1

## 2024-09-27 NOTE — ANESTHESIA PREPROCEDURE EVALUATION
Patient: Emanuel Lopez    Procedure Information       Anesthesia Start Date/Time: 09/27/24 1113    Procedure: Resection Robot-Assisted Prostate (Bilateral)    Location: [facility] OR 08 / [facility]    Surgeons: Lyle Irby MD            Relevant Problems   Anesthesia (within normal limits)      Cardiac   (+) Primary hypertension      Neuro (within normal limits)      /Renal   (+) Benign prostatic hyperplasia with lower urinary tract symptoms   (+) Prostate cancer (Multi)      Liver (within normal limits)      Endocrine (within normal limits)      Musculoskeletal   (+) Osteoarthritis      HEENT (within normal limits)       Clinical information reviewed:   Tobacco  Allergies  Meds   Med Hx  Surg Hx   Fam Hx  Soc Hx        NPO Detail:  NPO/Void Status  Date of Last Liquid: 09/27/24  Time of Last Liquid: 0700  Date of Last Solid: 09/26/24  Time of Last Solid: 2100  Time of Last Void: 0900         Physical Exam    Airway  Mallampati: II  TM distance: >3 FB  Neck ROM: limited     Cardiovascular   Rhythm: regular  Rate: normal     Dental   Comments: Poor dentition with multiple missing teeth. Halitosis present   Pulmonary   Breath sounds clear to auscultation     Abdominal   Abdomen: soft             Anesthesia Plan    History of general anesthesia?: yes  History of complications of general anesthesia?: no    ASA 2     general   (Arterial line)  The patient is not a current smoker.    intravenous induction   Postoperative administration of opioids is intended.  Trial extubation is planned.  Anesthetic plan and risks discussed with patient.  Use of blood products discussed with patient who consented to blood products.    Plan discussed with CAA.

## 2024-09-28 LAB
ANION GAP SERPL CALC-SCNC: 11 MMOL/L (ref 10–20)
BUN SERPL-MCNC: 21 MG/DL (ref 6–23)
CALCIUM SERPL-MCNC: 8.1 MG/DL (ref 8.6–10.3)
CHLORIDE SERPL-SCNC: 105 MMOL/L (ref 98–107)
CO2 SERPL-SCNC: 28 MMOL/L (ref 21–32)
CREAT SERPL-MCNC: 1.27 MG/DL (ref 0.5–1.3)
EGFRCR SERPLBLD CKD-EPI 2021: 61 ML/MIN/1.73M*2
ERYTHROCYTE [DISTWIDTH] IN BLOOD BY AUTOMATED COUNT: 12.9 % (ref 11.5–14.5)
ERYTHROCYTE [DISTWIDTH] IN BLOOD BY AUTOMATED COUNT: 13 % (ref 11.5–14.5)
GLUCOSE SERPL-MCNC: 109 MG/DL (ref 74–99)
HCT VFR BLD AUTO: 25.9 % (ref 41–52)
HCT VFR BLD AUTO: 26.8 % (ref 41–52)
HGB BLD-MCNC: 8.5 G/DL (ref 13.5–17.5)
HGB BLD-MCNC: 8.7 G/DL (ref 13.5–17.5)
HOLD SPECIMEN: NORMAL
MCH RBC QN AUTO: 31.5 PG (ref 26–34)
MCH RBC QN AUTO: 31.6 PG (ref 26–34)
MCHC RBC AUTO-ENTMCNC: 32.5 G/DL (ref 32–36)
MCHC RBC AUTO-ENTMCNC: 32.8 G/DL (ref 32–36)
MCV RBC AUTO: 96 FL (ref 80–100)
MCV RBC AUTO: 98 FL (ref 80–100)
NRBC BLD-RTO: 0 /100 WBCS (ref 0–0)
NRBC BLD-RTO: 0 /100 WBCS (ref 0–0)
PLATELET # BLD AUTO: 156 X10*3/UL (ref 150–450)
PLATELET # BLD AUTO: 158 X10*3/UL (ref 150–450)
POTASSIUM SERPL-SCNC: 4 MMOL/L (ref 3.5–5.3)
RBC # BLD AUTO: 2.7 X10*6/UL (ref 4.5–5.9)
RBC # BLD AUTO: 2.75 X10*6/UL (ref 4.5–5.9)
SODIUM SERPL-SCNC: 140 MMOL/L (ref 136–145)
WBC # BLD AUTO: 6.3 X10*3/UL (ref 4.4–11.3)
WBC # BLD AUTO: 6.7 X10*3/UL (ref 4.4–11.3)

## 2024-09-28 PROCEDURE — 36415 COLL VENOUS BLD VENIPUNCTURE: CPT | Performed by: UROLOGY

## 2024-09-28 PROCEDURE — 85027 COMPLETE CBC AUTOMATED: CPT | Performed by: STUDENT IN AN ORGANIZED HEALTH CARE EDUCATION/TRAINING PROGRAM

## 2024-09-28 PROCEDURE — 2500000005 HC RX 250 GENERAL PHARMACY W/O HCPCS: Performed by: STUDENT IN AN ORGANIZED HEALTH CARE EDUCATION/TRAINING PROGRAM

## 2024-09-28 PROCEDURE — 7100000011 HC EXTENDED STAY RECOVERY HOURLY - NURSING UNIT

## 2024-09-28 PROCEDURE — 36415 COLL VENOUS BLD VENIPUNCTURE: CPT | Performed by: STUDENT IN AN ORGANIZED HEALTH CARE EDUCATION/TRAINING PROGRAM

## 2024-09-28 PROCEDURE — 96372 THER/PROPH/DIAG INJ SC/IM: CPT | Performed by: STUDENT IN AN ORGANIZED HEALTH CARE EDUCATION/TRAINING PROGRAM

## 2024-09-28 PROCEDURE — 85027 COMPLETE CBC AUTOMATED: CPT | Performed by: UROLOGY

## 2024-09-28 PROCEDURE — 80048 BASIC METABOLIC PNL TOTAL CA: CPT | Performed by: STUDENT IN AN ORGANIZED HEALTH CARE EDUCATION/TRAINING PROGRAM

## 2024-09-28 PROCEDURE — 2500000004 HC RX 250 GENERAL PHARMACY W/ HCPCS (ALT 636 FOR OP/ED): Performed by: STUDENT IN AN ORGANIZED HEALTH CARE EDUCATION/TRAINING PROGRAM

## 2024-09-28 PROCEDURE — 2500000001 HC RX 250 WO HCPCS SELF ADMINISTERED DRUGS (ALT 637 FOR MEDICARE OP): Performed by: STUDENT IN AN ORGANIZED HEALTH CARE EDUCATION/TRAINING PROGRAM

## 2024-09-28 ASSESSMENT — COGNITIVE AND FUNCTIONAL STATUS - GENERAL
TURNING FROM BACK TO SIDE WHILE IN FLAT BAD: A LOT
CLIMB 3 TO 5 STEPS WITH RAILING: A LOT
DRESSING REGULAR UPPER BODY CLOTHING: A LOT
DRESSING REGULAR LOWER BODY CLOTHING: A LOT
MOVING TO AND FROM BED TO CHAIR: A LOT
MOVING FROM LYING ON BACK TO SITTING ON SIDE OF FLAT BED WITH BEDRAILS: A LOT
TURNING FROM BACK TO SIDE WHILE IN FLAT BAD: A LOT
MOBILITY SCORE: 12
STANDING UP FROM CHAIR USING ARMS: A LOT
STANDING UP FROM CHAIR USING ARMS: A LOT
WALKING IN HOSPITAL ROOM: A LOT
DAILY ACTIVITIY SCORE: 13
CLIMB 3 TO 5 STEPS WITH RAILING: TOTAL
DAILY ACTIVITIY SCORE: 16
DRESSING REGULAR LOWER BODY CLOTHING: A LOT
PERSONAL GROOMING: A LITTLE
TOILETING: A LITTLE
HELP NEEDED FOR BATHING: A LOT
DRESSING REGULAR UPPER BODY CLOTHING: A LOT
PERSONAL GROOMING: A LOT
MOVING TO AND FROM BED TO CHAIR: A LOT
MOBILITY SCORE: 12
EATING MEALS: A LITTLE
HELP NEEDED FOR BATHING: A LOT
TOILETING: A LOT
MOVING FROM LYING ON BACK TO SITTING ON SIDE OF FLAT BED WITH BEDRAILS: A LITTLE
WALKING IN HOSPITAL ROOM: A LOT

## 2024-09-28 ASSESSMENT — PAIN SCALES - GENERAL
PAINLEVEL_OUTOF10: 5 - MODERATE PAIN
PAINLEVEL_OUTOF10: 2
PAINLEVEL_OUTOF10: 3
PAINLEVEL_OUTOF10: 5 - MODERATE PAIN
PAINLEVEL_OUTOF10: 2
PAINLEVEL_OUTOF10: 5 - MODERATE PAIN
PAINLEVEL_OUTOF10: 5 - MODERATE PAIN

## 2024-09-28 ASSESSMENT — PAIN - FUNCTIONAL ASSESSMENT
PAIN_FUNCTIONAL_ASSESSMENT: 0-10

## 2024-09-28 ASSESSMENT — PAIN DESCRIPTION - LOCATION
LOCATION: ABDOMEN

## 2024-09-28 ASSESSMENT — PAIN DESCRIPTION - ORIENTATION
ORIENTATION: LEFT
ORIENTATION: LEFT

## 2024-09-28 NOTE — PROGRESS NOTES
Emanuel Lopez is a 68 y.o. male on day 0 of admission presenting with Prostate cancer (Multi).    Subjective   Patient reports some generalized abdominal pain but overall feels well.  Has only been up out of bed to chair.  Passing flatus.  Tolerating clears.  Denies any chest pain, shortness of breath, nausea, vomiting.       Objective     Last Recorded Vitals  Vitals:    09/28/24 0832   BP: 100/54   Pulse: 70   Resp: 17   Temp: 36 °C (96.8 °F)   SpO2: 96%       Intake/Output last 3 Shifts:  I/O last 3 completed shifts:  In: 3108.3 (38.1 mL/kg) [P.O.:150; I.V.:1258.3 (15.4 mL/kg); IV Piggyback:1700]  Out: 935 (11.5 mL/kg) [Urine:650 (0.2 mL/kg/hr); Drains:285]  Weight: 81.6 kg     Exam:  General: in NAD, appears stated age  Head: normocephalic, atraumatic  Respiratory: normal effort, no use of accessory muscles  Cardiovascular: no edema noted  Skin: normal turgor, no rashes  Neurologic: grossly intact, oriented to person/place/time  Psychiatric: mode and affect appropriate  Abdomen: Soft, incisions clean/dry/intact, mild tenderness to palpation throughout, nondistended  Drain with sanguinous output, scant.  Only drain x 1.  Drain stripped  Dalton catheter draining ample yellow urine output      Relevant Results    Lab Results   Component Value Date    WBC 6.3 09/28/2024    HGB 8.5 (L) 09/28/2024    HCT 25.9 (L) 09/28/2024    MCV 96 09/28/2024     09/28/2024     Lab Results   Component Value Date    GLUCOSE 109 (H) 09/28/2024    CALCIUM 8.1 (L) 09/28/2024     09/28/2024    K 4.0 09/28/2024    CO2 28 09/28/2024     09/28/2024    BUN 21 09/28/2024    CREATININE 1.27 09/28/2024                       This patient has a urinary catheter   Reason for the urinary catheter remaining today? perioperative use for selected surgical procedures               Assessment/Plan   Principal Problem:    Prostate cancer (Multi)  Active Problems:    Primary hypertension    Benign prostatic hyperplasia with lower  urinary tract symptoms    Osteoarthritis    Postop from prostatectomy  -Anemic, hemoglobin drop into 8  -Repeat hemoglobin now  -Ambulate, I-S  -Regular diet  -If hemoglobin remained stable, ambulatory, tolerates regular food okay for discharge today  -Heparin on hold      Alejo Valencia MD

## 2024-09-28 NOTE — CARE PLAN
The patient's goals for the shift include  Patient will have decrease level of pain    The clinical goals for the shift include patient will remain safe during the shift    Over the shift, the patient did not make progress toward the following goals. Barriers to progression include Patient still having pain. Recommendations to address these barriers include continue to monitor  Problem: Pain - Adult  Goal: Verbalizes/displays adequate comfort level or baseline comfort level  Outcome: Progressing     Problem: Safety - Adult  Goal: Free from fall injury  Outcome: Progressing     Problem: Skin  Goal: Decreased wound size/increased tissue granulation at next dressing change  Outcome: Progressing   .

## 2024-09-28 NOTE — CARE PLAN
The patient's goals for the shift include      The clinical goals for the shift include Adequate pain control    Patient has adequate pain control with tylenol. Pt tolerated regular diet dinner with no N/V. Encouraging patient to drink more fluids. No s/s of pain or distress; call light within reach.

## 2024-09-29 VITALS
TEMPERATURE: 98.2 F | HEIGHT: 73 IN | SYSTOLIC BLOOD PRESSURE: 131 MMHG | WEIGHT: 180 LBS | HEART RATE: 96 BPM | DIASTOLIC BLOOD PRESSURE: 86 MMHG | RESPIRATION RATE: 17 BRPM | BODY MASS INDEX: 23.86 KG/M2 | OXYGEN SATURATION: 95 %

## 2024-09-29 PROBLEM — N40.1 BENIGN PROSTATIC HYPERPLASIA WITH LOWER URINARY TRACT SYMPTOMS: Status: RESOLVED | Noted: 2024-09-27 | Resolved: 2024-09-29

## 2024-09-29 LAB
ANION GAP SERPL CALC-SCNC: 13 MMOL/L (ref 10–20)
BUN SERPL-MCNC: 16 MG/DL (ref 6–23)
CALCIUM SERPL-MCNC: 8.6 MG/DL (ref 8.6–10.3)
CHLORIDE SERPL-SCNC: 105 MMOL/L (ref 98–107)
CO2 SERPL-SCNC: 27 MMOL/L (ref 21–32)
CREAT SERPL-MCNC: 1.16 MG/DL (ref 0.5–1.3)
EGFRCR SERPLBLD CKD-EPI 2021: 68 ML/MIN/1.73M*2
ERYTHROCYTE [DISTWIDTH] IN BLOOD BY AUTOMATED COUNT: 12.8 % (ref 11.5–14.5)
GLUCOSE SERPL-MCNC: 99 MG/DL (ref 74–99)
HCT VFR BLD AUTO: 28.1 % (ref 41–52)
HGB BLD-MCNC: 9 G/DL (ref 13.5–17.5)
MCH RBC QN AUTO: 31 PG (ref 26–34)
MCHC RBC AUTO-ENTMCNC: 32 G/DL (ref 32–36)
MCV RBC AUTO: 97 FL (ref 80–100)
NRBC BLD-RTO: 0 /100 WBCS (ref 0–0)
PLATELET # BLD AUTO: 178 X10*3/UL (ref 150–450)
POTASSIUM SERPL-SCNC: 3.9 MMOL/L (ref 3.5–5.3)
RBC # BLD AUTO: 2.9 X10*6/UL (ref 4.5–5.9)
SODIUM SERPL-SCNC: 141 MMOL/L (ref 136–145)
WBC # BLD AUTO: 7.9 X10*3/UL (ref 4.4–11.3)

## 2024-09-29 PROCEDURE — 85027 COMPLETE CBC AUTOMATED: CPT

## 2024-09-29 PROCEDURE — 2500000001 HC RX 250 WO HCPCS SELF ADMINISTERED DRUGS (ALT 637 FOR MEDICARE OP): Performed by: STUDENT IN AN ORGANIZED HEALTH CARE EDUCATION/TRAINING PROGRAM

## 2024-09-29 PROCEDURE — 2500000005 HC RX 250 GENERAL PHARMACY W/O HCPCS: Performed by: STUDENT IN AN ORGANIZED HEALTH CARE EDUCATION/TRAINING PROGRAM

## 2024-09-29 PROCEDURE — 36415 COLL VENOUS BLD VENIPUNCTURE: CPT

## 2024-09-29 PROCEDURE — 80048 BASIC METABOLIC PNL TOTAL CA: CPT

## 2024-09-29 PROCEDURE — 7100000011 HC EXTENDED STAY RECOVERY HOURLY - NURSING UNIT

## 2024-09-29 ASSESSMENT — PAIN SCALES - GENERAL
PAINLEVEL_OUTOF10: 2
PAINLEVEL_OUTOF10: 4
PAINLEVEL_OUTOF10: 2

## 2024-09-29 ASSESSMENT — PAIN DESCRIPTION - LOCATION: LOCATION: ABDOMEN

## 2024-09-29 NOTE — CARE PLAN
Problem: Pain - Adult  Goal: Verbalizes/displays adequate comfort level or baseline comfort level  Outcome: Progressing     Problem: Safety - Adult  Goal: Free from fall injury  Outcome: Progressing     Problem: Discharge Planning  Goal: Discharge to home or other facility with appropriate resources  Outcome: Progressing     Problem: Chronic Conditions and Co-morbidities  Goal: Patient's chronic conditions and co-morbidity symptoms are monitored and maintained or improved  Outcome: Progressing     Problem: Skin  Goal: Decreased wound size/increased tissue granulation at next dressing change  Outcome: Progressing  Goal: Participates in plan/prevention/treatment measures  Outcome: Progressing  Goal: Prevent/manage excess moisture  Outcome: Progressing  Goal: Prevent/minimize sheer/friction injuries  Outcome: Progressing  Goal: Promote/optimize nutrition  Outcome: Progressing  Goal: Promote skin healing  Outcome: Progressing     Problem: Pain  Goal: Takes deep breaths with improved pain control throughout the shift  Outcome: Progressing  Goal: Turns in bed with improved pain control throughout the shift  Outcome: Progressing  Goal: Walks with improved pain control throughout the shift  Outcome: Progressing  Goal: Performs ADL's with improved pain control throughout shift  Outcome: Progressing  Goal: Participates in PT with improved pain control throughout the shift  Outcome: Progressing  Goal: Free from opioid side effects throughout the shift  Outcome: Progressing  Goal: Free from acute confusion related to pain meds throughout the shift  Outcome: Progressing     Problem: Meds/Post-op Pain  Goal: Pain controlled to tolerate pain level  Outcome: Progressing  Goal: Tolerates prescribed medication  Outcome: Progressing     Problem: DVT/VTE Prevention/Activity  Goal: No decrease in circulation/sensation  Outcome: Progressing  Goal: Prevent skin breakdown  Outcome: Progressing  Goal: Return to preop oxygenation  status  Outcome: Progressing  Goal: Tolerates optimal activity  Outcome: Progressing  Goal: Increase self care and/or family involvement in 24 hrs.  Outcome: Progressing     Problem: Wound care/infection prevention  Goal: No signs of infection in 24 hrs.  Outcome: Progressing  Goal: No unexpected bleeding from incision this shift  Outcome: Progressing     Problem: Diet/fluid balance  Goal: Adequate urinary output  Outcome: Progressing  Goal: Free from nausea/vomiting  Outcome: Progressing  Goal: Return in bowel function  Outcome: Progressing  Goal: Tolerates prescribed diet  Outcome: Progressing     Problem: Other goals  Goal: No change in neurological status  Outcome: Progressing  Goal: Stabilize vital signs (return to 10% of baseline)  Outcome: Progressing   The patient's goals for the shift include      The clinical goals for the shift include patient will remain hemodynamically stable    Over the shift, the patient did not make progress toward the following goals. Barriers to progression include . Recommendations to address these barriers include .

## 2024-09-29 NOTE — DISCHARGE SUMMARY
Discharge Diagnosis  Prostate cancer (Multi)    Issues Requiring Follow-Up  Catheter removal    Test Results Pending At Discharge  Pending Labs       Order Current Status    Surgical Pathology Exam In process            Hospital Course   Pt was admitted following radical prostatectomy. He progressed appropriately. On POD1 he had a hgb of 8.5 from 13.1 pre-op. A repeat hgb on POD1 and POD2 was stable and improving without concern for active bleeding. He ambulated, tolerated regular food, had good pain control, and had a reassuring exam/labs/vitals. In light of this he was deemed appropriate for discharge on POD1. Due to patient's inability to get a ride, he stayed until POD2.    Pertinent Physical Exam At Time of Discharge  Physical Exam    Home Medications     Medication List      START taking these medications     acetaminophen 325 mg tablet; Commonly known as: Tylenol; Take 2 tablets   (650 mg) by mouth every 6 hours for 5 days.   oxyCODONE 5 mg immediate release tablet; Commonly known as: Roxicodone;   Take 1 tablet (5 mg) by mouth every 6 hours if needed for severe pain (7 -   10) for up to 5 days.   polyethylene glycol 17 gram packet; Commonly known as: Glycolax,   Miralax; Take 17 g by mouth once daily.     CONTINUE taking these medications     amLODIPine 10 mg tablet; Commonly known as: Norvasc   losartan 100 mg tablet; Commonly known as: Cozaar   potassium chloride CR 10 mEq ER tablet; Commonly known as: Klor-Con   terazosin 1 mg capsule; Commonly known as: Hytrin     STOP taking these medications     oxyCODONE-acetaminophen 5-325 mg tablet; Commonly known as: Percocet       Outpatient Follow-Up  Future Appointments   Date Time Provider Department [place]   10/7/2024  9:00 AM UROLOGY [id number] NURSE [id number] [place]   10/22/2024  4:00 PM [id number]       Alejo Valencia MD

## 2024-09-30 ENCOUNTER — APPOINTMENT (OUTPATIENT)
Dept: UROLOGY | Facility: CLINIC | Age: 69
End: 2024-09-30
Payer: MEDICARE

## 2024-09-30 RX ORDER — POLYETHYLENE GLYCOL 3350 17 G/17G
17 POWDER, FOR SOLUTION ORAL DAILY
Qty: 510 G | Refills: 0 | Status: SHIPPED | OUTPATIENT
Start: 2024-09-30 | End: 2024-10-30

## 2024-09-30 ASSESSMENT — PAIN SCALES - GENERAL: PAIN_LEVEL: 1

## 2024-09-30 NOTE — ANESTHESIA POSTPROCEDURE EVALUATION
Patient: Emanuel Lopez    Procedure Summary       Date: 09/27/24 Room / Location: [facility] OR 08 / [facility]    Anesthesia Start: 1113 Anesthesia Stop: 1532    Procedure: Resection Robot-Assisted Prostate (Bilateral) Diagnosis:       Prostate cancer (Multi)      (Prostate cancer (Multi) [C61])    Surgeons: Lyle Irby MD Responsible Provider: Cameron Mo DO    Anesthesia Type: general ASA Status: 2            Anesthesia Type: general    Vitals Value Taken Time   /74 09/27/24 1630   Temp 36 °C (96.8 °F) 09/27/24 1630   Pulse 90 09/27/24 1637   Resp 9 09/27/24 1637   SpO2 97 % 09/27/24 1637   Vitals shown include unfiled device data.    Anesthesia Post Evaluation    Patient location during evaluation: PACU  Patient participation: complete - patient participated  Level of consciousness: awake and alert  Pain score: 1  Pain management: adequate  Multimodal analgesia pain management approach  Airway patency: patent  Cardiovascular status: acceptable  Respiratory status: acceptable  Hydration status: acceptable  Postoperative Nausea and Vomiting: none        No notable events documented.

## 2024-10-07 ENCOUNTER — APPOINTMENT (OUTPATIENT)
Dept: UROLOGY | Facility: CLINIC | Age: 69
End: 2024-10-07
Payer: MEDICARE

## 2024-10-07 VITALS — HEART RATE: 101 BPM | TEMPERATURE: 98.2 F | DIASTOLIC BLOOD PRESSURE: 82 MMHG | SYSTOLIC BLOOD PRESSURE: 135 MMHG

## 2024-10-07 DIAGNOSIS — C61 PROSTATE CANCER (MULTI): ICD-10-CM

## 2024-10-07 PROCEDURE — 99211 OFF/OP EST MAY X REQ PHY/QHP: CPT | Performed by: NURSE PRACTITIONER

## 2024-10-07 NOTE — PROGRESS NOTES
Pt presented for trial of void and catheter removal. Pt tolerated trial of void well. Pt tolerated catheter removal well. Pt output of marianela red blood after trial of void. Pt advised to increase water intake and return to office by 2 pm if marianela red blood does not lighten. Pt and daughter stated understanding. Advised pt and daughter of normal/abnormal signs and symptoms of urinary retention and when to seek provider or ER care. Pt and daughter stated understanding.

## 2024-10-08 ENCOUNTER — TELEPHONE (OUTPATIENT)
Dept: UROLOGY | Facility: CLINIC | Age: 69
End: 2024-10-08
Payer: MEDICARE

## 2024-10-08 NOTE — TELEPHONE ENCOUNTER
Daughter called and said was here yesterday for catheter removal, this morning woke up with bright red blood in depends, no clots, fever, she said he is drinking water, I asked how much and they really are not measuring.  Asked if you need to see or what else should they do?

## 2024-10-11 DIAGNOSIS — C61 PROSTATE CANCER (MULTI): Primary | ICD-10-CM

## 2024-10-11 LAB
LAB AP BLOCK FOR ADDITIONAL STUDIES: NORMAL
LABORATORY COMMENT REPORT: NORMAL
PATH REPORT.FINAL DX SPEC: NORMAL
PATH REPORT.GROSS SPEC: NORMAL
PATH REPORT.RELEVANT HX SPEC: NORMAL
PATH REPORT.TOTAL CANCER: NORMAL
PATHOLOGY SYNOPTIC REPORT: NORMAL

## 2024-10-15 DIAGNOSIS — I10 PRIMARY HYPERTENSION: ICD-10-CM

## 2024-10-15 RX ORDER — LOSARTAN POTASSIUM 100 MG/1
100 TABLET ORAL DAILY
Qty: 90 TABLET | Refills: 1 | Status: SHIPPED | OUTPATIENT
Start: 2024-10-15

## 2024-10-22 ENCOUNTER — LAB (OUTPATIENT)
Dept: LAB | Facility: LAB | Age: 69
End: 2024-10-22
Payer: MEDICARE

## 2024-10-22 ENCOUNTER — APPOINTMENT (OUTPATIENT)
Dept: UROLOGY | Facility: CLINIC | Age: 69
End: 2024-10-22
Payer: MEDICARE

## 2024-10-22 VITALS
SYSTOLIC BLOOD PRESSURE: 152 MMHG | TEMPERATURE: 98.7 F | HEART RATE: 97 BPM | BODY MASS INDEX: 22.4 KG/M2 | DIASTOLIC BLOOD PRESSURE: 87 MMHG | WEIGHT: 169.8 LBS

## 2024-10-22 DIAGNOSIS — C61 PROSTATE CANCER (MULTI): ICD-10-CM

## 2024-10-22 PROCEDURE — 3079F DIAST BP 80-89 MM HG: CPT | Performed by: UROLOGY

## 2024-10-22 PROCEDURE — 1159F MED LIST DOCD IN RCRD: CPT | Performed by: UROLOGY

## 2024-10-22 PROCEDURE — 36415 COLL VENOUS BLD VENIPUNCTURE: CPT

## 2024-10-22 PROCEDURE — 84153 ASSAY OF PSA TOTAL: CPT

## 2024-10-22 PROCEDURE — 3077F SYST BP >= 140 MM HG: CPT | Performed by: UROLOGY

## 2024-10-22 PROCEDURE — 99024 POSTOP FOLLOW-UP VISIT: CPT | Performed by: UROLOGY

## 2024-10-22 NOTE — PROGRESS NOTES
Subjective   Patient ID: Emanuel Lopez is a 68 y.o. male who presents for Prostate Cancer (S/p radical prostatectomy; No PSA done since procedure). Patient is s/p RALP and anterior urethropexy on 9/27/24.    HPI  The patient is accompanied by his daughter.  The patient states he is recovering well. Hematuria has resolved.     PSA Results  Component  Ref Range & Units 10 mo ago   PSA  0.00 - 4.00 ng/mL 21.77 High      Component  Ref Range & Units 1 yr ago   PSA  0.00 - 4.00 ng/mL 17.25 High      Pathology Results  FINAL DIAGNOSIS   A. PROSTATE RADICAL PROSTATECTOMY:   Prostatic adenocarcinoma, acinar type, Quin score 7 (3+4), grade group 2  - Positive for perineural invasion  - Resection margin, positive for adenocarcinoma, anterior apical (A1 & A4)  High-grade prostatic intraepithelial neoplasia present  See complete cancer staging summary     Review of Systems  A 12 system review was completed and is negative with the exception of those signs and symptoms noted in the history of present illness.    Objective   Physical Exam  General: in NAD, appears stated age  Head: normocephalic, atraumatic  Respiratory: normal effort, no use of accessory muscles  Cardiovascular: no edema noted  Skin: normal turgor, no rashes  Neurologic: grossly intact, oriented to person/place/time  Psychiatric: mode and affect appropriate     Assessment/Plan   Problem List Items Addressed This Visit             ICD-10-CM    Prostate cancer (Multi) C61    Relevant Orders    Follow Up In Urology    Prostate Specific Antigen    Prostate Specific Antigen     The patient will go to the lab for PSA now and again in 3 months. I explained pathology finds evidence of cancer in the margin. The patient can return to normal activity. We will see the patient back for follow-up in 3 months.     Follow-up in 3 months for continued management of prostate cancer.     Scribe Attestation  By signing my name below, IBushra Scribe   attest that  this documentation has been prepared under the direction and in the presence of Lyle Irby MD.

## 2024-10-23 LAB — PSA SERPL-MCNC: 0.26 NG/ML

## 2024-11-23 DIAGNOSIS — I10 PRIMARY HYPERTENSION: ICD-10-CM

## 2024-11-24 RX ORDER — POTASSIUM CHLORIDE 750 MG/1
10 TABLET, EXTENDED RELEASE ORAL DAILY
Qty: 30 TABLET | Refills: 5 | Status: SHIPPED | OUTPATIENT
Start: 2024-11-24

## 2025-01-21 ENCOUNTER — LAB (OUTPATIENT)
Dept: LAB | Facility: LAB | Age: 70
End: 2025-01-21
Payer: MEDICARE

## 2025-01-21 DIAGNOSIS — C61 PROSTATE CANCER (MULTI): ICD-10-CM

## 2025-01-21 LAB — PSA SERPL-MCNC: <0.1 NG/ML

## 2025-01-21 PROCEDURE — 84153 ASSAY OF PSA TOTAL: CPT

## 2025-01-23 ENCOUNTER — APPOINTMENT (OUTPATIENT)
Dept: UROLOGY | Facility: CLINIC | Age: 70
End: 2025-01-23
Payer: MEDICARE

## 2025-01-23 VITALS — SYSTOLIC BLOOD PRESSURE: 166 MMHG | HEART RATE: 85 BPM | TEMPERATURE: 97.3 F | DIASTOLIC BLOOD PRESSURE: 92 MMHG

## 2025-01-23 DIAGNOSIS — C61 PROSTATE CANCER (MULTI): ICD-10-CM

## 2025-01-23 PROCEDURE — G2211 COMPLEX E/M VISIT ADD ON: HCPCS | Performed by: UROLOGY

## 2025-01-23 PROCEDURE — 99213 OFFICE O/P EST LOW 20 MIN: CPT | Performed by: UROLOGY

## 2025-01-23 PROCEDURE — 3080F DIAST BP >= 90 MM HG: CPT | Performed by: UROLOGY

## 2025-01-23 PROCEDURE — 1159F MED LIST DOCD IN RCRD: CPT | Performed by: UROLOGY

## 2025-01-23 PROCEDURE — 3077F SYST BP >= 140 MM HG: CPT | Performed by: UROLOGY

## 2025-04-09 DIAGNOSIS — N40.1 BENIGN PROSTATIC HYPERPLASIA WITH URINARY FREQUENCY: ICD-10-CM

## 2025-04-09 DIAGNOSIS — R35.0 BENIGN PROSTATIC HYPERPLASIA WITH URINARY FREQUENCY: ICD-10-CM

## 2025-04-09 RX ORDER — TERAZOSIN 1 MG/1
1 CAPSULE ORAL NIGHTLY
Qty: 30 CAPSULE | Refills: 5 | OUTPATIENT
Start: 2025-04-09

## (undated) DEVICE — STAPLER, SKIN, PLUS, WIDE, 35

## (undated) DEVICE — Device

## (undated) DEVICE — SOLUTION, IRRIGATION, STERILE WATER, 1000 ML, POUR BOTTLE

## (undated) DEVICE — EVACUATOR, WOUND, SUCTION, CLOSED, JACKSON-PRATT, 100 CC, SILICONE

## (undated) DEVICE — PREP TRAY, VAGINAL

## (undated) DEVICE — SEALER, VESSEL, EXTENDED

## (undated) DEVICE — DRAPE, SHEET, ENDOSCOPY, GENERAL, FENESTRATED, ARMBOARD COVER, 98 X 123.5 IN, DISPOSABLE, LF, STERILE

## (undated) DEVICE — ASSEMBLY, STRYKER FLOW 2, SUCTION IRRIGATOR, WITH TIP

## (undated) DEVICE — SUTURE, PROLENE, 0, 30 IN, CT-2, BLUE

## (undated) DEVICE — DRESSING, GAUZE, SUPER KERLIX, 6X6

## (undated) DEVICE — SUTURE, VICRYL, 0, 27 IN, UR-6, VIOLET

## (undated) DEVICE — SUTURE, VICRYL 2-0, TAPER POINT, RB-1 VIOLET 27 INCH

## (undated) DEVICE — SOLUTION, IRRIGATION, SODIUM CHLORIDE 0.9%, 1000 ML, POUR BOTTLE

## (undated) DEVICE — PREP, SKIN, BACTOSHEILD, 4%, 4OZ

## (undated) DEVICE — DEVICE, OMNICLOSE, 10MM

## (undated) DEVICE — DRESSING, GAUZE, DRAIN SPONGE, 6 PLY, EXCILON, 4 X 4 IN, STERILE

## (undated) DEVICE — STAPLER, SUREFORM 45, DAVINCI XI

## (undated) DEVICE — SPONGE GZ W4XL4IN COT 12 PLY TYP VII WVN C FLD DSGN STERILE

## (undated) DEVICE — SCISSORS, METZ, CURVED, 3/4 BLADE

## (undated) DEVICE — DRAPE, COLUMN, DAVINCI XI

## (undated) DEVICE — DRAIN, PENROSE, 0.25 X 12 IN

## (undated) DEVICE — NEEDLE SPNL 22GA L7IN SPINOCAN

## (undated) DEVICE — TRAY, SURESTEP, SILICONE DRAINAGE BAG, STATLOCK, 16FR

## (undated) DEVICE — SUTURE, PROLENE, 2-0, 36 IN, SH, DA, BLUE

## (undated) DEVICE — OBTURATOR, BLADELESS , SU

## (undated) DEVICE — COVER, TIP HOT SHEARS ENDOWRIST

## (undated) DEVICE — SWABSTICK MEDICATED 10% POVIDONE IOD PVP SGL ANTISEP SAT

## (undated) DEVICE — TOWEL PACK, STERILE, 4/PACK, BLUE

## (undated) DEVICE — CLIP, LIGATING, HEM-O-LOCK, MLX, LARGE, LF, PURPLE

## (undated) DEVICE — GLOVE, SURGICAL, PROTEXIS PI MICRO, 7.5, PF, LF

## (undated) DEVICE — PAD N ADH W3XL4IN POLY COT SFT PERF FLM EASILY CUT ABSRB

## (undated) DEVICE — SUTURE, VICRYL PLUS, 4-0, 27 IN, PS-2

## (undated) DEVICE — SUTURE, VICRYL, 2-0, 18 IN, UNDYED

## (undated) DEVICE — SUTURE, VICRYL, 3-0, 27 IN, SH

## (undated) DEVICE — DRAPE, SHEET, UTILITY, NON ABSORBENT, 18 X 26 IN, LF

## (undated) DEVICE — NEEDLE, INSUFFLATION, 13GAX120MM, DISP

## (undated) DEVICE — CATHETER, FOLEY, ELASTOMER, 2 WAY, 16 FR 5 CC, SILICONE

## (undated) DEVICE — DRAPE, LEGGINGS, 48 X 31 IN, STERILE, LF

## (undated) DEVICE — CANNULA REDUCER, DAVINCI XI

## (undated) DEVICE — SPONGE, DISSECTOR, PEANUT, 3/8, STERILE 5 FOAM HOLDER"

## (undated) DEVICE — CHANNEL DRAIN, BARD, 15FR, ROUND HUBLESS, FULL FLUTED

## (undated) DEVICE — GLOVE, SURGICAL, PROTEXIS PI , 7.5, PF, LF

## (undated) DEVICE — RETRIEVAL SYSTEM, MONARCH, 10MM DISP ENDOSCOPIC

## (undated) DEVICE — RELOAD, SUREFORM 45, 4.3 GREEN

## (undated) DEVICE — CANNULA SEAL, STAPLER, DAVINCI XI

## (undated) DEVICE — DRAIN, JP CHANNEL, 15 FR, RND, W/TROCAR

## (undated) DEVICE — TROCAR, KII OPTICAL BLADELESS 5MM Z THREAD 100MM LNGTH

## (undated) DEVICE — SUTURE, SILK, 0, 30 IN, FSL, BLACK

## (undated) DEVICE — COVER PRB ULTRASOUND 20X3.5 CM BULK ENDOCAVITY NS LTX

## (undated) DEVICE — ADHESIVE, SKIN, DERMABOND ADVANCED, 15CM, PEN-STYLE

## (undated) DEVICE — DRAIN, WOUND, FLAT, HUBLESS, FULL LENGTH PERFORATION, 10 MM X 20 CM, SILICONE

## (undated) DEVICE — APPLICATOR, CHLORAPREP, W/ORANGE TINT, 26ML

## (undated) DEVICE — DRAPE, ARM XI

## (undated) DEVICE — JELLY,LUBE,STERILE,FLIP TOP,TUBE,2-OZ: Brand: MEDLINE

## (undated) DEVICE — TUBING, HIGH FLOW, INSUFFLATOR, W/FILTER

## (undated) DEVICE — LABEL MED MINI W/ MARKER

## (undated) DEVICE — KIT, APPLICATOR, DUPLOSPRAY, 30CM

## (undated) DEVICE — SYRINGE MED 10ML LUERLOCK TIP W/O SFTY DISP

## (undated) DEVICE — SUTURE, VICRYL, 4-0, 18 IN, UNDYED BR PS-2

## (undated) DEVICE — SEAL, UNIVERSAL, 5-12MM

## (undated) DEVICE — TISSEEL FIBRIN SEALANT, PRIMA, FROZEN, 10ML

## (undated) DEVICE — DEVICE BX 18 GAX21 CM EZ COR

## (undated) DEVICE — SUTURE, V-LOC 3-0 CV-23 6 GR 180 ABS"

## (undated) DEVICE — SYRINGE, 60 CC, IRRIGATION, PISTON, CATH TIP, W/LUER ADAPTER,DISP

## (undated) DEVICE — CARE KIT, LAPAROSCOPIC, ADVANCED

## (undated) DEVICE — SPONGE, HEMOSTATIC, CELLULOSE, SURGICEL, 2 X 14 IN

## (undated) DEVICE — TROCAR, OPTICAL, BLADELESS, 12MM, THREADED, 100MM LENGTH